# Patient Record
Sex: FEMALE | Race: WHITE | NOT HISPANIC OR LATINO | Employment: PART TIME | URBAN - METROPOLITAN AREA
[De-identification: names, ages, dates, MRNs, and addresses within clinical notes are randomized per-mention and may not be internally consistent; named-entity substitution may affect disease eponyms.]

---

## 2019-07-29 ENCOUNTER — RECORDS - HEALTHEAST (OUTPATIENT)
Dept: LAB | Facility: CLINIC | Age: 49
End: 2019-07-29

## 2019-07-29 LAB
ANION GAP SERPL CALCULATED.3IONS-SCNC: 6 MMOL/L (ref 5–18)
BUN SERPL-MCNC: 10 MG/DL (ref 8–22)
CALCIUM SERPL-MCNC: 9.4 MG/DL (ref 8.5–10.5)
CHLORIDE BLD-SCNC: 106 MMOL/L (ref 98–107)
CHOLEST SERPL-MCNC: 246 MG/DL
CO2 SERPL-SCNC: 25 MMOL/L (ref 22–31)
CREAT SERPL-MCNC: 0.71 MG/DL (ref 0.6–1.1)
FASTING STATUS PATIENT QL REPORTED: NO
GFR SERPL CREATININE-BSD FRML MDRD: >60 ML/MIN/1.73M2
GLUCOSE BLD-MCNC: 81 MG/DL (ref 70–125)
HDLC SERPL-MCNC: 42 MG/DL
LDLC SERPL CALC-MCNC: 170 MG/DL
POTASSIUM BLD-SCNC: 3.9 MMOL/L (ref 3.5–5)
SODIUM SERPL-SCNC: 137 MMOL/L (ref 136–145)
TRIGL SERPL-MCNC: 169 MG/DL
TSH SERPL DL<=0.005 MIU/L-ACNC: 0.77 UIU/ML (ref 0.3–5)

## 2019-08-02 ENCOUNTER — RECORDS - HEALTHEAST (OUTPATIENT)
Dept: LAB | Facility: CLINIC | Age: 49
End: 2019-08-02

## 2019-08-02 LAB
FERRITIN SERPL-MCNC: 5 NG/ML (ref 10–130)
IRON SATN MFR SERPL: 10 % (ref 20–50)
IRON SERPL-MCNC: 44 UG/DL (ref 42–175)
TIBC SERPL-MCNC: 444 UG/DL (ref 313–563)
TRANSFERRIN SERPL-MCNC: 355 MG/DL (ref 212–360)

## 2020-08-25 ENCOUNTER — RECORDS - HEALTHEAST (OUTPATIENT)
Dept: LAB | Facility: CLINIC | Age: 50
End: 2020-08-25

## 2020-08-25 LAB
CHOLEST SERPL-MCNC: 297 MG/DL
FASTING STATUS PATIENT QL REPORTED: ABNORMAL
FASTING STATUS PATIENT QL REPORTED: NORMAL
GLUCOSE BLD-MCNC: 72 MG/DL (ref 70–125)
HDLC SERPL-MCNC: 45 MG/DL
LDLC SERPL CALC-MCNC: 213 MG/DL
TRIGL SERPL-MCNC: 194 MG/DL

## 2020-08-26 LAB — FERRITIN SERPL-MCNC: 23 NG/ML (ref 10–130)

## 2021-05-26 ENCOUNTER — RECORDS - HEALTHEAST (OUTPATIENT)
Dept: ADMINISTRATIVE | Facility: CLINIC | Age: 51
End: 2021-05-26

## 2021-05-30 ENCOUNTER — RECORDS - HEALTHEAST (OUTPATIENT)
Dept: ADMINISTRATIVE | Facility: CLINIC | Age: 51
End: 2021-05-30

## 2021-06-01 ENCOUNTER — RECORDS - HEALTHEAST (OUTPATIENT)
Dept: ADMINISTRATIVE | Facility: CLINIC | Age: 51
End: 2021-06-01

## 2021-11-29 ENCOUNTER — TRANSFERRED RECORDS (OUTPATIENT)
Dept: HEALTH INFORMATION MANAGEMENT | Facility: CLINIC | Age: 51
End: 2021-11-29

## 2022-01-13 ENCOUNTER — LAB REQUISITION (OUTPATIENT)
Dept: LAB | Facility: CLINIC | Age: 52
End: 2022-01-13
Payer: COMMERCIAL

## 2022-01-13 DIAGNOSIS — Z03.818 ENCOUNTER FOR OBSERVATION FOR SUSPECTED EXPOSURE TO OTHER BIOLOGICAL AGENTS RULED OUT: ICD-10-CM

## 2022-01-13 PROCEDURE — U0005 INFEC AGEN DETEC AMPLI PROBE: HCPCS | Mod: ORL | Performed by: FAMILY MEDICINE

## 2022-01-14 LAB — SARS-COV-2 RNA RESP QL NAA+PROBE: NEGATIVE

## 2022-04-19 ENCOUNTER — TRANSFERRED RECORDS (OUTPATIENT)
Dept: HEALTH INFORMATION MANAGEMENT | Facility: CLINIC | Age: 52
End: 2022-04-19
Payer: COMMERCIAL

## 2022-04-20 ENCOUNTER — TRANSCRIBE ORDERS (OUTPATIENT)
Dept: RESPIRATORY THERAPY | Facility: CLINIC | Age: 52
End: 2022-04-20
Payer: COMMERCIAL

## 2022-04-20 DIAGNOSIS — J45.40 MODERATE PERSISTENT ASTHMA: Primary | ICD-10-CM

## 2022-04-21 ENCOUNTER — TRANSFERRED RECORDS (OUTPATIENT)
Dept: HEALTH INFORMATION MANAGEMENT | Facility: CLINIC | Age: 52
End: 2022-04-21
Payer: COMMERCIAL

## 2022-04-25 ENCOUNTER — LAB REQUISITION (OUTPATIENT)
Dept: LAB | Facility: CLINIC | Age: 52
End: 2022-04-25

## 2022-04-25 ENCOUNTER — MEDICAL CORRESPONDENCE (OUTPATIENT)
Dept: HEALTH INFORMATION MANAGEMENT | Facility: CLINIC | Age: 52
End: 2022-04-25
Payer: COMMERCIAL

## 2022-04-25 DIAGNOSIS — R91.8 OTHER NONSPECIFIC ABNORMAL FINDING OF LUNG FIELD: ICD-10-CM

## 2022-04-25 LAB
ALBUMIN SERPL-MCNC: 3.8 G/DL (ref 3.5–5)
ALP SERPL-CCNC: 83 U/L (ref 45–120)
ALT SERPL W P-5'-P-CCNC: 14 U/L (ref 0–45)
ANION GAP SERPL CALCULATED.3IONS-SCNC: 9 MMOL/L (ref 5–18)
AST SERPL W P-5'-P-CCNC: 15 U/L (ref 0–40)
BASOPHILS # BLD AUTO: 0.1 10E3/UL (ref 0–0.2)
BASOPHILS NFR BLD AUTO: 1 %
BILIRUB SERPL-MCNC: 0.4 MG/DL (ref 0–1)
BUN SERPL-MCNC: 14 MG/DL (ref 8–22)
C REACTIVE PROTEIN LHE: 0.5 MG/DL (ref 0–0.8)
CALCIUM SERPL-MCNC: 9.4 MG/DL (ref 8.5–10.5)
CHLORIDE BLD-SCNC: 105 MMOL/L (ref 98–107)
CO2 SERPL-SCNC: 26 MMOL/L (ref 22–31)
CREAT SERPL-MCNC: 0.79 MG/DL (ref 0.6–1.1)
EOSINOPHIL # BLD AUTO: 0.2 10E3/UL (ref 0–0.7)
EOSINOPHIL NFR BLD AUTO: 3 %
ERYTHROCYTE [DISTWIDTH] IN BLOOD BY AUTOMATED COUNT: 13.5 % (ref 10–15)
ERYTHROCYTE [SEDIMENTATION RATE] IN BLOOD BY WESTERGREN METHOD: 20 MM/HR (ref 0–20)
GFR SERPL CREATININE-BSD FRML MDRD: >90 ML/MIN/1.73M2
GLUCOSE BLD-MCNC: 93 MG/DL (ref 70–125)
HCT VFR BLD AUTO: 40.9 % (ref 35–47)
HGB BLD-MCNC: 13.2 G/DL (ref 11.7–15.7)
IMM GRANULOCYTES # BLD: 0 10E3/UL
IMM GRANULOCYTES NFR BLD: 0 %
LYMPHOCYTES # BLD AUTO: 1.7 10E3/UL (ref 0.8–5.3)
LYMPHOCYTES NFR BLD AUTO: 23 %
MCH RBC QN AUTO: 29.6 PG (ref 26.5–33)
MCHC RBC AUTO-ENTMCNC: 32.3 G/DL (ref 31.5–36.5)
MCV RBC AUTO: 92 FL (ref 78–100)
MONOCYTES # BLD AUTO: 0.5 10E3/UL (ref 0–1.3)
MONOCYTES NFR BLD AUTO: 7 %
NEUTROPHILS # BLD AUTO: 4.9 10E3/UL (ref 1.6–8.3)
NEUTROPHILS NFR BLD AUTO: 66 %
NRBC # BLD AUTO: 0 10E3/UL
NRBC BLD AUTO-RTO: 0 /100
PLATELET # BLD AUTO: 219 10E3/UL (ref 150–450)
POTASSIUM BLD-SCNC: 4.1 MMOL/L (ref 3.5–5)
PROT SERPL-MCNC: 7.1 G/DL (ref 6–8)
RBC # BLD AUTO: 4.46 10E6/UL (ref 3.8–5.2)
SODIUM SERPL-SCNC: 140 MMOL/L (ref 136–145)
WBC # BLD AUTO: 7.4 10E3/UL (ref 4–11)

## 2022-04-25 PROCEDURE — 86140 C-REACTIVE PROTEIN: CPT | Performed by: FAMILY MEDICINE

## 2022-04-25 PROCEDURE — 85025 COMPLETE CBC W/AUTO DIFF WBC: CPT | Performed by: FAMILY MEDICINE

## 2022-04-25 PROCEDURE — 85652 RBC SED RATE AUTOMATED: CPT | Performed by: FAMILY MEDICINE

## 2022-04-25 PROCEDURE — 80053 COMPREHEN METABOLIC PANEL: CPT | Performed by: FAMILY MEDICINE

## 2022-05-12 ENCOUNTER — HOSPITAL ENCOUNTER (OUTPATIENT)
Dept: RESPIRATORY THERAPY | Facility: CLINIC | Age: 52
Discharge: HOME | End: 2022-05-12
Admitting: FAMILY MEDICINE
Payer: COMMERCIAL

## 2022-05-12 DIAGNOSIS — J45.40 MODERATE PERSISTENT ASTHMA: ICD-10-CM

## 2022-05-12 PROCEDURE — 94726 PLETHYSMOGRAPHY LUNG VOLUMES: CPT

## 2022-05-12 PROCEDURE — 250N000009 HC RX 250

## 2022-05-12 PROCEDURE — 999N000157 HC STATISTIC RCP TIME EA 10 MIN

## 2022-05-12 PROCEDURE — 94060 EVALUATION OF WHEEZING: CPT | Mod: 26 | Performed by: INTERNAL MEDICINE

## 2022-05-12 PROCEDURE — 94729 DIFFUSING CAPACITY: CPT

## 2022-05-12 PROCEDURE — 94726 PLETHYSMOGRAPHY LUNG VOLUMES: CPT | Mod: 26 | Performed by: INTERNAL MEDICINE

## 2022-05-12 PROCEDURE — 94060 EVALUATION OF WHEEZING: CPT

## 2022-05-12 PROCEDURE — 94729 DIFFUSING CAPACITY: CPT | Mod: 26 | Performed by: INTERNAL MEDICINE

## 2022-05-12 RX ORDER — ALBUTEROL SULFATE 0.83 MG/ML
2.5 SOLUTION RESPIRATORY (INHALATION) ONCE
Status: COMPLETED | OUTPATIENT
Start: 2022-05-12 | End: 2022-05-12

## 2022-05-12 RX ADMIN — ALBUTEROL SULFATE 2.5 MG: 2.5 SOLUTION RESPIRATORY (INHALATION) at 07:23

## 2022-05-12 NOTE — PROGRESS NOTES
RESPIRATORY CARE NOTE     Patient Name: Lesley Napoles  Today's Date: 5/12/2022     Complete PFT done. Pt performed tests with good effort. Test results meet ATS criteria. Albuterol neb given.Results scanned into epic. Pt left in no distress.       Mena Branham, RT

## 2022-05-13 LAB
DLCOCOR-%PRED-PRE: 99 %
DLCOCOR-PRE: 23.03 ML/MIN/MMHG
DLCOUNC-%PRED-PRE: 98 %
DLCOUNC-PRE: 22.88 ML/MIN/MMHG
DLCOUNC-PRED: 23.25 ML/MIN/MMHG
ERV-%PRED-PRE: 85 %
ERV-PRE: 0.77 L
ERV-PRED: 0.91 L
EXPTIME-PRE: 6.8 SEC
FEF2575-%PRED-POST: 65 %
FEF2575-%PRED-PRE: 61 %
FEF2575-POST: 1.92 L/SEC
FEF2575-PRE: 1.78 L/SEC
FEF2575-PRED: 2.91 L/SEC
FEFMAX-%PRED-PRE: 100 %
FEFMAX-PRE: 7.26 L/SEC
FEFMAX-PRED: 7.21 L/SEC
FEV1-%PRED-PRE: 90 %
FEV1-PRE: 2.76 L
FEV1FEV6-PRE: 70 %
FEV1FEV6-PRED: 82 %
FEV1FVC-PRE: 69 %
FEV1FVC-PRED: 80 %
FEV1SVC-PRE: 69 %
FEV1SVC-PRED: 79 %
FIFMAX-PRE: 3.28 L/SEC
FRCPLETH-%PRED-PRE: 141 %
FRCPLETH-PRE: 4.05 L
FRCPLETH-PRED: 2.86 L
FVC-%PRED-PRE: 104 %
FVC-PRE: 3.98 L
FVC-PRED: 3.83 L
IC-%PRED-PRE: 109 %
IC-PRE: 3.22 L
IC-PRED: 2.96 L
RVPLETH-%PRED-PRE: 174 %
RVPLETH-PRE: 3.28 L
RVPLETH-PRED: 1.88 L
TLCPLETH-%PRED-PRE: 133 %
TLCPLETH-PRE: 7.28 L
TLCPLETH-PRED: 5.44 L
VA-%PRED-PRE: 114 %
VA-PRE: 6.34 L
VC-%PRED-PRE: 103 %
VC-PRE: 4 L
VC-PRED: 3.86 L

## 2022-05-22 ENCOUNTER — HEALTH MAINTENANCE LETTER (OUTPATIENT)
Age: 52
End: 2022-05-22

## 2022-06-13 PROBLEM — J30.1 ALLERGIC RHINITIS DUE TO POLLEN: Status: ACTIVE | Noted: 2022-06-13

## 2022-06-13 PROBLEM — N92.0 EXCESSIVE AND FREQUENT MENSTRUATION: Status: ACTIVE | Noted: 2022-06-13

## 2022-06-13 PROBLEM — K59.01 CONSTIPATION BY DELAYED COLONIC TRANSIT: Status: ACTIVE | Noted: 2022-06-13

## 2022-06-13 PROBLEM — J45.40 MODERATE PERSISTENT ASTHMA WITHOUT COMPLICATION: Status: ACTIVE | Noted: 2022-06-13

## 2022-06-13 PROBLEM — F41.1 GENERALIZED ANXIETY DISORDER: Status: ACTIVE | Noted: 2022-06-13

## 2022-06-13 PROBLEM — F33.42 RECURRENT MAJOR DEPRESSION IN FULL REMISSION (H): Status: ACTIVE | Noted: 2022-06-13

## 2022-06-13 NOTE — PROGRESS NOTES
Pulmonary Clinic Outpatient Consultation  6/22/2022     Assessment and Plan:   #. Abnormal CT chest with mediastinal lymphadenopathy, HARMAN GGO, and several nodules.  #. Subacute on chronic cough.  #. Asthma, moderate persistent. Worsened control following her illness in December & step down in asthma therapy.   #. Allergies. Poorly controlled.       Step up in asthma therapy -- high dose Advair BID + Spiriva Respimat daily   --> if symptoms are improved, then would continue on triple therapy (Trelegy not covered by her insurance) for 6 months, then start stepping down   --> if no change in symptoms, check CBC/diff, IgE total, [place] allergen panel & refer to Allergy Clinic    Step up in PND therapy -- Flonase 2 sprays/nostril BID + Atrovent 2 sprays/nostril TID    Continue allergy therapy -- Allegra + montelukast; discussed not having her dog sleep in her bed or even in her bedroom    Non-contrast CT chest in 4-6 weeks (3 months from the original study)   --> if the radiographic changes are improved, no further follow up   --> if radiographic changes are stable despite improved pulmonary symptoms, plan for PET/CT, then possibly EBUS    Action Plan -- prednisone 40 mg daily x 7 days with a 5-day Z-pack. May repeat x1, but if fails 2 action plan activations back-to-back, will need to go the the ED.    RTC 2 months.    Jo Melendez MD  Pulmonary and Critical Care   ______________________________________________________________________________    CC: cough, abnormal CT chest    HPI:   Lesley Napoles is a 52 year old never smoker with history of moderate persistent asthma and allergies, presenting today for evaluation of abnormal CT chest in April 2022.  Most recent PCP notes from mid April indicated cough for 4 months with several courses of prednisone and a trial of GERD treatment.  She was on Dulera at one point and then was stepped down to Pulmicort in 02/2022.    She has a normal PFT.  Her CT chest from April 2022 is  notable for GGO in HARMAN (36 x 32 x 31 mm), as well as some nodular densities and nodules throughout her lungs bilaterally, mucous plugging, and several enlarged mediastinal and hilar lymph nodes.    Reports being on Pulmicort for a long time, then was put on Dulera a couple of years ago, then decided to go back to Pulmocort about 1.5 month ago to see if that would help (her cough stayed the same). She had to be on 4 puffs BID to mimic the effects of Dulera, & is now down to 3 puffs BID.     Her cough is intermittently wet & dry cough that happens daily since December 2021; she had some sort of a respiratory illness (thought she had a pneumonia, but was told that her CXR was clear) at that time, & her cough started & stayed the same since that time. 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 She had to have oral steroids back in December; and then 3 courses of steroids in April (taper of some sort) when she switched over to Pulmocort. She had daytime & nighttime cough -- usually when she first lays down, but also sometimes at night. Her coughed up phlegm looks yellow and chunky; she does not necessarily feel better after she coughs up phlegm.     She has quite a bit of allergies -- uses Flonase, daily Allergra (works better than Claritin). She also takes montelukast every night; she restarted it sometime w/in the last year, but definitely before December.     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     One of her nieces has allergies. She is a never smoker w/ secondhand smoke exposure. Her grandmother had breast cancer in her 70s and family was reassured that there was no big genetic component.     ROS: 10 point ROS reviewed and noted to be negative w/ exceptions as detailed in the HPI.    Physical Exam:  /64 (BP Location: Left arm, Patient Position: Chair, Cuff Size: Adult Regular)   Pulse 88   Wt 83.4 kg  (183 lb 12.8 oz)   SpO2 96%   Gen: alert, oriented, no distress  HEENT: masked, PERRL; no stridor; a lot of rhinorrhea  CV: RRR, no M/G/R  Resp: equal bilateral air entry, breath sounds clear throughout, no focal crackles or wheezes; able to converse in full sentences w/ no respiratory distress  Abd: soft, nontender, no palpable organomegaly  Skin: no apparent rashes  Ext: no cyanosis, no clubbing, no BLE edema  Neuro: alert, nonfocal    Labs: personally reviewed in the EMR.  Imaging studies: personally reviewed and interpreted. Below are the Radiology interpretations.  Contrast-enhanced CT chest, 4/21/2022: Outside imaging study and outside report.          PFT's (5/12/2022): Normal spirometry without clinically significant bronchodilator response, presence of hyperinflation but no air-trapping, normal diffusion capacity.        PMH:  Patient Active Problem List    Diagnosis Date Noted     Allergic rhinitis due to pollen 06/13/2022     Priority: Medium     Constipation by delayed colonic transit 06/13/2022     Priority: Medium     Excessive and frequent menstruation 06/13/2022     Priority: Medium     Generalized anxiety disorder 06/13/2022     Priority: Medium     Moderate persistent asthma without complication 06/13/2022     Priority: Medium     Recurrent major depression in full remission (H) 06/13/2022     Priority: Medium       PSH: Lipoma removal 2002, sinus surgery 1990, sinus surgery 2000, D&C for postpartum bleeding in 2009    Family HX: No known issues in her immediate family members.    Social Hx:   Never smoker with significant secondhand smoke exposure    Current Meds:  No current outpatient medications on file.       Allergies:  Allergies   Allergen Reactions     Articaine Swelling

## 2022-06-22 ENCOUNTER — OFFICE VISIT (OUTPATIENT)
Dept: PULMONOLOGY | Facility: OTHER | Age: 52
End: 2022-06-22
Payer: COMMERCIAL

## 2022-06-22 VITALS
WEIGHT: 183.8 LBS | OXYGEN SATURATION: 96 % | HEART RATE: 88 BPM | SYSTOLIC BLOOD PRESSURE: 118 MMHG | DIASTOLIC BLOOD PRESSURE: 64 MMHG

## 2022-06-22 DIAGNOSIS — R59.1 LYMPHADENOPATHY: ICD-10-CM

## 2022-06-22 DIAGNOSIS — R91.8 LUNG NODULES: ICD-10-CM

## 2022-06-22 DIAGNOSIS — R09.82 PND (POST-NASAL DRIP): ICD-10-CM

## 2022-06-22 DIAGNOSIS — J45.40 MODERATE PERSISTENT ASTHMA WITHOUT COMPLICATION: ICD-10-CM

## 2022-06-22 DIAGNOSIS — R05.9 COUGH: Primary | ICD-10-CM

## 2022-06-22 PROCEDURE — 99204 OFFICE O/P NEW MOD 45 MIN: CPT | Performed by: INTERNAL MEDICINE

## 2022-06-22 RX ORDER — BUDESONIDE 180 UG/1
2 AEROSOL, POWDER RESPIRATORY (INHALATION) 2 TIMES DAILY
COMMUNITY
End: 2023-11-21 | Stop reason: ALTCHOICE

## 2022-06-22 RX ORDER — TIOTROPIUM BROMIDE INHALATION SPRAY 3.12 UG/1
2 SPRAY, METERED RESPIRATORY (INHALATION) DAILY
Qty: 4 G | Refills: 1 | Status: SHIPPED | OUTPATIENT
Start: 2022-06-22 | End: 2022-08-22

## 2022-06-22 RX ORDER — CITALOPRAM HYDROBROMIDE 40 MG/1
40 TABLET ORAL DAILY
COMMUNITY

## 2022-06-22 RX ORDER — FEXOFENADINE HCL 180 MG/1
180 TABLET ORAL DAILY
COMMUNITY
End: 2024-08-29

## 2022-06-22 RX ORDER — FLUTICASONE PROPIONATE 50 MCG
1 SPRAY, SUSPENSION (ML) NASAL DAILY
COMMUNITY
End: 2022-06-22

## 2022-06-22 RX ORDER — FLUTICASONE PROPIONATE 50 MCG
2 SPRAY, SUSPENSION (ML) NASAL 2 TIMES DAILY
Qty: 16 G | Refills: 1 | Status: SHIPPED | OUTPATIENT
Start: 2022-06-22 | End: 2022-09-07

## 2022-06-22 RX ORDER — FLUTICASONE PROPIONATE AND SALMETEROL XINAFOATE 230; 21 UG/1; UG/1
2 AEROSOL, METERED RESPIRATORY (INHALATION) 2 TIMES DAILY
Qty: 12 G | Refills: 1 | Status: SHIPPED | OUTPATIENT
Start: 2022-06-22 | End: 2022-08-31

## 2022-06-22 RX ORDER — IPRATROPIUM BROMIDE 21 UG/1
2 SPRAY, METERED NASAL 3 TIMES DAILY
Qty: 30 ML | Refills: 1 | Status: SHIPPED | OUTPATIENT
Start: 2022-06-22 | End: 2022-09-29

## 2022-06-22 NOTE — PATIENT INSTRUCTIONS
Plan:  We will step up your asthma therapy to ADVAIR 2 puff every morning and every evening, every day. Remember to rinse and gargle you mouth & throat after using the inhaler.   AND I will start you on an additional inhaler SPIRIVA RESPIMAT to use 2 puffs once a day, every day.   I want you to use your Flonase 2 sprays in each nostril twice a day. You can back off to 1 spray in each nostril twice a day if you get some nasal bleeding.  I am adding an additional nasal spray for all that congestion -- ATROVENT 2 sprays in each nostril 3 times/day.  We will get a CT scan of your lungs in 4-6 weeks to follow up on that nodule and the enlarged lymph nodes.    You should follow up in 2 months.     If you have any questions or concerns, please, call our clinic at [phone].

## 2022-08-15 ENCOUNTER — HOSPITAL ENCOUNTER (OUTPATIENT)
Dept: CT IMAGING | Facility: HOSPITAL | Age: 52
Discharge: HOME | End: 2022-08-15
Attending: INTERNAL MEDICINE | Admitting: INTERNAL MEDICINE
Payer: COMMERCIAL

## 2022-08-15 DIAGNOSIS — E04.1 THYROID NODULE: ICD-10-CM

## 2022-08-15 DIAGNOSIS — R91.8 LUNG NODULES: ICD-10-CM

## 2022-08-15 DIAGNOSIS — R91.8 LUNG INFILTRATE: Primary | ICD-10-CM

## 2022-08-15 DIAGNOSIS — R59.1 LYMPHADENOPATHY: ICD-10-CM

## 2022-08-15 PROCEDURE — 71250 CT THORAX DX C-: CPT

## 2022-08-15 NOTE — RESULT ENCOUNTER NOTE
Received results as I am covering Dr. Melendez's inbasket, CT reviewed. Waxing and waning parenchymal findings, question of aspiration, and possible thyroid abnormality. Seeing Dr. Waddell in our clinic on 8/22. Will order SLP eval with VFSS. Will orther thyroid U/S. Will send patient message re above.       Betty Mario MD  Pulmonary and Critical Care Medicine  [facility]  Office: [phone]

## 2022-08-16 ENCOUNTER — TELEPHONE (OUTPATIENT)
Dept: PULMONOLOGY | Facility: OTHER | Age: 52
End: 2022-08-16

## 2022-08-16 ENCOUNTER — HOSPITAL ENCOUNTER (OUTPATIENT)
Dept: ULTRASOUND IMAGING | Facility: HOSPITAL | Age: 52
Discharge: HOME | End: 2022-08-16
Attending: INTERNAL MEDICINE | Admitting: INTERNAL MEDICINE
Payer: COMMERCIAL

## 2022-08-16 DIAGNOSIS — E04.1 THYROID NODULE: ICD-10-CM

## 2022-08-16 LAB — RADIOLOGIST FLAGS: ABNORMAL

## 2022-08-16 PROCEDURE — 76536 US EXAM OF HEAD AND NECK: CPT

## 2022-08-16 NOTE — TELEPHONE ENCOUNTER
Lesley called to request a phone call back in regards to her CT and thyroid test that were just completed. Will send request to call to Dr. Melendez and Dr. Mario who is covering for Dr. Melendez.

## 2022-08-17 ENCOUNTER — TELEPHONE (OUTPATIENT)
Dept: PULMONOLOGY | Facility: OTHER | Age: 52
End: 2022-08-17

## 2022-08-17 ENCOUNTER — MYC MEDICAL ADVICE (OUTPATIENT)
Dept: PULMONOLOGY | Facility: OTHER | Age: 52
End: 2022-08-17

## 2022-08-17 ENCOUNTER — LAB REQUISITION (OUTPATIENT)
Dept: LAB | Facility: CLINIC | Age: 52
End: 2022-08-17

## 2022-08-17 DIAGNOSIS — Z01.419 ENCOUNTER FOR GYNECOLOGICAL EXAMINATION (GENERAL) (ROUTINE) WITHOUT ABNORMAL FINDINGS: ICD-10-CM

## 2022-08-17 DIAGNOSIS — E78.2 MIXED HYPERLIPIDEMIA: ICD-10-CM

## 2022-08-17 DIAGNOSIS — R91.8 LUNG INFILTRATE: Primary | ICD-10-CM

## 2022-08-17 LAB
AST SERPL W P-5'-P-CCNC: 22 U/L (ref 10–35)
CHOLEST SERPL-MCNC: 256 MG/DL
HDLC SERPL-MCNC: 49 MG/DL
LDLC SERPL CALC-MCNC: 175 MG/DL
NONHDLC SERPL-MCNC: 207 MG/DL
TRIGL SERPL-MCNC: 160 MG/DL

## 2022-08-17 PROCEDURE — G0145 SCR C/V CYTO,THINLAYER,RESCR: HCPCS | Performed by: FAMILY MEDICINE

## 2022-08-17 PROCEDURE — 84450 TRANSFERASE (AST) (SGOT): CPT | Performed by: FAMILY MEDICINE

## 2022-08-17 PROCEDURE — 87624 HPV HI-RISK TYP POOLED RSLT: CPT | Performed by: FAMILY MEDICINE

## 2022-08-17 PROCEDURE — 80061 LIPID PANEL: CPT | Performed by: FAMILY MEDICINE

## 2022-08-17 NOTE — TELEPHONE ENCOUNTER
Spoke with Lesley about her CT results from 8/15/22. She expressed interest in having a video swallow study done to determine if aspiration is playing a role in her lung issues. I will place this order today and she will follow up with Dr. Waddell in clinic on 8/22/22.   She had the thyroid US done today. She is OK waiting to go over these results and a further plan with him at follow up.     She has no further questions or concerns.

## 2022-08-17 NOTE — TELEPHONE ENCOUNTER
Imaging called in regards to a flagged result of a new right thyroid nodule on impression image #1 . Will send urgent message and text to Dr. Mario.

## 2022-08-21 LAB
BKR LAB AP GYN ADEQUACY: NORMAL
BKR LAB AP GYN INTERPRETATION: NORMAL
BKR LAB AP HPV REFLEX: NORMAL
BKR LAB AP LMP: NORMAL
BKR LAB AP PREVIOUS ABNORMAL: NORMAL
PATH REPORT.COMMENTS IMP SPEC: NORMAL
PATH REPORT.COMMENTS IMP SPEC: NORMAL
PATH REPORT.RELEVANT HX SPEC: NORMAL

## 2022-08-22 ENCOUNTER — LAB (OUTPATIENT)
Dept: LAB | Facility: HOSPITAL | Age: 52
End: 2022-08-22
Payer: COMMERCIAL

## 2022-08-22 ENCOUNTER — OFFICE VISIT (OUTPATIENT)
Dept: PULMONOLOGY | Facility: OTHER | Age: 52
End: 2022-08-22

## 2022-08-22 VITALS
HEART RATE: 91 BPM | SYSTOLIC BLOOD PRESSURE: 115 MMHG | WEIGHT: 180.6 LBS | DIASTOLIC BLOOD PRESSURE: 79 MMHG | OXYGEN SATURATION: 94 %

## 2022-08-22 DIAGNOSIS — R05.3 CHRONIC COUGH: Primary | ICD-10-CM

## 2022-08-22 DIAGNOSIS — J45.40 MODERATE PERSISTENT EXTRINSIC ASTHMA WITHOUT COMPLICATION: ICD-10-CM

## 2022-08-22 DIAGNOSIS — R05.3 CHRONIC COUGH: ICD-10-CM

## 2022-08-22 LAB
BASOPHILS # BLD AUTO: 0.1 10E3/UL (ref 0–0.2)
BASOPHILS NFR BLD AUTO: 1 %
EOSINOPHIL # BLD AUTO: 0.3 10E3/UL (ref 0–0.7)
EOSINOPHIL NFR BLD AUTO: 3 %
ERYTHROCYTE [DISTWIDTH] IN BLOOD BY AUTOMATED COUNT: 13.2 % (ref 10–15)
HCT VFR BLD AUTO: 39.8 % (ref 35–47)
HGB BLD-MCNC: 13.2 G/DL (ref 11.7–15.7)
IMM GRANULOCYTES # BLD: 0 10E3/UL
IMM GRANULOCYTES NFR BLD: 0 %
LYMPHOCYTES # BLD AUTO: 1.8 10E3/UL (ref 0.8–5.3)
LYMPHOCYTES NFR BLD AUTO: 17 %
MCH RBC QN AUTO: 29.4 PG (ref 26.5–33)
MCHC RBC AUTO-ENTMCNC: 33.2 G/DL (ref 31.5–36.5)
MCV RBC AUTO: 89 FL (ref 78–100)
MONOCYTES # BLD AUTO: 0.8 10E3/UL (ref 0–1.3)
MONOCYTES NFR BLD AUTO: 7 %
NEUTROPHILS # BLD AUTO: 8 10E3/UL (ref 1.6–8.3)
NEUTROPHILS NFR BLD AUTO: 72 %
NRBC # BLD AUTO: 0 10E3/UL
NRBC BLD AUTO-RTO: 0 /100
PLATELET # BLD AUTO: 267 10E3/UL (ref 150–450)
RBC # BLD AUTO: 4.49 10E6/UL (ref 3.8–5.2)
WBC # BLD AUTO: 11 10E3/UL (ref 4–11)

## 2022-08-22 PROCEDURE — 86003 ALLG SPEC IGE CRUDE XTRC EA: CPT

## 2022-08-22 PROCEDURE — 36415 COLL VENOUS BLD VENIPUNCTURE: CPT

## 2022-08-22 PROCEDURE — 99214 OFFICE O/P EST MOD 30 MIN: CPT | Performed by: INTERNAL MEDICINE

## 2022-08-22 PROCEDURE — 82785 ASSAY OF IGE: CPT

## 2022-08-22 PROCEDURE — 85018 HEMOGLOBIN: CPT

## 2022-08-22 RX ORDER — MONTELUKAST SODIUM 10 MG/1
10 TABLET ORAL AT BEDTIME
Qty: 30 TABLET | Refills: 0 | COMMUNITY
Start: 2022-08-22 | End: 2024-08-29

## 2022-08-22 ASSESSMENT — ASTHMA QUESTIONNAIRES
QUESTION_4 LAST FOUR WEEKS HOW OFTEN HAVE YOU USED YOUR RESCUE INHALER OR NEBULIZER MEDICATION (SUCH AS ALBUTEROL): TWO OR THREE TIMES PER WEEK
QUESTION_1 LAST FOUR WEEKS HOW MUCH OF THE TIME DID YOUR ASTHMA KEEP YOU FROM GETTING AS MUCH DONE AT WORK, SCHOOL OR AT HOME: NONE OF THE TIME
QUESTION_5 LAST FOUR WEEKS HOW WOULD YOU RATE YOUR ASTHMA CONTROL: SOMEWHAT CONTROLLED
QUESTION_2 LAST FOUR WEEKS HOW OFTEN HAVE YOU HAD SHORTNESS OF BREATH: THREE TO SIX TIMES A WEEK
QUESTION_3 LAST FOUR WEEKS HOW OFTEN DID YOUR ASTHMA SYMPTOMS (WHEEZING, COUGHING, SHORTNESS OF BREATH, CHEST TIGHTNESS OR PAIN) WAKE YOU UP AT NIGHT OR EARLIER THAN USUAL IN THE MORNING: TWO OR THREE NIGHTS A WEEK
ACT_TOTALSCORE: 16
ACT_TOTALSCORE: 16

## 2022-08-22 NOTE — PATIENT INSTRUCTIONS
1) Lets give the spiriva a rest  2) I recommend you DO take the pulmicort on top of the advair.  Take one puff twice a day after the advair, use your spacer to help get more of this in your lung  3) I have asked for some blood work to help work through the allergies  4) I have tried to reorder the swallow study  5) I will have you come back in 3 months to check back in  6) I have asked allergy to see you to consider allergy therapy  7) Dr. Ardon should consider ordering a fine needle aspiration of the thyroid nodule given it meets size criteria on the ultrasound

## 2022-08-22 NOTE — LETTER
8/22/2022       RE: Lesley Napoles  [address]  [place] [address]     Dear Colleague,    Thank you for referring your patient, Lesley Napoles, to the [facility]. Please see a copy of my visit note below.    Assessment and Plan:Lesley Napoles is a 52 year old female with a past medical history significant for asthma who presents to clinic today for follow up.  Her Chest CT follow up shows some improvement of GGO, but then some new.  There is suggestion of aspiration, but also this could simply be a chronic bronchitis phenotype from uncontrolled asthma.  She is not responding to spiriva, but has a clear history of steroid responsiveness so will reattempt to double down on her inhaled steroid.  Given the refractory nature of her asthma, I do think visiting an allergist would be useful.    1) Moderate persistent asthma - continue advair, add spacer.  Retry pulmicort in addition to advair.  Stop spiriva.  Continue singulair, allegra, flonase.  2) Workup of asthma - send blood for CBC with eos, IgE,[place] resp allergen panel, and refer to sleep  3) Question of aspiration on CT  - refer to SLP for video swallow study  4) RTC in 3 months           CCx: asthma    HPI: Ms. Napoles is a 52 year old female with asthma who returns for follow up.  Her symptoms this summer have been refractory and she is not responding to multiple therapies other than prednisone.  Her cough remains all day long and is productive about half the time.  She does hear wheezing, and is dyspneic.  She uses her albuterol inhaler multiple times a day.  She is not finding addition of spiriva was that helpful, and her pharmacist told her not to to take the pulmicort we tried last time.      ROS:  Review of Systems - History obtained from the patient  General ROS: negative  Psychological ROS: negative  ENT ROS: negative  Allergy and Immunology ROS: negative  Endocrine  ROS: negative  Respiratory ROS: positive for - cough, shortness of breath, sputum changes and wheezing  negative for - stridor or tachypnea  Cardiovascular ROS: no chest pain or palpitations  Gastrointestinal ROS: no abdominal pain, change in bowel habits, or black or bloody stools  Genito-Urinary ROS: no dysuria, trouble voiding, or hematuria  Musculoskeletal ROS: negative  Neurological ROS: no TIA or stroke symptoms  Dermatological ROS: negative      Current Meds:  Current Outpatient Medications   Medication Sig Dispense Refill     citalopram (CELEXA) 40 MG tablet Take 40 mg by mouth daily       fexofenadine (ALLEGRA) 180 MG tablet Take 180 mg by mouth daily       fluticasone (FLONASE) 50 MCG/ACT nasal spray Spray 2 sprays into both nostrils 2 times daily 16 g 1     fluticasone-salmeterol (ADVAIR-HFA) 230-21 MCG/ACT inhaler Inhale 2 puffs into the lungs 2 times daily 12 g 1     ipratropium (ATROVENT) 0.03 % nasal spray Spray 2 sprays into both nostrils 3 times daily for 90 days 30 mL 1     montelukast (SINGULAIR) 10 MG tablet Take 1 tablet (10 mg) by mouth At Bedtime 30 tablet 0     Multiple Vitamin (MULTIVITAMIN ADULT PO) Take 1 tablet by mouth daily       budesonide (PULMICORT FLEXHALER) 180 MCG/ACT inhaler Inhale 1 puff into the lungs 2 times daily         Labs:  @clab@  Lab Results   Component Value Date    WBC 11.0 08/22/2022    HGB 13.2 08/22/2022    HCT 39.8 08/22/2022     08/22/2022     04/25/2022    POTASSIUM 4.1 04/25/2022    CHLORIDE 105 04/25/2022    CO2 26 04/25/2022    GLC 93 04/25/2022    BUN 14 04/25/2022    CR 0.79 04/25/2022    BILITOTAL 0.4 04/25/2022    AST 22 08/17/2022    ALT 14 04/25/2022    ALKPHOS 83 04/25/2022    PROTTOTAL 7.1 04/25/2022    ALBUMIN 3.8 04/25/2022       I have personally reviewed all pertinent imaging studies and PFT results unless otherwise noted.    Imaging studies:  US Thyroid    Result Date: 8/16/2022  EXAM: US THYROID LOCATION: [facility]  HOSPITAL DATE/TIME: 8/16/2022 2:22 PM INDICATION: Right thyroid nodule on chest CT COMPARISON: Chest CT 08/15/2022 TECHNIQUE: Thyroid ultrasound. FINDINGS: RIGHT lobe: 3.2 x 1.9 x 2.2 cm. Dominant nodule in the midportion described below. Isthmus: 2 mm. LEFT lobe: 4.6 x 1.5 x 1.7 cm. There are a few scattered less than 1 cm nodules that do not meet criteria for follow-up or FNA. NECK: No cervical lymphadenopathy. NODULES: Nodule 1: Right midabdomen thyroid nodule measuring 3.2 x 1.9 x 2.2 cm Composition: Mixed cystic and solid, 1 point Echogenicity: Hypoechoic, 2 points Shape: Wider-than-tall, 0 points Margin: Smooth, 0 points Echogenic Foci: None, or large comet-tail artifacts, 0 points Point Total: 3 points. TI-RADS 3. If 2.5 cm or larger, recommend FNA; if 1.5 cm or larger, recommend follow up US at 1, 3, and 5 years.       IMPRESSION: 1.  There is a Ti RADS 3 nodule in the right lobe that meet size criteria for FNA. Nodules are characterized per ACR Thyroid Imaging, Reporting and Data System (TI-RADS): White Paper of the ACR TI-RADS Committee Elie Quintero. et al. Journal of the American College of Radiology 2017. Volume 14 (2017), Issue 5, 587-506. [Access Center: Right thyroid nodule FNA needed.] This report will be copied to the [facility] to ensure a provider acknowledges the finding. Access Center is available Monday through Friday 8am-3:30 pm.         Physical Exam:  /79 (BP Location: Right arm)   Pulse 91   Wt 81.9 kg (180 lb 9.6 oz)   SpO2 94%   General - Well nourished  Ears/Mouth -  Deferred given mask use during pandemic  Neck - no cervical lymphadenopathy  Lungs - Clear to ausculation bilaterally   CVS - regular rhythm with no murmurs, rubs or gallups  Abdomen - soft, NT, ND, NABS  Ext - no cyanosis, clubbing or edema  Skin - no rash  Psychology - alert and oriented, answers appropriate        Electronically signed by:    Alfonzo Waddell MD PhD  [facility] Pulmonary  and Critical Care Medicine      Again, thank you for allowing me to participate in the care of your patient.      Sincerely,    Alfonzo Waddell MD

## 2022-08-23 LAB
HUMAN PAPILLOMA VIRUS 16 DNA: NEGATIVE
HUMAN PAPILLOMA VIRUS 18 DNA: NEGATIVE
HUMAN PAPILLOMA VIRUS FINAL DIAGNOSIS: NORMAL
HUMAN PAPILLOMA VIRUS OTHER HR: NEGATIVE
IGE SERPL-ACNC: 560 KU/L (ref 0–114)

## 2022-08-23 NOTE — PROGRESS NOTES
Assessment and Plan:Lesley Napoles is a 52 year old female with a past medical history significant for asthma who presents to clinic today for follow up.  Her Chest CT follow up shows some improvement of GGO, but then some new.  There is suggestion of aspiration, but also this could simply be a chronic bronchitis phenotype from uncontrolled asthma.  She is not responding to spiriva, but has a clear history of steroid responsiveness so will reattempt to double down on her inhaled steroid.  Given the refractory nature of her asthma, I do think visiting an allergist would be useful.    1) Moderate persistent asthma - continue advair, add spacer.  Retry pulmicort in addition to advair.  Stop spiriva.  Continue singulair, allegra, flonase.  2) Workup of asthma - send blood for CBC with eos, IgE,[place] resp allergen panel, and refer to sleep  3) Question of aspiration on CT  - refer to SLP for video swallow study  4) RTC in 3 months           CCx: asthma    HPI: Ms. Napoles is a 52 year old female with asthma who returns for follow up.  Her symptoms this summer have been refractory and she is not responding to multiple therapies other than prednisone.  Her cough remains all day long and is productive about half the time.  She does hear wheezing, and is dyspneic.  She uses her albuterol inhaler multiple times a day.  She is not finding addition of spiriva was that helpful, and her pharmacist told her not to to take the pulmicort we tried last time.      ROS:  Review of Systems - History obtained from the patient  General ROS: negative  Psychological ROS: negative  ENT ROS: negative  Allergy and Immunology ROS: negative  Endocrine ROS: negative  Respiratory ROS: positive for - cough, shortness of breath, sputum changes and wheezing  negative for - stridor or tachypnea  Cardiovascular ROS: no chest pain or palpitations  Gastrointestinal ROS: no abdominal pain, change in bowel habits, or black or bloody stools  Genito-Urinary  ROS: no dysuria, trouble voiding, or hematuria  Musculoskeletal ROS: negative  Neurological ROS: no TIA or stroke symptoms  Dermatological ROS: negative      Current Meds:  Current Outpatient Medications   Medication Sig Dispense Refill     citalopram (CELEXA) 40 MG tablet Take 40 mg by mouth daily       fexofenadine (ALLEGRA) 180 MG tablet Take 180 mg by mouth daily       fluticasone (FLONASE) 50 MCG/ACT nasal spray Spray 2 sprays into both nostrils 2 times daily 16 g 1     fluticasone-salmeterol (ADVAIR-HFA) 230-21 MCG/ACT inhaler Inhale 2 puffs into the lungs 2 times daily 12 g 1     ipratropium (ATROVENT) 0.03 % nasal spray Spray 2 sprays into both nostrils 3 times daily for 90 days 30 mL 1     montelukast (SINGULAIR) 10 MG tablet Take 1 tablet (10 mg) by mouth At Bedtime 30 tablet 0     Multiple Vitamin (MULTIVITAMIN ADULT PO) Take 1 tablet by mouth daily       budesonide (PULMICORT FLEXHALER) 180 MCG/ACT inhaler Inhale 1 puff into the lungs 2 times daily         Labs:  @clab@  Lab Results   Component Value Date    WBC 11.0 08/22/2022    HGB 13.2 08/22/2022    HCT 39.8 08/22/2022     08/22/2022     04/25/2022    POTASSIUM 4.1 04/25/2022    CHLORIDE 105 04/25/2022    CO2 26 04/25/2022    GLC 93 04/25/2022    BUN 14 04/25/2022    CR 0.79 04/25/2022    BILITOTAL 0.4 04/25/2022    AST 22 08/17/2022    ALT 14 04/25/2022    ALKPHOS 83 04/25/2022    PROTTOTAL 7.1 04/25/2022    ALBUMIN 3.8 04/25/2022       I have personally reviewed all pertinent imaging studies and PFT results unless otherwise noted.    Imaging studies:  US Thyroid    Result Date: 8/16/2022  EXAM: US THYROID LOCATION: [facility] DATE/TIME: 8/16/2022 2:22 PM INDICATION: Right thyroid nodule on chest CT COMPARISON: Chest CT 08/15/2022 TECHNIQUE: Thyroid ultrasound. FINDINGS: RIGHT lobe: 3.2 x 1.9 x 2.2 cm. Dominant nodule in the midportion described below. Isthmus: 2 mm. LEFT lobe: 4.6 x 1.5 x 1.7 cm. There are a  few scattered less than 1 cm nodules that do not meet criteria for follow-up or FNA. NECK: No cervical lymphadenopathy. NODULES: Nodule 1: Right midabdomen thyroid nodule measuring 3.2 x 1.9 x 2.2 cm Composition: Mixed cystic and solid, 1 point Echogenicity: Hypoechoic, 2 points Shape: Wider-than-tall, 0 points Margin: Smooth, 0 points Echogenic Foci: None, or large comet-tail artifacts, 0 points Point Total: 3 points. TI-RADS 3. If 2.5 cm or larger, recommend FNA; if 1.5 cm or larger, recommend follow up US at 1, 3, and 5 years.       IMPRESSION: 1.  There is a Ti RADS 3 nodule in the right lobe that meet size criteria for FNA. Nodules are characterized per ACR Thyroid Imaging, Reporting and Data System (TI-RADS): White Paper of the ACR TI-RADS Committee Elie Quintero et al. Journal of the American College of Radiology 2017. Volume 14 (2017), Issue 5, 517-843. [Access Center: Right thyroid nodule FNA needed.] This report will be copied to the [facility] to ensure a provider acknowledges the finding. Access Center is available Monday through Friday 8am-3:30 pm.         Physical Exam:  /79 (BP Location: Right arm)   Pulse 91   Wt 81.9 kg (180 lb 9.6 oz)   SpO2 94%   General - Well nourished  Ears/Mouth -  Deferred given mask use during pandemic  Neck - no cervical lymphadenopathy  Lungs - Clear to ausculation bilaterally   CVS - regular rhythm with no murmurs, rubs or gallups  Abdomen - soft, NT, ND, NABS  Ext - no cyanosis, clubbing or edema  Skin - no rash  Psychology - alert and oriented, answers appropriate        Electronically signed by:    Alfonzo Waddell MD PhD  [facility] Pulmonary and Critical Care Medicine

## 2022-08-24 LAB
A ALTERNATA IGE QN: 2.83 KU(A)/L
A FUMIGATUS IGE QN: 8.69 KU(A)/L
BERMUDA GRASS IGE QN: <0.1 KU(A)/L
C HERBARUM IGE QN: 0.62 KU(A)/L
CAT DANDER IGG QN: 60.2 KU(A)/L
CEDAR IGE QN: 0.11 KU(A)/L
COMMON RAGWEED IGE QN: <0.1 KU(A)/L
COTTONWOOD IGE QN: <0.1 KU(A)/L
D FARINAE IGE QN: 0.16 KU(A)/L
D PTERONYSS IGE QN: 0.29 KU(A)/L
DOG DANDER+EPITH IGE QN: 18.7 KU(A)/L
IGE SERPL-ACNC: 560 KU/L (ref 0–114)
MAPLE IGE QN: 0.25 KU(A)/L
MARSH ELDER IGE QN: <0.1 KU(A)/L
MOUSE URINE PROT IGE QN: <0.1 KU(A)/L
NETTLE IGE QN: 0.16 KU(A)/L
P NOTATUM IGE QN: 1.2 KU(A)/L
ROACH IGE QN: <0.1 KU(A)/L
SALTWORT IGE QN: <0.1 KU(A)/L
SILVER BIRCH IGE QN: <0.1 KU(A)/L
TIMOTHY IGE QN: 0.14 KU(A)/L
WHITE ASH IGE QN: <0.1 KU(A)/L
WHITE ELM IGE QN: <0.1 KU(A)/L
WHITE MULBERRY IGE QN: <0.1 KU(A)/L
WHITE OAK IGE QN: <0.1 KU(A)/L

## 2022-08-29 ENCOUNTER — HOSPITAL ENCOUNTER (OUTPATIENT)
Dept: ULTRASOUND IMAGING | Facility: HOSPITAL | Age: 52
Discharge: HOME | End: 2022-08-29
Attending: FAMILY MEDICINE | Admitting: RADIOLOGY
Payer: COMMERCIAL

## 2022-08-29 DIAGNOSIS — E04.1 THYROID NODULE: ICD-10-CM

## 2022-08-29 PROCEDURE — 272N000431 US BIOPSY THYROID FINE NEEDLE ASPIRATION

## 2022-08-29 PROCEDURE — 88173 CYTOPATH EVAL FNA REPORT: CPT | Mod: 26 | Performed by: PATHOLOGY

## 2022-08-29 PROCEDURE — 88172 CYTP DX EVAL FNA 1ST EA SITE: CPT | Mod: 26 | Performed by: PATHOLOGY

## 2022-08-29 PROCEDURE — 88173 CYTOPATH EVAL FNA REPORT: CPT | Mod: TC | Performed by: RADIOLOGY

## 2022-08-31 DIAGNOSIS — R05.9 COUGH: ICD-10-CM

## 2022-08-31 DIAGNOSIS — J45.40 MODERATE PERSISTENT ASTHMA WITHOUT COMPLICATION: ICD-10-CM

## 2022-08-31 LAB
PATH REPORT.COMMENTS IMP SPEC: NORMAL
PATH REPORT.COMMENTS IMP SPEC: NORMAL
PATH REPORT.FINAL DX SPEC: NORMAL
PATH REPORT.GROSS SPEC: NORMAL
PATH REPORT.MICROSCOPIC SPEC OTHER STN: NORMAL
PATH REPORT.RELEVANT HX SPEC: NORMAL

## 2022-08-31 RX ORDER — FLUTICASONE PROPIONATE AND SALMETEROL XINAFOATE 230; 21 UG/1; UG/1
AEROSOL, METERED RESPIRATORY (INHALATION)
Qty: 12 G | Refills: 4 | Status: SHIPPED | OUTPATIENT
Start: 2022-08-31 | End: 2023-02-08

## 2022-09-01 ENCOUNTER — HOSPITAL ENCOUNTER (OUTPATIENT)
Dept: SPEECH THERAPY | Facility: HOSPITAL | Age: 52
Setting detail: THERAPIES SERIES
Discharge: HOME | End: 2022-09-01
Attending: INTERNAL MEDICINE
Payer: COMMERCIAL

## 2022-09-01 ENCOUNTER — HOSPITAL ENCOUNTER (OUTPATIENT)
Dept: RADIOLOGY | Facility: HOSPITAL | Age: 52
Discharge: HOME | End: 2022-09-01
Attending: INTERNAL MEDICINE | Admitting: INTERNAL MEDICINE
Payer: COMMERCIAL

## 2022-09-01 DIAGNOSIS — R05.3 CHRONIC COUGH: ICD-10-CM

## 2022-09-01 PROCEDURE — 92611 MOTION FLUOROSCOPY/SWALLOW: CPT | Mod: GN | Performed by: SPEECH-LANGUAGE PATHOLOGIST

## 2022-09-01 PROCEDURE — 74230 X-RAY XM SWLNG FUNCJ C+: CPT

## 2022-09-01 NOTE — PROGRESS NOTES
"OUTPATIENT VIDEO SWALLOW STUDY       09/01/22 0955   General Information   Type Of Visit Initial   Start Of Care Date 09/01/22   Referring Physician Alfonzo Waddell MD   Orders Evaluate And Treat   Orders Comment \"Concern for aspiration pneumonitis and chronic aspiration\"   Medical Diagnosis asthma; chronic cough   Onset Of Illness/injury Or Date Of Surgery 08/22/22   Precautions/limitations No Known Precautions/limitations   Hearing WNL   Pertinent History of Current Problem/OT: Additional Occupational Profile Info Per provider note: \"Lesley Napoles is a 52 year old female with a past medical history significant for asthma who presents to clinic today for follow up.  Her Chest CT follow up shows some improvement of GGO, but then some new.  There is suggestion of aspiration, but also this could simply be a chronic bronchitis phenotype from uncontrolled asthma.  She is not responding to spiriva, but has a clear history of steroid responsiveness so will reattempt to double down on her inhaled steroid.  Given the refractory nature of her asthma, I do think visiting an allergist would be useful.\" Patient presents for instrumental swallow exam to assess for silent aspiration.   Respiratory Status Room air   General Observations Patient independent with activities of daily living.   Patient/family Goals To determine risk for silent aspiration.   General Information Comments Patient reported no swallowing concerns or reflux symptoms.   Pain Assessment   Pain Reported No   Clinical Swallow Evaluation   Oral Musculature generally intact   Structural Abnormalities none present   Dentition present and adequate   Mucosal Quality good   VFSS Evaluation   VFSS Additional Documentation Yes   VFSS Eval: Radiology   Radiologist Dr. Arnold   Views Taken left lateral   Physical Location of Procedure [facility] Radiology   VFSS Eval: Thin Liquid Texture Trial   Mode of Presentation, Thin Liquid " cup;straw;self-fed   Order of Presentation 1 (cup); 2, 5 (straw)   Preparatory Phase WFL   Oral Phase, Thin Liquid WFL   Pharyngeal Phase, Thin Liquid WFL  (trace BOT, vallecular residual judged WFL)   Rosenbek's Penetration Aspiration Scale: Thin Liquid Trial Results 1 - no aspiration, contrast does not enter airway   VFSS Eval: Puree Solid Texture Trial   Mode of Presentation, Puree spoon;self-fed   Order of Presentation 3   Preparatory Phase WFL   Oral Phase, Puree WFL   Pharyngeal Phase, Puree WFL   Rosenbek's Penetration Aspiration Scale: Puree Food Trial Results 1 - no aspiration, contrast does not enter airway   VFSS Eval: Regular Texture Trial (Solid)   Mode of Presentation spoon;self-fed   Order of Presentation 4   Preparatory Phase WFL   Oral Phase WFL   Pharyngeal Phase WFL  (trace vallecular residue judged WFL)   Rosenbek's Penetration Aspiration Scale 1 - no aspiration, contrast does not enter airway   Educational Assessment   Barriers to Learning No barriers   Swallow Eval: Clinical Impressions   Skilled Criteria for Therapy Intervention No problems identified which require skilled intervention   Functional Assessment Scale (FAS) 7   Treatment Diagnosis Functional oropharyngeal swallow mechanism.   Diet texture recommendations Regular diet;Thin liquids (level 0)   Therapy Frequency   (Evaluation only)   Risks and Benefits of Treatment have been explained. Yes   Patient, family and/or staff in agreement with Plan of Care Yes   Clinical Impression Comments Functional oropharyngeal swallow mechanism under fluoroscopy today. No aspiration or laryngeal penetration occurred with any consistencies assessed (thin liquid, puree or solid texture). Good oral control and AP bolus transit. Swallow response timely. Epiglottic inversion adequate. Note trace pharyngeal residue deemed WFL. Recommend continue regular diet with thin liquids given standard swallow precautions.   Total Session Time   SLP Eval:  VideoFluoroscopic Swallow function Minutes (66703) 10   Total Evaluation Time 10

## 2022-09-06 DIAGNOSIS — R09.82 PND (POST-NASAL DRIP): ICD-10-CM

## 2022-09-06 DIAGNOSIS — R05.9 COUGH: ICD-10-CM

## 2022-09-07 RX ORDER — FLUTICASONE PROPIONATE 50 MCG
2 SPRAY, SUSPENSION (ML) NASAL 2 TIMES DAILY
Qty: 16 ML | Refills: 1 | Status: SHIPPED | OUTPATIENT
Start: 2022-09-07 | End: 2023-05-08

## 2022-09-25 ENCOUNTER — HEALTH MAINTENANCE LETTER (OUTPATIENT)
Age: 52
End: 2022-09-25

## 2022-09-29 DIAGNOSIS — R05.9 COUGH: ICD-10-CM

## 2022-09-29 DIAGNOSIS — R09.82 PND (POST-NASAL DRIP): ICD-10-CM

## 2022-09-29 RX ORDER — IPRATROPIUM BROMIDE 21 UG/1
SPRAY, METERED NASAL
Qty: 30 ML | Refills: 1 | Status: SHIPPED | OUTPATIENT
Start: 2022-09-29

## 2022-10-10 ENCOUNTER — LAB REQUISITION (OUTPATIENT)
Dept: LAB | Facility: CLINIC | Age: 52
End: 2022-10-10

## 2022-10-10 DIAGNOSIS — E78.2 MIXED HYPERLIPIDEMIA: ICD-10-CM

## 2022-10-10 PROCEDURE — 80061 LIPID PANEL: CPT | Performed by: FAMILY MEDICINE

## 2022-10-11 LAB
CHOLEST SERPL-MCNC: 172 MG/DL
HDLC SERPL-MCNC: 53 MG/DL
LDLC SERPL CALC-MCNC: 103 MG/DL
NONHDLC SERPL-MCNC: 119 MG/DL
TRIGL SERPL-MCNC: 79 MG/DL

## 2022-11-15 NOTE — PROGRESS NOTES
Pulmonary Clinic Outpatient Follow-Up  11/22/2022     Assessment and Plan:   #. Asthma, moderate persistent.  #. Chronic cough.  #. Environmental allergies.  #. Waxing waning groundglass nodules in both lungs with concern for inflammatory process.  Swallow evaluation negative.      Continue w/ current combination of Advair + Pulmocort -- she is doing well w/ them & we would want her allergy symptoms under better control overall before trying to change and/or consolidate her therapy    Provided w/ Rx for nebulizer machine & DuoNeb to use during her asthma flares/exacerbations    Agree w/ the plan to see Dr. Rod in the Allergy clinic    Action Plan -- prednisone 40 mg daily x 7 days with doxycycline 100 mg PO BID  x 7 days. May repeat x1, but if fails 2 action plan activations back-to-back, will need to go the the ED.    RTC: 6 months    Jo Melendez MD  Pulmonary and Critical Care   ______________________________________________________________________________    CC: follow up    HPI:   Lesley Napoles is a 52 year old never smoker with history of moderate persistent asthma, allergies, presenting today for follow up of her breathing.  I saw her for initial evaluation 6/22/2022, and she subsequently saw Dr. Waddell in clinic on 8/22/2022 --continued on Advair with addition of Pulmicort, stopped Spiriva, was referred for video swallow study, started allergy evaluation, referred to Sleep Medicine.  VFSS negative.    Reports that the breathing is overall better, this is true for her w/ the colder weather and improvement in her allergies. Spiriva did not help her in any way, but the combination of Advair & Pulmocort seems to be helpful. She is not sure if the Atrovent has been helpful. Her last flare of asthma was in October. Cough is her predominant symptom.     Markedly positive [facility] allergen panel. IgE 560. Eosinophils not elevated.     REVIEW OF SYSTEMS: 10-point ROS was negative with exceptions as detailed in the  HPI.    Physical Exam:  /64 (BP Location: Right arm, Patient Position: Sitting, Cuff Size: Adult Large)   Pulse 75   Wt 81.7 kg (180 lb 2 oz)   SpO2 98%   Gen: alert, oriented, no distress  HEENT: masked, PERLL; no stridor  CV: RRR, no M/G/R  Resp: equal bilateral air entry, breath sounds clear throughout, no focal crackles or wheezes. Talking in full sentences w/ no respiratory distress  Abd: deferred  Skin: no apparent rashes  Ext: no cyanosis, clubbing or edema  Neuro: alert, nonfocal    Labs: personally reviewed & interpreted in EMR.   Imaging & Procedures: personally reviewed & interpreted, including formal Radiology reports in the EMR.    MEDICAL HISTORY:  has no past medical history on file.  SURGICAL HISTORY:  has no past surgical history on file.  SOCIAL HISTORY:  reports that she has never smoked. She has never used smokeless tobacco.  FAMILY HISTORY: family history is not on file.    MEDICATIONS: personally reviewed, including EMR/Care Everywhere. Pertinent information noted & updated.   ALLERGIES:   Allergies   Allergen Reactions     Articaine Swelling

## 2022-11-22 ENCOUNTER — OFFICE VISIT (OUTPATIENT)
Dept: PULMONOLOGY | Facility: OTHER | Age: 52
End: 2022-11-22
Payer: COMMERCIAL

## 2022-11-22 VITALS
HEART RATE: 75 BPM | WEIGHT: 180.13 LBS | DIASTOLIC BLOOD PRESSURE: 64 MMHG | SYSTOLIC BLOOD PRESSURE: 116 MMHG | OXYGEN SATURATION: 98 %

## 2022-11-22 DIAGNOSIS — J45.40 MODERATE PERSISTENT ASTHMA WITHOUT COMPLICATION: Primary | ICD-10-CM

## 2022-11-22 DIAGNOSIS — T78.40XD ALLERGY, SUBSEQUENT ENCOUNTER: ICD-10-CM

## 2022-11-22 PROCEDURE — 99213 OFFICE O/P EST LOW 20 MIN: CPT | Performed by: INTERNAL MEDICINE

## 2022-11-22 RX ORDER — AMOXICILLIN 500 MG
1200 CAPSULE ORAL DAILY
COMMUNITY

## 2022-11-22 RX ORDER — IPRATROPIUM BROMIDE AND ALBUTEROL SULFATE 2.5; .5 MG/3ML; MG/3ML
1 SOLUTION RESPIRATORY (INHALATION) EVERY 6 HOURS PRN
Qty: 420 ML | Refills: 1 | Status: SHIPPED | OUTPATIENT
Start: 2022-11-22

## 2022-11-22 RX ORDER — ROSUVASTATIN CALCIUM 10 MG/1
10 TABLET, COATED ORAL DAILY
COMMUNITY
Start: 2022-09-02

## 2022-11-22 ASSESSMENT — ASTHMA QUESTIONNAIRES
ACT_TOTALSCORE: 25
QUESTION_2 LAST FOUR WEEKS HOW OFTEN HAVE YOU HAD SHORTNESS OF BREATH: NOT AT ALL
QUESTION_5 LAST FOUR WEEKS HOW WOULD YOU RATE YOUR ASTHMA CONTROL: COMPLETELY CONTROLLED
QUESTION_4 LAST FOUR WEEKS HOW OFTEN HAVE YOU USED YOUR RESCUE INHALER OR NEBULIZER MEDICATION (SUCH AS ALBUTEROL): NOT AT ALL
QUESTION_3 LAST FOUR WEEKS HOW OFTEN DID YOUR ASTHMA SYMPTOMS (WHEEZING, COUGHING, SHORTNESS OF BREATH, CHEST TIGHTNESS OR PAIN) WAKE YOU UP AT NIGHT OR EARLIER THAN USUAL IN THE MORNING: NOT AT ALL
ACT_TOTALSCORE: 25
QUESTION_1 LAST FOUR WEEKS HOW MUCH OF THE TIME DID YOUR ASTHMA KEEP YOU FROM GETTING AS MUCH DONE AT WORK, SCHOOL OR AT HOME: NONE OF THE TIME

## 2022-11-22 NOTE — PATIENT INSTRUCTIONS
Plan:  Continue with the combination of Advair & Pulmocort -- this is working for her. Just remember to rinse and gargle your mouth and throat after using these inhalers.   Continue with your allergy medications.  I recommend trying a nasal wash (Neti Pot or similar). The most common brand is Valentino Med.   I think you will do great with Dr. Rod in Allergy.     You should follow up in 6 month.     If you have any questions or concerns, please, call our clinic at [phone].

## 2022-12-29 ENCOUNTER — OFFICE VISIT (OUTPATIENT)
Dept: ALLERGY | Facility: CLINIC | Age: 52
End: 2022-12-29
Attending: INTERNAL MEDICINE
Payer: COMMERCIAL

## 2022-12-29 VITALS — OXYGEN SATURATION: 97 % | WEIGHT: 177.7 LBS | RESPIRATION RATE: 18 BRPM | HEART RATE: 87 BPM

## 2022-12-29 DIAGNOSIS — J45.41 MODERATE PERSISTENT ASTHMA WITH EXACERBATION: Primary | ICD-10-CM

## 2022-12-29 DIAGNOSIS — J30.89 NON-SEASONAL ALLERGIC RHINITIS DUE TO OTHER ALLERGIC TRIGGER: ICD-10-CM

## 2022-12-29 DIAGNOSIS — R05.3 CHRONIC COUGH: ICD-10-CM

## 2022-12-29 DIAGNOSIS — J45.40 MODERATE PERSISTENT EXTRINSIC ASTHMA WITHOUT COMPLICATION: ICD-10-CM

## 2022-12-29 PROCEDURE — 99244 OFF/OP CNSLTJ NEW/EST MOD 40: CPT | Performed by: ALLERGY & IMMUNOLOGY

## 2022-12-29 RX ORDER — PREDNISONE 20 MG/1
20 TABLET ORAL 2 TIMES DAILY
Qty: 10 TABLET | Refills: 0 | Status: SHIPPED | OUTPATIENT
Start: 2022-12-29 | End: 2023-10-18

## 2022-12-29 NOTE — PROGRESS NOTES
Marj Stuton is a 52 year old, presenting for the following health issues:  Allergy Consult (Cough for about a year, sees a lung provider)      HPI     Chief complaint: Cough    History of present illness: This is a pleasant 52-year-old woman I was asked to see for evaluation by Dr. Waddell in regards to cough.  Patient states that she has had this cough only this year.  She states has had a longstanding history of asthma.  She is currently working with a pulmonologist.  She has been now placed on Advair 230/21 2 puffs twice daily as well as Pulmicort inhaler 180 mcg daily.  She reports with this her cough is slightly better but still persist.  The cough occasionally wakes her up from sleep.  She states that she is currently living with a dog and she does note that she has a history of dog allergy.  She has a longstanding history of allergies and was tested many years ago for this.  Specific IgE testing was sent this summer and she was positive to pollens, molds, dust and animals.  She does take montelukast and Allegra as well as Flonase.  She does believe the cough is coming from her allergies.  She has a history of sinusitis and has had sinus surgery x2.  Of note, she did have a CT scan of the chest that showed some bronchitis opacities.  She also had a pulmonary function test performed that showed borderline mild obstruction.  No reversibility.    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      Objective    Pulse 87   Resp 18   Wt 80.6 kg (177 lb 11.2 oz)   SpO2 97%   There is no height or weight on file to calculate BMI.  Physical Exam   Gen: Pleasant female not  in acute distress  HEENT: Eyes no erythema of the bulbar or palpebral conjunctiva, no edema. Ears: TMs well visualized, no effusions. Nose: No congestion, mucosa normal. Mouth: Throat clear, no lip or tongue edema.   Cardiac: Regular rate and rhythm, no murmurs, rubs or gallops  Respiratory: Clear to auscultation bilaterally, no adventitious breath sounds  Lymph: No visible supraclavicular or cervical lymphadenopathy  Skin: No rashes or lesions  Psych: Alert and oriented times 3    Impression report and plan:  1.  Moderate persistent asthma  2.  Allergic rhinitis    I am not sure I have many more medication options for her but I think she could consider allergy shots.  Today she has some wheezing on exam so I did give her a burst of prednisone.  I stated her asthma must be as well-controlled as possible to undergo allergy immunotherapy.  May consider checking peak flows prior to each shot.  Reassuring that she has normal lung function.  I would like to bring reaction allergy testing.  She is currently on her antihistamine.  At that time, discussed allergy shots in more detail.  Could consider Dupixent as well given her need for prednisone today.

## 2022-12-29 NOTE — PATIENT INSTRUCTIONS
Tuesday at 3 pm---allergy testing    Allegra --hold    Options:  Allergy shots versus Dupixent    Prednisone 20 mg twice daily for 3-5 days

## 2022-12-29 NOTE — LETTER
12/29/2022         RE: Lesley Napoles  [address]  [place] [address]        Dear Colleague,    Thank you for referring your patient, Lesley Napoles, to the [facility]. Please see a copy of my visit note below.          Subjective   Lesley is a 52 year old, presenting for the following health issues:  Allergy Consult (Cough for about a year, sees a lung provider)      HPI     Chief complaint: Cough    History of present illness: This is a pleasant 52-year-old woman I was asked to see for evaluation by Dr. Waddell in regards to cough.  Patient states that she has had this cough only this year.  She states has had a longstanding history of asthma.  She is currently working with a pulmonologist.  She has been now placed on Advair 230/21 2 puffs twice daily as well as Pulmicort inhaler 180 mcg daily.  She reports with this her cough is slightly better but still persist.  The cough occasionally wakes her up from sleep.  She states that she is currently living with a dog and she does note that she has a history of dog allergy.  She has a longstanding history of allergies and was tested many years ago for this.  Specific IgE testing was sent this summer and she was positive to pollens, molds, dust and animals.  She does take montelukast and Allegra as well as Flonase.  She does believe the cough is coming from her allergies.  She has a history of sinusitis and has had sinus surgery x2.  Of note, she did have a CT scan of the chest that showed some bronchitis opacities.  She also had a pulmonary function test performed that showed borderline mild obstruction.  No reversibility.    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     Objective    Pulse 87   Resp 18   Wt 80.6 kg (177 lb 11.2 oz)   SpO2 97%   There is no height or weight on file to calculate BMI.  Physical Exam   Gen: Pleasant female not in acute distress  HEENT: Eyes no erythema of the bulbar or palpebral conjunctiva, no edema. Ears: TMs well visualized, no effusions. Nose: No congestion, mucosa normal. Mouth: Throat clear, no lip or tongue edema.   Cardiac: Regular rate and rhythm, no murmurs, rubs or gallops  Respiratory: Clear to auscultation bilaterally, no adventitious breath sounds  Lymph: No visible supraclavicular or cervical lymphadenopathy  Skin: No rashes or lesions  Psych: Alert and oriented times 3    Impression report and plan:  1.  Moderate persistent asthma  2.  Allergic rhinitis    I am not sure I have many more medication options for her but I think she could consider allergy shots.  Today she has some wheezing on exam so I did give her a burst of prednisone.  I stated her asthma must be as well-controlled as possible to undergo allergy immunotherapy.  May consider checking peak flows prior to each shot.  Reassuring that she has normal lung function.  I would like to bring reaction allergy testing.  She is currently on her antihistamine.  At that time, discussed allergy shots in more detail.  Could consider Dupixent as well given her need for prednisone today.                 Again, thank you for allowing me to participate in the care of your patient.        Sincerely,        Galina GRACIA MD

## 2023-01-12 ENCOUNTER — OFFICE VISIT (OUTPATIENT)
Dept: ALLERGY | Facility: CLINIC | Age: 53
End: 2023-01-12
Payer: COMMERCIAL

## 2023-01-12 ENCOUNTER — TRANSFERRED RECORDS (OUTPATIENT)
Dept: HEALTH INFORMATION MANAGEMENT | Facility: CLINIC | Age: 53
End: 2023-01-12

## 2023-01-12 VITALS
WEIGHT: 172 LBS | OXYGEN SATURATION: 96 % | RESPIRATION RATE: 16 BRPM | HEART RATE: 71 BPM | HEIGHT: 67 IN | BODY MASS INDEX: 27 KG/M2

## 2023-01-12 DIAGNOSIS — J30.89 ALLERGIC RHINITIS DUE TO DUST MITE: ICD-10-CM

## 2023-01-12 DIAGNOSIS — J45.40 MODERATE PERSISTENT ASTHMA WITHOUT COMPLICATION: ICD-10-CM

## 2023-01-12 DIAGNOSIS — J30.81 ALLERGIC RHINITIS DUE TO ANIMALS: ICD-10-CM

## 2023-01-12 DIAGNOSIS — J30.1 SEASONAL ALLERGIC RHINITIS DUE TO POLLEN: Primary | ICD-10-CM

## 2023-01-12 DIAGNOSIS — J30.89 ALLERGIC RHINITIS CAUSED BY MOLD: ICD-10-CM

## 2023-01-12 LAB
FEF 25/75: NORMAL
FEV-1: NORMAL
FEV1/FVC: NORMAL
FVC: NORMAL

## 2023-01-12 PROCEDURE — 99214 OFFICE O/P EST MOD 30 MIN: CPT | Mod: 25 | Performed by: ALLERGY & IMMUNOLOGY

## 2023-01-12 PROCEDURE — 94010 BREATHING CAPACITY TEST: CPT | Performed by: ALLERGY & IMMUNOLOGY

## 2023-01-12 PROCEDURE — 95004 PERQ TESTS W/ALRGNC XTRCS: CPT | Performed by: ALLERGY & IMMUNOLOGY

## 2023-01-12 RX ORDER — EPINEPHRINE 0.3 MG/.3ML
INJECTION SUBCUTANEOUS
Qty: 4 EACH | Refills: 0 | Status: SHIPPED | OUTPATIENT
Start: 2023-01-12

## 2023-01-12 NOTE — LETTER
"    1/12/2023         RE: Lesley Napoles  [address] [place] [facility] [address]        Dear Colleague,    Thank you for referring your patient, Lesley Napoles, to the [facility]. Please see a copy of my visit note below.          Subjective   Lesley is a 52 year old, presenting for the following health issues:  Allergy Consult (Allergy testing)      HPI     Chief complaint:  Allergies    History of present illness: This is a pleasant 52-year-old woman I saw at the end of December for allergies.  She has a history of moderate to severe asthma.  At that time she had some wheezing and gave her some prednisone.  She would like to consider allergy shots and we stated that we would have to be very careful in regards to immunotherapy given that her asthma has been somewhat active.  She continues on Advair as well as a Pulmicort inhaler.  She is followed by pulmonary.           Objective    Pulse 71   Resp 16   Ht 1.702 m (5' 7\")   Wt 78 kg (172 lb)   SpO2 96%   BMI 26.94 kg/m    Body mass index is 26.94 kg/m .  Physical Exam     Gen: Pleasant female not in acute distress  HEENT: Eyes no erythema of the bulbar or palpebral conjunctiva, no edema.  Respiratory: Clear to auscultation bilaterally, no adventitious breath sounds    Skin: No rashes or lesions  Psych: Alert and oriented times 3    30 percutaneous test were placed to the environmental skin test panel.  Positive control with a positive test to tree pollen, DP dust mite, cockroach, molds, weed pollen, cat and dog.  Please see scanned photograph.    Spirometry was performed.  FEV1 to FVC ratio 71%.  FEV1 2.54 L or 82% of predicted.  FVC 3.58 L or 91% of predicted.  Mild obstructive pattern.    Impression report and plan:  1.  Allergic rhinitis  2.  Moderate persistent asthma    Patient's spirometry is rather unchanged from her May pulmonary function test.  For this reason, I would offer her allergy shots I would start of a silver vial.  " Include her allergens are positive on the specific IgE testing as well.  I went over the risks and benefits of allergy shots.  I stated risks include hives, swelling, shortness of breath.  I did state that one in 2.5 million shot administrations can result in death.  I stated they must wait in the office for 30 minutes following the shot and carry an epinephrine device on the day of the shot.  I stated that shots are effective in about 90% of patients.  I stated that they should check with the insurance company prior to proceeding.  They understand the risks and benefits and agreed to proceed..  I did state asthma is not well controlled, higher risk of systemic reaction.  Consent forms were signed today.      Time spent with patient, chart review and documentation, 30 minutes on date of service.          Again, thank you for allowing me to participate in the care of your patient.        Sincerely,        Galina GRACIA MD

## 2023-01-12 NOTE — PROGRESS NOTES
"      Marj Sutton is a 52 year old, presenting for the following health issues:  Allergy Consult (Allergy testing)      HPI     Chief complaint:  Allergies    History of present illness: This is a pleasant 52-year-old woman I saw at the end of December for allergies.  She has a history of moderate to severe asthma.  At that time she had some wheezing and gave her some prednisone.  She would like to consider allergy shots and we stated that we would have to be very careful in regards to immunotherapy given that her asthma has been somewhat active.  She continues on Advair as well as a Pulmicort inhaler.  She is followed by pulmonary.            Objective    Pulse 71   Resp 16   Ht 1.702 m (5' 7\")   Wt 78 kg (172 lb)   SpO2 96%   BMI 26.94 kg/m    Body mass index is 26.94 kg/m .  Physical Exam      Gen: Pleasant female not in acute distress  HEENT: Eyes no erythema of the bulbar or palpebral conjunctiva, no edema.  Respiratory: Clear to auscultation bilaterally, no adventitious breath sounds    Skin: No rashes or lesions  Psych: Alert and oriented times 3    30 percutaneous test were placed to the environmental skin test panel.  Positive control with a positive test to tree pollen, DP dust mite, cockroach, molds, weed pollen, cat and dog.  Please see scanned photograph.    Spirometry was performed.  FEV1 to FVC ratio 71%.  FEV1 2.54 L or 82% of predicted.  FVC 3.58 L or 91% of predicted.  Mild obstructive pattern.    Impression report and plan:  1.  Allergic rhinitis  2.  Moderate persistent asthma    Patient's spirometry is rather unchanged from her May pulmonary function test.  For this reason, I would offer her allergy shots I would start of a silver vial.  Include her allergens are positive on the specific IgE testing as well.  I went over the risks and benefits of allergy shots.  I stated risks include hives, swelling, shortness of breath.  I did state that one in 2.5 million shot administrations can " result in death.  I stated they must wait in the office for 30 minutes following the shot and carry an epinephrine device on the day of the shot.  I stated that shots are effective in about 90% of patients.  I stated that they should check with the insurance company prior to proceeding.  They understand the risks and benefits and agreed to proceed..  I did state asthma is not well controlled, higher risk of systemic reaction.  Consent forms were signed today.      Time spent with patient, chart review and documentation, 30 minutes on date of service.

## 2023-01-13 DIAGNOSIS — J30.89 ALLERGIC RHINITIS CAUSED BY MOLD: ICD-10-CM

## 2023-01-13 DIAGNOSIS — J30.1 SEASONAL ALLERGIC RHINITIS DUE TO POLLEN: Primary | ICD-10-CM

## 2023-01-13 DIAGNOSIS — J30.81 ALLERGIC RHINITIS DUE TO ANIMALS: ICD-10-CM

## 2023-01-13 DIAGNOSIS — J30.89 ALLERGIC RHINITIS DUE TO DUST MITE: ICD-10-CM

## 2023-01-17 ENCOUNTER — TELEPHONE (OUTPATIENT)
Dept: ALLERGY | Facility: CLINIC | Age: 53
End: 2023-01-17

## 2023-01-17 NOTE — TELEPHONE ENCOUNTER
FADITCAPRIL and schedule 6 shot only appts and then Follow-up with Dr Rod. Reminded we are doing a peak flow on her, she needs to be healthy when receiving her shots and make sure to  epipen and bring to each shot with her.

## 2023-01-26 ENCOUNTER — ALLIED HEALTH/NURSE VISIT (OUTPATIENT)
Dept: ALLERGY | Facility: CLINIC | Age: 53
End: 2023-01-26
Payer: COMMERCIAL

## 2023-01-26 DIAGNOSIS — J30.1 SEASONAL ALLERGIC RHINITIS DUE TO POLLEN: Primary | ICD-10-CM

## 2023-01-26 DIAGNOSIS — J30.81 ALLERGIC RHINITIS DUE TO ANIMALS: ICD-10-CM

## 2023-01-26 DIAGNOSIS — J30.89 ALLERGIC RHINITIS CAUSED BY MOLD: ICD-10-CM

## 2023-01-26 DIAGNOSIS — J30.89 ALLERGIC RHINITIS DUE TO DUST MITE: ICD-10-CM

## 2023-01-26 PROCEDURE — 95117 IMMUNOTHERAPY INJECTIONS: CPT

## 2023-01-26 PROCEDURE — 95165 ANTIGEN THERAPY SERVICES: CPT | Performed by: ALLERGY & IMMUNOLOGY

## 2023-01-26 NOTE — PROGRESS NOTES
MM/DFM/DPM/AP DOG  1:1000 V/V EXP 3/19/2023  1:100 V/V EXP 5/19/2023  1:10 V/V EXP 1/19/2024  1:1 V/V EXP 1/19/2024    CHECKED BY PB  CHARGED 30 UNITS

## 2023-01-26 NOTE — PROGRESS NOTES
Lesley Napoles presents to clinic today at the request of Galina Rod MD (ordering provider) for Allergy Immunotherapy injection(s).       This service provided today was under the care of Galina Rod MD; the supervising provider of the day; who was available if needed.      Patient presented after waiting 30 minutes with no reaction to  injections. Discharged from clinic.    Dorota Joshi RN

## 2023-01-30 DIAGNOSIS — J30.81 ALLERGIC RHINITIS DUE TO ANIMALS: ICD-10-CM

## 2023-01-30 DIAGNOSIS — J30.89 ALLERGIC RHINITIS DUE TO DUST MITE: ICD-10-CM

## 2023-01-30 DIAGNOSIS — J30.1 SEASONAL ALLERGIC RHINITIS DUE TO POLLEN: Primary | ICD-10-CM

## 2023-01-30 DIAGNOSIS — J30.89 ALLERGIC RHINITIS CAUSED BY MOLD: ICD-10-CM

## 2023-01-30 PROCEDURE — 95165 ANTIGEN THERAPY SERVICES: CPT | Performed by: ALLERGY & IMMUNOLOGY

## 2023-01-30 NOTE — PROGRESS NOTES
GRASS/WEEDS/TREE/CAT  1:1000 V/V EXP 3/24/2023  1:100 V/V EXP 5/24/2023  1:10 V/V EXP 1/24/2024  1:1 V/V EXP 1/24/2024    CHECKED BY PB  CHARGED 30 UNITS

## 2023-02-02 ENCOUNTER — ALLIED HEALTH/NURSE VISIT (OUTPATIENT)
Dept: ALLERGY | Facility: CLINIC | Age: 53
End: 2023-02-02
Payer: COMMERCIAL

## 2023-02-02 DIAGNOSIS — J30.89 ALLERGIC RHINITIS DUE TO DUST MITE: ICD-10-CM

## 2023-02-02 DIAGNOSIS — J30.81 ALLERGIC RHINITIS DUE TO ANIMALS: ICD-10-CM

## 2023-02-02 DIAGNOSIS — J30.89 ALLERGIC RHINITIS CAUSED BY MOLD: ICD-10-CM

## 2023-02-02 DIAGNOSIS — J30.1 SEASONAL ALLERGIC RHINITIS DUE TO POLLEN: Primary | ICD-10-CM

## 2023-02-02 PROCEDURE — 95117 IMMUNOTHERAPY INJECTIONS: CPT

## 2023-02-09 ENCOUNTER — ALLIED HEALTH/NURSE VISIT (OUTPATIENT)
Dept: ALLERGY | Facility: CLINIC | Age: 53
End: 2023-02-09
Payer: COMMERCIAL

## 2023-02-09 DIAGNOSIS — J30.1 SEASONAL ALLERGIC RHINITIS DUE TO POLLEN: Primary | ICD-10-CM

## 2023-02-09 PROCEDURE — 95117 IMMUNOTHERAPY INJECTIONS: CPT

## 2023-02-16 ENCOUNTER — ALLIED HEALTH/NURSE VISIT (OUTPATIENT)
Dept: ALLERGY | Facility: CLINIC | Age: 53
End: 2023-02-16
Payer: COMMERCIAL

## 2023-02-16 DIAGNOSIS — J30.89 ALLERGIC RHINITIS DUE TO DUST MITE: ICD-10-CM

## 2023-02-16 DIAGNOSIS — J30.1 SEASONAL ALLERGIC RHINITIS DUE TO POLLEN: Primary | ICD-10-CM

## 2023-02-16 DIAGNOSIS — J30.81 ALLERGIC RHINITIS DUE TO ANIMALS: ICD-10-CM

## 2023-02-16 DIAGNOSIS — J30.89 ALLERGIC RHINITIS CAUSED BY MOLD: ICD-10-CM

## 2023-02-16 PROCEDURE — 95117 IMMUNOTHERAPY INJECTIONS: CPT

## 2023-02-16 NOTE — PROGRESS NOTES
Lesley Napoles presents to clinic today at the request of Galina Rod MD (ordering provider) for Allergy Immunotherapy injection(s).       This service provided today was under the care of Galina Rod MD; the supervising provider of the day; who was available if needed.      Patient presented after waiting 30 minutes with no reaction to  injections. Discharged from clinic.    Petra Hernandez RN

## 2023-02-21 ENCOUNTER — ALLIED HEALTH/NURSE VISIT (OUTPATIENT)
Dept: ALLERGY | Facility: CLINIC | Age: 53
End: 2023-02-21
Payer: COMMERCIAL

## 2023-02-21 DIAGNOSIS — J30.1 SEASONAL ALLERGIC RHINITIS DUE TO POLLEN: Primary | ICD-10-CM

## 2023-02-21 PROCEDURE — 95117 IMMUNOTHERAPY INJECTIONS: CPT

## 2023-03-02 ENCOUNTER — ALLIED HEALTH/NURSE VISIT (OUTPATIENT)
Dept: ALLERGY | Facility: CLINIC | Age: 53
End: 2023-03-02
Payer: COMMERCIAL

## 2023-03-02 DIAGNOSIS — J30.1 SEASONAL ALLERGIC RHINITIS DUE TO POLLEN: Primary | ICD-10-CM

## 2023-03-02 DIAGNOSIS — J30.81 ALLERGIC RHINITIS DUE TO ANIMALS: ICD-10-CM

## 2023-03-02 DIAGNOSIS — J30.89 ALLERGIC RHINITIS DUE TO DUST MITE: ICD-10-CM

## 2023-03-02 DIAGNOSIS — J30.89 ALLERGIC RHINITIS CAUSED BY MOLD: ICD-10-CM

## 2023-03-02 PROCEDURE — 95117 IMMUNOTHERAPY INJECTIONS: CPT

## 2023-03-16 ENCOUNTER — OFFICE VISIT (OUTPATIENT)
Dept: ALLERGY | Facility: CLINIC | Age: 53
End: 2023-03-16
Payer: COMMERCIAL

## 2023-03-16 VITALS — BODY MASS INDEX: 27 KG/M2 | RESPIRATION RATE: 16 BRPM | WEIGHT: 172 LBS | HEIGHT: 67 IN

## 2023-03-16 DIAGNOSIS — J30.1 SEASONAL ALLERGIC RHINITIS DUE TO POLLEN: Primary | ICD-10-CM

## 2023-03-16 PROCEDURE — 95117 IMMUNOTHERAPY INJECTIONS: CPT | Performed by: ALLERGY & IMMUNOLOGY

## 2023-03-16 PROCEDURE — 99213 OFFICE O/P EST LOW 20 MIN: CPT | Mod: 25 | Performed by: ALLERGY & IMMUNOLOGY

## 2023-03-16 NOTE — PROGRESS NOTES
"      Subjective   Lesley is a 52 year old, presenting for the following health issues:  Allergy Injection and RECHECK      HPI     Chief complaint:  Allergy follow-up    History of Present illness:  This is a pleasant 52 year old woman here today for follow-up of allergy shots.  Recently started allergy shots and moving into her blue vial.  She has noted some site reactions no systemic reactions.  She does take Allegra daily.  She has a history of allergies and asthma.  She reports her asthma is well controlled and she continues on Advair.  She has no other allergy concerns.  Denies any cough, wheeze or shortness of breath.        Objective    Resp 16   Ht 1.702 m (5' 7\")   Wt 78 kg (172 lb)   BMI 26.94 kg/m    Body mass index is 26.94 kg/m .  Physical Exam   Gen: Pleasant female not in acute distress  HEENT: Eyes no erythema of the bulbar or palpebral conjunctiva, no edema.      Respiratory: Clear to auscultation bilaterally, no adventitious breath sounds    Skin: No rashes or lesions  Psych: Alert and oriented times 3    Impression report and plan:  1.  Allergic rhinitis   2.  Moderate persistent asthma    Continue allergy shots.  Continue current medication therapy.  Notify of systemic reaction.  Follow in 6 weeks.  Patient understands she should not return for allergy shots if her asthma becomes active.              "

## 2023-03-16 NOTE — PROGRESS NOTES
Lesley aNpoles presents to clinic today at the request of Galina Rod MD (ordering provider) for Allergy Immunotherapy injection(s).       This service provided today was under the care of Galina Rod MD; the supervising provider of the day; who was available if needed.      Patient presented after waiting 30 minutes with no reaction to  injections. Discharged from clinic.    Dorota Joshi RN

## 2023-03-16 NOTE — LETTER
"    3/16/2023         RE: Lesley Napoles  [address] [facility] [address]        Dear Colleague,    Thank you for referring your patient, Lesley Napoles, to the [facility]. Please see a copy of my visit note below.          Subjective   Lesley is a 52 year old, presenting for the following health issues:  Allergy Injection and RECHECK      HPI     Chief complaint:  Allergy follow-up    History of Present illness:  This is a pleasant 52 year old woman here today for follow-up of allergy shots.  Recently started allergy shots and moving into her blue vial.  She has noted some site reactions no systemic reactions.  She does take Allegra daily.  She has a history of allergies and asthma.  She reports her asthma is well controlled and she continues on Advair.  She has no other allergy concerns.  Denies any cough, wheeze or shortness of breath.       Objective    Resp 16   Ht 1.702 m (5' 7\")   Wt 78 kg (172 lb)   BMI 26.94 kg/m    Body mass index is 26.94 kg/m .  Physical Exam   Gen: Pleasant female not in acute distress  HEENT: Eyes no erythema of the bulbar or palpebral conjunctiva, no edema.      Respiratory: Clear to auscultation bilaterally, no adventitious breath sounds    Skin: No rashes or lesions  Psych: Alert and oriented times 3    Impression report and plan:  1.  Allergic rhinitis   2.  Moderate persistent asthma    Continue allergy shots.  Continue current medication therapy.  Notify of systemic reaction.  Follow in 6 weeks.  Patient understands she should not return for allergy shots if her asthma becomes active.               Lesley Napoles presents to clinic today at the request of Galina Rod MD (ordering provider) for Allergy Immunotherapy injection(s).       This service provided today was under the care of Galina Rod MD; the supervising provider of the day; who was available if needed.      Patient presented after waiting 30 minutes with no reaction to  " injections. Discharged from clinic.    Dorota Joshi RN          Again, thank you for allowing me to participate in the care of your patient.        Sincerely,        Galina GRACIA MD

## 2023-03-23 ENCOUNTER — ALLIED HEALTH/NURSE VISIT (OUTPATIENT)
Dept: ALLERGY | Facility: CLINIC | Age: 53
End: 2023-03-23
Payer: COMMERCIAL

## 2023-03-23 DIAGNOSIS — J30.89 ALLERGIC RHINITIS DUE TO DUST MITE: ICD-10-CM

## 2023-03-23 DIAGNOSIS — J30.89 ALLERGIC RHINITIS CAUSED BY MOLD: ICD-10-CM

## 2023-03-23 DIAGNOSIS — J30.1 SEASONAL ALLERGIC RHINITIS DUE TO POLLEN: Primary | ICD-10-CM

## 2023-03-23 DIAGNOSIS — J30.81 ALLERGIC RHINITIS DUE TO ANIMALS: ICD-10-CM

## 2023-03-23 PROCEDURE — 95117 IMMUNOTHERAPY INJECTIONS: CPT

## 2023-03-30 ENCOUNTER — ALLIED HEALTH/NURSE VISIT (OUTPATIENT)
Dept: ALLERGY | Facility: CLINIC | Age: 53
End: 2023-03-30
Payer: COMMERCIAL

## 2023-03-30 DIAGNOSIS — J30.81 ALLERGIC RHINITIS DUE TO ANIMALS: ICD-10-CM

## 2023-03-30 DIAGNOSIS — J30.89 ALLERGIC RHINITIS DUE TO DUST MITE: ICD-10-CM

## 2023-03-30 DIAGNOSIS — J30.1 SEASONAL ALLERGIC RHINITIS DUE TO POLLEN: Primary | ICD-10-CM

## 2023-03-30 PROCEDURE — 95117 IMMUNOTHERAPY INJECTIONS: CPT

## 2023-04-05 NOTE — PATIENT INSTRUCTIONS
April 18, 2023       Aga Mendez MD  [address] [facility] [address]  Via In Basket      Patient: Raj Spencer   YOB: 1952   Date of Visit: 4/5/2023       Dear Dr. Mendez:    I saw your patient, Raj Spencer, for an evaluation. Below are my notes for this visit with him.    If you have questions, please do not hesitate to call me.      Sincerely,        Maria Antonia Bradford MD        CC: No Recipients  Maria Antonia Bradford MD  4/18/2023 10:11 PM  Signed        DATE: 4/5/2023  PATIENT: Raj Spencer  YOB: 1952  MRN: [id number]  Referred By: Aga Mendez MD   PCP: Aga Mendez MD  HEME/ONC PHYSICIAN: Dr. Wendy Bradford    Reason for consult:   Liver lesions    CHIEF COMPLAINT  Raj Spencer is a 70 year old male whom I am seeing at the kind request of Aga Mendez MD for further evaluation and management of Liver lesions.     HISTORY OF PRESENT ILLNESS  In November 2022, patient was admitted for abdominal pain.  During this time he had a CT scan of abdomen pelvis done which showed new consolidative infiltrates posterior aspect right lower lobe partially imaged, 2 cm hypodense lesion in the liver and smaller hypodense lesions in the liver anteriorly as well.  An MRI was recommended.  He had a follow-up MRI done on 2/14/2023 which showed \"multiple slightly T2 hyperintense lesions in the posterior right hepatic lobe, all of which demonstrate corresponding peripheral enhancement concerning for metastases.\"  He was referred to medical oncology for further management.    He states that he has had poor appetite for the past few months and has lost about 30 pounds in the last 3 to 4 years.  He has a history of heroin use and is currently on methadone 50.  He has a history of smoking about half pack per day for 50 years.  He is also experiencing some fatigue.  Denies any bleeding.  He has been also referred to GI and he has an appointment later this month.    4/5/23  He had biopsy done of  Lung function unchanged    Allergy shots--would start low and slow    Peak flow prior to each shot    No shots when asthma active           the liver. Unable to get the CT chest done for staging.   Doing well.      Patient's medications, allergies, past medical, surgical, social and family histories were reviewed and updated as appropriate.    PAST MEDICAL HISTORY  Past Medical History:   Diagnosis Date   • A-fib (CMD)    • BPH (benign prostatic hyperplasia)    • Chronic insomnia    • COPD (chronic obstructive pulmonary disease) (CMD)    • COVID-19 virus infection 11/10/2022   • Depression    • Emphysema, unspecified (CMD)    • Essential (primary) hypertension    • Gastroesophageal reflux disease    • Heroin abuse (CMD)    • History of esophagogastroduodenoscopy (EGD) 02/09/2021    Repeat EGD in 3 Years. (02/09/2024)   • History of home oxygen therapy    • HTN (hypertension)    • Hx of hepatitis C     s/p treatment in 7/2019   • Methadone dependence (CMD)    • Sepsis due to pneumonia (CMD) 3/30/2022   • Underweight        PAST SURGICAL HISTORY  Past Surgical History:   Procedure Laterality Date   • Abdomen surgery     • Anes ercp w place endoscopic stent in [place] pancreatic oscar  03/20/2020    Dr. Jamie Morton   • Cholecystectomy     • Ercp w/ sphicterotomy  03/20/2020    Dr. Jamie oMrton   • Gallbladder surgery     • Gastrectomy     • Service to gastroenterology     • Stomach surgery         FAMILY MEDICAL HISTORY  Family History   Problem Relation Age of Onset   • Stroke Mother    • Hypertension Mother    • Stroke Sister    • Hypertension Sister        SOCIAL HISTORY   Social History     Socioeconomic History   • Marital status: Single     Spouse name: Not on file   • Number of children: Not on file   • Years of education: Not on file   • Highest education level: Not on file   Occupational History   • Not on file   Tobacco Use   • Smoking status: Every Day     Packs/day: 0.50     Types: Cigarettes   • Smokeless tobacco: Never   Vaping Use   • Vaping Use: never used   Substance and Sexual Activity   • Alcohol use: Not Currently   • Drug use: Not Currently      Types: Heroin, Opioids, Marijuana     Comment: [facility]    • Sexual activity: Yes   Other Topics Concern   • Not on file   Social History Narrative    Pt lives with his fiance. He has two adult daughters. (info as of 10/7/19)        Pt's significant other passed away on 9/12/20 (info as of 11/10/20)     Social Determinants of Health     Financial Resource Strain: Low Risk    • Social Determinants: Financial Resource Strain: None   Food Insecurity: No Food Insecurity   • Social Determinants: Food Insecurity: Rarely   Transportation Needs: Not on file   Physical Activity: Inactive   • Days of Exercise per Week: 0 days   • Minutes of Exercise per Session: 0 min   Stress: Low Risk    • Social Determinants: Stress: A little bit   Social Connections: Somewhat Isolated   • Social Determinants: Social Connections: 1 or 2 times a week   Intimate Partner Violence: Not At Risk   • Social Determinants: Intimate Partner Violence Past Fear: No   • Social Determinants: Intimate Partner Violence Current Fear: No       MEDICATIONS  Current Outpatient Medications   Medication Sig   • bisacodyl (Dulcolax) 5 MG EC tablet Take both tablets at 10 PM the night before your colonoscopy.   • Sodium Sulfate-Mag Sulfate-KCl (Sutab) 4425-559-941 MG Tab Take 12 tablets by mouth every 10 hours.   • albuterol 108 (90 Base) MCG/ACT inhaler Inhale 2 puffs into the lungs every 4 hours as needed for Shortness of Breath or Wheezing.   • carvedilol (COREG) 6.25 MG tablet Take 1 tablet by mouth in the morning and 1 tablet in the evening. Take with meals.   • pantoprazole (PROTONIX) 40 MG tablet Take 1 tablet by mouth daily. .   • buPROPion (WELLBUTRIN SR) 150 MG 12 hr tablet Take 1 tablet by mouth 2 times daily.   • amLODIPine (NORVASC) 5 MG tablet Take 1 tablet by mouth daily.   • mirtazapine (REMERON) 30 MG tablet 1 tab once daily for with dinner for sleep- increase from 15mg on 9/1/21- do NOT take with any med that makes you sleepy   •  apixaBAN (ELIQUIS) 5 MG Tab Take 1 tablet by mouth every 12 hours.   • polyethylene glycol (MIRALAX) 17 g packet Take 17 g by mouth daily. Stir and dissolve powder in any 4 to 8 ounces of beverage, then drink.   • fluticasone propionate (FLOVENT HFA) 220 MCG/ACT inhaler Inhale 1 puff into the lungs in the morning and 1 puff in the evening.   • METHADONE HCL PO Take 50 mg by mouth daily.   • naLOXone (NARCAN) 4 MG/0.1ML nasal spray Spray the content of 1 device into 1 nostril. Call 911. May repeat with 2nd device in alternate nostril if no response in 2-3 minutes.     No current facility-administered medications for this visit.       ALLERGIES  ALLERGIES:  No Known Allergies      REVIEW OF SYSTEMS    Psychosocial assessment was obtained. Intervention was not necessary.    ECOG Performance Status: 1 - No physically strenuous activity, but ambulatory and able to carry out light or sedentary work.    Pain Scale:   Vitals:    04/05/23 1343   PainSc:  0       Complete review of systems is Negative except stated in HPI      VITALS  Visit Vitals  /70 (BP Location: RUE - Right upper extremity, Patient Position: Sitting, Cuff Size: Large Adult)   Pulse 76   Temp 97.7 °F (36.5 °C) (Oral)   Resp 16   Ht 6' (1.829 m)   Wt 66.2 kg (145 lb 15.1 oz)   SpO2 97%   BMI 19.79 kg/m²     Body Surface Area: Body surface area is 1.86 meters squared.    PHYSICAL EXAM  Physical Exam  Vitals reviewed.   Constitutional:       General: He is not in acute distress.     Comments: thin   HENT:      Head: Normocephalic and atraumatic.      Nose: No congestion or rhinorrhea.      Mouth/Throat:      Mouth: Mucous membranes are moist.      Neck: Normal range of motion and neck supple.   Eyes:      General: No scleral icterus.        Right eye: No discharge.         Left eye: No discharge.      Extraocular Movements: Extraocular movements intact.   Cardiovascular:      Rate and Rhythm: Normal rate and regular rhythm.      Pulses: Normal pulses.       Heart sounds: Normal heart sounds. No murmur heard.    No friction rub. No gallop.   Pulmonary:      Effort: Pulmonary effort is normal. No respiratory distress.      Breath sounds: Normal breath sounds. No stridor.   Abdominal:      General: Abdomen is flat. Bowel sounds are normal. There is no distension.      Palpations: Abdomen is soft. There is no mass.   Musculoskeletal:         General: No swelling or tenderness. Normal range of motion.   Skin:     General: Skin is warm and dry.      Coloration: Skin is not jaundiced or pale.   Neurological:      General: No focal deficit present.      Mental Status: He is alert and oriented to person, place, and time.   Psychiatric:         Mood and Affect: Mood normal.         Behavior: Behavior normal.         LABS  Recent Labs   Lab 03/24/23  0926 11/23/22  1112 11/15/22  0741 11/11/22  0257 11/10/22  1126   WBC 5.9 10.1 10.3   < > 18.6*   HGB 14.3 11.2* 14.1   < > 15.5   HCT 45.4 35.0* 42.3   < > 46.7   MCV 85.8 86.6 83.9   < > 84.3    433 380   < > 334   Absolute Neutrophils 2.6 6.2  --   --  15.5*    < > = values in this interval not displayed.     Recent Labs   Lab 11/23/22  1112 11/15/22  0741 11/13/22  0855 11/12/22  0555 11/11/22  1439 11/11/22  0257   Sodium 137 135 137 135  --  137   Potassium 4.5 4.2 4.7 4.2   < > 3.5   Chloride 102 100 102 100  --  100   BUN 16 17 19 19  --  12   Creatinine 0.79 0.68 0.67 0.66*  --  0.71   Glucose 93 104* 130* 149*  --  138*   Calcium 9.0 8.7 8.7 8.5  --  8.4   Albumin 2.7*  --   --  2.3*  --  2.4*   Globulin 4.6*  --   --  5.1*  --  4.9*   Bilirubin, Total 0.4  --   --  0.6  --  0.6   GOT/AST 28  --   --  19  --  19   Alkaline Phosphatase 134*  --   --  124*  --  129*   GPT 37  --   --  11  --  12    < > = values in this interval not displayed.     Recent Labs   Lab 11/12/22  0555 11/11/22  0257   LD, Total 205 136     Cytology, Fine Needle Aspirate: [id number]  Order: [id number]   Collected 3/27/2023 09:35      Status: Final result     Visible to patient: No (inaccessible in Patient Portal)     0 Result Notes       Component  Resulting Agency   Cytologic Interpretation   Liver, right; FNA, ThinPrep, and core biopsy:   -Satisfactory specimen.  -Positive for malignancy.  -Hepatocellular carcinoma, trabecular type, moderately differentiated.  -See comment.     Comment:   Immunoperoxidase study, performed on block A3 (core biopsy), shows the tumor cells to be positive for arginase, glypican-3, and HepPar 1 while negative for keratin AE1/AE3.  Reticulin stain shows the abnormal reticulin framework surrounding the hepatocellular carcinoma nests.  These findings are consistent with a hepatocellular carcinoma.  The liver, immediately adjacent to the tumors, shows intense lymphocytic portal inflammation with portal fibrosis and expansion of the portal areas with occasional bridging fibrosis, however these changes may be due to the presence of the adjacent tumor masses.  Trichrome stain, and reticulin stain confirm the relative preservation of the hepatic architecture with portal fibrosis and occasional expansion of the portal tracts. PAS and PAS-D show no evidence of alpha-1 antitrypsin deficiency.  Iron stain is negative.  A significant, dense, small lymphocytic infiltrate, is seen expanding the portal areas.  Immunoperoxidase study performed on block A2 (core biopsy), using CD3, CD20, CD5, CD23, Bcl-2, CD10, BCL6, cyclin D1, and Ki-67, shows no evidence of a lymphoproliferative disorder, and is consistent with a reactive inflammatory infiltrate, most likely due to the presence of the adjacent tumor masses.  The biopsies of the benign liver parenchyma adjacent to the tumor nodule shows no convincing evidence of cirrhosis however appropriate clinical evaluation of the liver function is recommended if clinically indicated.  This case is peer-reviewed intradepartmentally.     Results are reported to Dr. Shaver, via [facility],  on 4/4/2023, at 10:53 AM.     List of special procedures:  Immunoperoxidase study:  Block A2: CD3, CD20, CD5, CD23, Bcl-2, BCL6, CD10, cyclin D1, Ki-67.  Block A3 (core biopsy): Arginase, glypican-3, HepPar 1, monoclonal CEA, keratin AE1/AE3.     Special stains:  Block A3 (core biopsy): Reticulin  Block A2: Reticulin, trichrome, PAS, PAS-D, iron.          ASSESSMENT/PLAN  Raj Spencer is a 70 year old male    Hepatocellular carcinoma  -discussed the results of the liver biopsy with the patient.   -I asked him to complete the staging CT chest ASAP to discuss next plans.     Hep C  Hep B      Anemia, unspecified type  - AOCD and iron def       NO voicemail   Ok to call      ORDERS  Orders Placed This Encounter   • Alpha Fetoprotein Tumer Marker   • Comprehensive Metabolic Panel   • Vitamin B12 And Folate   • Iron And total Iron Binding Capacity   • Ferritin       FOLLOW UP  Return in about 3 weeks (around 4/26/2023) for @ [place].    Above plan was discussed with patient and family and all pertinent questions were answered. At the end of the evaluation, the patient was asked if all complaints had been addressed today to his satisfaction and he responded affirmatively.    Thank you for allowing me to participate in your patient's healthcare management. Please feel free to contact me with any questions.    Sincerely,  Maria Antonia Bradford MD    This is a medical document intended as peer to peer communication. It is written in medical language and may contain abbreviations or verbiage that are unfamiliar. It may appear blunt or direct. This note may have been transcribed using a voice dictation system. Voice recognition errors may occur. This should not be taken to alter the content or meaning of this note.

## 2023-04-06 ENCOUNTER — ALLIED HEALTH/NURSE VISIT (OUTPATIENT)
Dept: ALLERGY | Facility: CLINIC | Age: 53
End: 2023-04-06
Payer: COMMERCIAL

## 2023-04-06 DIAGNOSIS — J30.1 SEASONAL ALLERGIC RHINITIS DUE TO POLLEN: Primary | ICD-10-CM

## 2023-04-06 PROCEDURE — 95117 IMMUNOTHERAPY INJECTIONS: CPT

## 2023-04-13 ENCOUNTER — ALLIED HEALTH/NURSE VISIT (OUTPATIENT)
Dept: ALLERGY | Facility: CLINIC | Age: 53
End: 2023-04-13
Payer: COMMERCIAL

## 2023-04-13 DIAGNOSIS — J30.81 ALLERGIC RHINITIS DUE TO ANIMALS: ICD-10-CM

## 2023-04-13 DIAGNOSIS — J30.89 ALLERGIC RHINITIS DUE TO DUST MITE: ICD-10-CM

## 2023-04-13 DIAGNOSIS — J30.1 SEASONAL ALLERGIC RHINITIS DUE TO POLLEN: Primary | ICD-10-CM

## 2023-04-13 DIAGNOSIS — J30.89 ALLERGIC RHINITIS CAUSED BY MOLD: ICD-10-CM

## 2023-04-13 PROCEDURE — 95117 IMMUNOTHERAPY INJECTIONS: CPT

## 2023-04-20 ENCOUNTER — OFFICE VISIT (OUTPATIENT)
Dept: ALLERGY | Facility: CLINIC | Age: 53
End: 2023-04-20
Payer: COMMERCIAL

## 2023-04-20 VITALS — HEART RATE: 82 BPM | RESPIRATION RATE: 16 BRPM | OXYGEN SATURATION: 98 %

## 2023-04-20 DIAGNOSIS — J30.81 ALLERGIC RHINITIS DUE TO ANIMALS: ICD-10-CM

## 2023-04-20 DIAGNOSIS — J45.40 MODERATE PERSISTENT ASTHMA WITHOUT COMPLICATION: ICD-10-CM

## 2023-04-20 DIAGNOSIS — J30.89 ALLERGIC RHINITIS DUE TO DUST MITE: ICD-10-CM

## 2023-04-20 DIAGNOSIS — J30.1 SEASONAL ALLERGIC RHINITIS DUE TO POLLEN: Primary | ICD-10-CM

## 2023-04-20 DIAGNOSIS — J30.89 ALLERGIC RHINITIS CAUSED BY MOLD: ICD-10-CM

## 2023-04-20 PROCEDURE — 99213 OFFICE O/P EST LOW 20 MIN: CPT | Mod: 25 | Performed by: ALLERGY & IMMUNOLOGY

## 2023-04-20 PROCEDURE — 95117 IMMUNOTHERAPY INJECTIONS: CPT | Performed by: ALLERGY & IMMUNOLOGY

## 2023-04-20 RX ORDER — ALBUTEROL SULFATE 90 UG/1
2 AEROSOL, METERED RESPIRATORY (INHALATION) EVERY 6 HOURS PRN
COMMUNITY
End: 2023-11-21

## 2023-04-20 NOTE — PROGRESS NOTES
Lesley Napoles presents to clinic today at the request of Galina Rod MD (ordering provider) for Allergy Immunotherapy injection(s).       This service provided today was under the care of Galina Rod MD; the supervising provider of the day; who was available if needed.      Patient presented after waiting 30 minutes with no reaction to  injections. Discharged from clinic.    Karley Roldan RN

## 2023-04-20 NOTE — PROGRESS NOTES
Subjective   Lesley is a 52 year old, presenting for the following health issues:  Allergy Injection and RECHECK    HPI     Chief complaint: Follow-up allergies     history of present illness: This is a pleasant 52-year-old woman who is here today for follow-up of allergies.  She is currently undergoing allergen immunotherapy In moving into her yellow vial.  No systemic reactions.  Her asthma is well controlled.  Advair 2 puffs twice daily is her controller, 230/21 dose.  She had no need for her albuterol inhaler.  She continues on Pulmicort as well and is soon to meet with pulmonology.            Objective    Pulse 82   Resp 16   SpO2 98%   There is no height or weight on file to calculate BMI.  Physical Exam      Gen: Pleasant female not in acute distress  HEENT: Eyes no erythema of the bulbar or palpebral conjunctiva, no edema. . Nose: No congestion, mucosa normal. Mouth: Throat clear, no lip or tongue edema.     Respiratory: Clear to auscultation bilaterally, no adventitious breath sounds    Skin: No rashes or lesions  Psych: Alert and oriented times 3    Impression report and plan:  1.  Allergic rhinitis  2.  Moderate persistent asthma     Continue allergy shots.  Continue current medication therapy.  Notify of systemic reaction.  Follow in 6 weeks.

## 2023-04-20 NOTE — LETTER
4/20/2023         RE: Lesley Napoles  [address]  [facility] [address]        Dear Colleague,    Thank you for referring your patient, Lesley Napoles, to the [facility]. Please see a copy of my visit note below.    Lesley Napoles presents to clinic today at the request of Galina Rod MD (ordering provider) for Allergy Immunotherapy injection(s).       This service provided today was under the care of Galina Rod MD; the supervising provider of the day; who was available if needed.      Patient presented after waiting 30 minutes with no reaction to  injections. Discharged from clinic.    Karley Roldan RN            Subjective   Lesley is a 52 year old, presenting for the following health issues:  Allergy Injection and RECHECK    HPI     Chief complaint: Follow-up allergies     history of present illness: This is a pleasant 52-year-old woman who is here today for follow-up of allergies.  She is currently undergoing allergen immunotherapy In moving into her yellow vial.  No systemic reactions.  Her asthma is well controlled.  Advair 2 puffs twice daily is her controller, 230/21 dose.  She had no need for her albuterol inhaler.  She continues on Pulmicort as well and is soon to meet with pulmonology.           Objective    Pulse 82   Resp 16   SpO2 98%   There is no height or weight on file to calculate BMI.  Physical Exam     Gen: Pleasant female not in acute distress  HEENT: Eyes no erythema of the bulbar or palpebral conjunctiva, no edema. . Nose: No congestion, mucosa normal. Mouth: Throat clear, no lip or tongue edema.     Respiratory: Clear to auscultation bilaterally, no adventitious breath sounds    Skin: No rashes or lesions  Psych: Alert and oriented times 3    Impression report and plan:  1.  Allergic rhinitis  2.  Moderate persistent asthma     Continue allergy shots.  Continue current medication therapy.  Notify of systemic reaction.  Follow in 6  weeks.              Again, thank you for allowing me to participate in the care of your patient.        Sincerely,        Galina GRACIA MD

## 2023-04-27 ENCOUNTER — ALLIED HEALTH/NURSE VISIT (OUTPATIENT)
Dept: ALLERGY | Facility: CLINIC | Age: 53
End: 2023-04-27
Payer: COMMERCIAL

## 2023-04-27 DIAGNOSIS — J30.89 ALLERGIC RHINITIS DUE TO DUST MITE: Primary | ICD-10-CM

## 2023-04-27 PROCEDURE — 95117 IMMUNOTHERAPY INJECTIONS: CPT

## 2023-05-03 ENCOUNTER — OFFICE VISIT (OUTPATIENT)
Dept: PULMONOLOGY | Facility: CLINIC | Age: 53
End: 2023-05-03
Payer: COMMERCIAL

## 2023-05-03 VITALS
SYSTOLIC BLOOD PRESSURE: 112 MMHG | BODY MASS INDEX: 27.94 KG/M2 | DIASTOLIC BLOOD PRESSURE: 62 MMHG | WEIGHT: 178.4 LBS | OXYGEN SATURATION: 99 % | HEART RATE: 82 BPM

## 2023-05-03 DIAGNOSIS — J45.40 MODERATE PERSISTENT ASTHMA WITHOUT COMPLICATION: Primary | ICD-10-CM

## 2023-05-03 DIAGNOSIS — J32.9 CHRONIC RHINOSINUSITIS: ICD-10-CM

## 2023-05-03 PROCEDURE — 99214 OFFICE O/P EST MOD 30 MIN: CPT | Performed by: INTERNAL MEDICINE

## 2023-05-03 ASSESSMENT — ASTHMA QUESTIONNAIRES
QUESTION_2 LAST FOUR WEEKS HOW OFTEN HAVE YOU HAD SHORTNESS OF BREATH: NOT AT ALL
ACT_TOTALSCORE: 25
ACT_TOTALSCORE: 25
QUESTION_5 LAST FOUR WEEKS HOW WOULD YOU RATE YOUR ASTHMA CONTROL: COMPLETELY CONTROLLED
QUESTION_3 LAST FOUR WEEKS HOW OFTEN DID YOUR ASTHMA SYMPTOMS (WHEEZING, COUGHING, SHORTNESS OF BREATH, CHEST TIGHTNESS OR PAIN) WAKE YOU UP AT NIGHT OR EARLIER THAN USUAL IN THE MORNING: NOT AT ALL
QUESTION_1 LAST FOUR WEEKS HOW MUCH OF THE TIME DID YOUR ASTHMA KEEP YOU FROM GETTING AS MUCH DONE AT WORK, SCHOOL OR AT HOME: NONE OF THE TIME
QUESTION_4 LAST FOUR WEEKS HOW OFTEN HAVE YOU USED YOUR RESCUE INHALER OR NEBULIZER MEDICATION (SUCH AS ALBUTEROL): NOT AT ALL

## 2023-05-03 NOTE — LETTER
5/3/2023         RE: Lesley Napoles  [address]  [place] [address]        Dear Colleague,    Thank you for referring your patient, Lesley Napoles, to the [facility]. Please see a copy of my visit note below.    Pulmonary Clinic Follow-up Visit    Assessment/Plan:  52 year old female never smoker with a history of lifelong asthma, multiple allergies currently undergoing immunotherapy in allergy clinic, hyper-IgE, chronic rhinosinusitis s/p FESS, presenting for follow-up.    Moderate persistent asthma, multiple allergies on immunotherapy, hyper-IgE, chronic rhinosinusitis: It is my first time meeting Lesley, who was previously followed by Dr. Melendez. Lifelong asthma, currently well-controlled with ACT score today 25. She is on duplicate ICS currently for control. Has had migrating ground glass nodular opacities and thickened airways in the past. Possible focal areas of , though unclear. Broadly positive midwest respiratory allergen IgE panel and total IgE 560 kU/L in August 2022. Last exacerbation requiring prednisone was in December 2022. Started using nasal fluticasone with the start of the spring allergy season, not needing nasal ipratropium. Sinus symptoms occur in the forehead, less so maxillary/ethmoid area since her sinus surgery. Takes fexofenadine year-round, and montelukast. Has not needed to use albuterol HFA or nebulized ipratropium-albuterol at all. Cough is improved, minimal during the day, occasional nocturnal cough. No recent wheezing. Was diagnosed with asthma at age 9.    Plan:  - continue fluticasone-salmeterol -21 mcg two inhalations BID; rinse/gargle/spit water after use  - continue budesonide flexhaler 180 mcg two inhalations BID; hope to remove duplicate ICS at some point but will not do it while she is in the midst of immunotherapy  - continue montelukast 10 mg at bedtime  - continue fexofenadine 180 mg daily  - continue nasal fluticasone one  spray in each nostril daily as needed  - also has nasal ipratropium for as-needed use  - albuterol HFA or nebulized ipratropium-albuterol as needed  - stay active with exercise  - recommend remaining up to date with respiratory vaccinations  - follow up in 6 months  - encouraged him to contact us with questions or concerning symptoms    Wil Garcia MD  Pulmonary and Critical Care Medicine  [facility]  Office [phone]  Pager [phone]  he/him    CCx: moderate persistent asthma, multiple allergies on immunotherapy, hyper-IgE, chronic rhinosinusitis    HPI: 52 year old female never smoker with a history of lifelong asthma, multiple allergies currently undergoing immunotherapy in allergy clinic, hyper-IgE, chronic rhinosinusitis s/p FESS, presenting for follow-up. It is my first time meeting Lesley, who was previously followed by Dr. Melendez. Lifelong asthma, currently well-controlled with ACT score today 25. She is on duplicate ICS currently for control. Has had migrating ground glass nodular opacities and thickened airways in the past. Possible focal areas of , though unclear. Broadly positive midwest respiratory allergen IgE panel and total IgE 560 kU/L in August 2022. Last exacerbation requiring prednisone was in December 2022. Started using nasal fluticasone with the start of the spring allergy season, not needing nasal ipratropium. Sinus symptoms occur in the forehead, less so maxillary/ethmoid area since her sinus surgery. Takes fexofenadine year-round, and montelukast. Has not needed to use albuterol HFA or nebulized ipratropium-albuterol at all. Cough is improved, minimal during the day, occasional nocturnal cough. No recent wheezing. Was diagnosed with asthma at age 9.    ROS:  A 12-system review was obtained and was negative with the exception of the symptoms endorsed in the history of present illness.    PMH:  lifelong asthma  multiple allergies currently undergoing immunotherapy  in allergy clinic  hyper-IgE  chronic rhinosinusitis s/p FESS    PSH:  No past surgical history on file.    Allergies:  Allergies   Allergen Reactions     Articaine Swelling       Family HX:  No family history on file.    Social Hx:  Social History     Socioeconomic History     Marital status: Single     Spouse name: Not on file     Number of children: Not on file     Years of education: Not on file     Highest education level: Not on file   Occupational History     Not on file   Tobacco Use     Smoking status: Never     Smokeless tobacco: Never   Vaping Use     Vaping status: Never Used   Substance and Sexual Activity     Alcohol use: Not on file     Drug use: Not on file     Sexual activity: Not on file   Other Topics Concern     Not on file   Social History Narrative     Not on file     Social Determinants of Health     Financial Resource Strain: Not on file   Food Insecurity: Not on file   Transportation Needs: Not on file   Physical Activity: Not on file   Stress: Not on file   Social Connections: Not on file   Intimate Partner Violence: Not on file   Housing Stability: Not on file       Current Meds:  Current Outpatient Medications   Medication Sig Dispense Refill     ADVAIR -21 MCG/ACT inhaler INHALE 2 PUFFS INTO THE LUNGS TWICE A DAY 12 g 4     albuterol (PROAIR HFA/PROVENTIL HFA/VENTOLIN HFA) 108 (90 Base) MCG/ACT inhaler Inhale 2 puffs into the lungs every 6 hours as needed for shortness of breath, wheezing or cough       budesonide (PULMICORT FLEXHALER) 180 MCG/ACT inhaler Inhale 2 puffs into the lungs 2 times daily       citalopram (CELEXA) 40 MG tablet Take 40 mg by mouth daily       EPINEPHrine (ANY BX GENERIC EQUIV) 0.3 MG/0.3ML injection 2-pack Inject into outer thigh for allergic reaction 4 each 0     fexofenadine (ALLEGRA) 180 MG tablet Take 180 mg by mouth daily       fluticasone (FLONASE) 50 MCG/ACT nasal spray SPRAY 2 SPRAYS INTO BOTH NOSTRILS 2 TIMES DAILY 16 mL 1     ipratropium -  albuterol 0.5 mg/2.5 mg/3 mL (DUONEB) 0.5-2.5 (3) MG/3ML neb solution Take 1 vial (3 mLs) by nebulization every 6 hours as needed for shortness of breath / dyspnea or wheezing 420 mL 1     montelukast (SINGULAIR) 10 MG tablet Take 1 tablet (10 mg) by mouth At Bedtime 30 tablet 0     Multiple Vitamin (MULTIVITAMIN ADULT PO) Take 1 tablet by mouth daily       Omega-3 Fatty Acids (FISH OIL) 1200 MG capsule Take 1,200 mg by mouth daily       ORDER FOR ALLERGEN IMMUNOTHERAPY Vaccine A MOLD/ INDOORALLERGENS/ RAGWEED  MM Mold Mix Alternaria, Aspergilus, Cladosporium, Penicillium 1:10 w/v, 1.0 ml  DFM Df Mite D farinae 10,000 AU, 0.5 ml  DPM Dp Mite D pteronyssinus. 10,000 AU, 0.5 ml  DOG AP Dog hair & dander, mixed breeds 1:10 w/v, 0.5 ml  Vaccine B POLLENS/ CAT  G GRASS MIX [place] Blue, Orchard, Redtop, Marco A 100, 000 BAU 0.3 ml  ELM Elm, American Ulmus Americana 1:20 w/v, 0.5 ml  MAP Maple, Hard/Sugar Acer saccharum 1:20 w/v, 0.5 ml  LQ Lamb's Quarters Chenopodium album 1:20 w/v, 0.5 ml  CAT Cat Hair 10,000 BAU 2.0 ml  TRUNG Trung, White Fraxinus Americana 1:20 w/v, 0.5 ml  Dilutions: None (red) Frequency: weekly  1/10 (yellow) Frequency: weekly  1/100 (blue) Frequency: weekly  1/1000 (green) Frequency: weekly      Diluent: HSA qs to 5ml 5 mL 11     rosuvastatin (CRESTOR) 10 MG tablet Take 10 mg by mouth daily       ipratropium (ATROVENT) 0.03 % nasal spray INSTILL 2 SPRAYS INTO BOTH NOSTRILS 3 TIMES DAILY. (Patient not taking: Reported on 4/20/2023) 30 mL 1     predniSONE (DELTASONE) 20 MG tablet Take 1 tablet (20 mg) by mouth 2 times daily 10 tablet 0       Physical Exam:  /62 (BP Location: Left arm, Patient Position: Chair, Cuff Size: Adult Regular)   Pulse 82   Wt 80.9 kg (178 lb 6.4 oz)   SpO2 99%   BMI 27.94 kg/m    Gen: alert, oriented, no distress  HEENT: nasal mucosa shows minimal mucus left naris, no cervical or supraclavicular lymphadenopathy  CV: RRR, no M/G/R  Resp: CTAB, no focal crackles or  wheezes  Skin: no apparent rashes  Ext: no cyanosis, clubbing or edema  Neuro: alert, nonfocal    Labs:  reviewed    Imaging studies:  Chest CT (August 2022):  - images directly reviewed, formal interpretation follows:  FINDINGS:   LUNGS AND PLEURA: Predominant mixed groundglass and consolidative opacity in the left apex 04/21/2022 has resolved. New indistinct focus of groundglass attenuation in the medial posterior aspect of the left upper lobe posterior apical segment consistent   with new focus of airspace inflammation. Similarly there is a new 5 to 6 mm focus of glass attenuation in the medial right apex (series 4, image 46). A 4 x 6 mm groundglass attenuation nodule in the anterior left apex (series 4, image 58) is stable. 3-4   mm solid subpleural nodule in the posterior right upper lobe (series 4, image 70) is also stable. A few punctate benign calcified granuloma are present. A band of atelectasis in the lateral right upper lobe extending to the lateral pleura is stable.     The central airways have a moderate symmetric airway wall thickening. Foci of endoluminal debris are present within segmental and subsegmental airways in the lower lobes, posterior right upper lobe and posterior right middle lobe. The apical and anterior   airways in the upper lobes and have mild cylindrical bronchiectasis but do not have the same degree of airway wall thickening as the posterior/dependent airways. Fat-containing right posterior Bochdalek hernia and adjacent atelectasis. Linear bands of   atelectasis in the posterior left lower lobe and posterior right middle lobe. No pleural effusion is present.     MEDIASTINUM: Cardiac chambers are normal in size. No pericardial effusion. Main pulmonary artery is normal caliber. No thoracic aortic aneurysm. There are no aortic atheromatous calcifications. Wall thickening of the distal esophagus. No esophageal   dilation. There are multiple enlarged hilar and mediastinal lymph nodes.  The largest node is a right lower paratracheal node which measures 13 mm short axis (series 4, image 99) and has a retained fat attenuation hilum.     Heterogeneous attenuation nodule in the inferior lateral right thyroid measures 2.1 x 2.2 cm (series 4, image 21). Normal left lobe of the thyroid.     CORONARY ARTERY CALCIFICATION: Mild.     UPPER ABDOMEN: Cholelithiasis.     MUSCULOSKELETAL: Thoracic vertebra are maintained in height and shape.                                                                      IMPRESSION:      1.  Resolution of the mixed attenuation opacity in the posterior apical left upper lobe present 04/21/2022 consistent with an area of airspace inflammation/infection. New, smaller foci of groundglass inflammation are present posterior medial left upper   lobe and in the medial right apex consistent with inflammatory nodules.  2.  Extensive airway wall thickening which conform to a posterior/dependent distribution including endoluminal material in subsegmental airways in the lower lobes and right middle lobe. The pattern is suspect for bronchopulmonary aspiration.  3.  Multiple enlarged hilar, subcarinal and mediastinal lymph nodes. Presumably reactive in the setting of #2.  4.  Mild wall thickening of the distal esophagus suggesting esophagitis.  5.  Subpleural 4 x 6 mm nodule in the anterior left upper lobe and solid subpleural 3 to 4 mm pulmonary nodule in the posterior right upper lobe have been stable for 4 months favoring benign nodules.  6.  Heterogeneous 2.1 x 2.2 cm right thyroid nodule. If none previous evaluated, thyroid ultrasound is recommended.    Pulmonary Function Testing  May 2022  FVC 3.98 (104%)  FEV1 2.76 (90%)  FEV1/FVC 0.69 (LLN 0.69)  No bronchodilator response  RV 3.17 (168%)  TLC 7.17 (131%)  DLCOc 99%      Again, thank you for allowing me to participate in the care of your patient.        Sincerely,        Wil Garcia MD

## 2023-05-03 NOTE — PATIENT INSTRUCTIONS
It was good to meet you in clinic today. This is what we discussed:    Continue Advair two inhalations twice daily.  Continue Pulmicort two inhalations twice daily.  Rinse, gargle, and spit water after use.  Continue montelukast one pill at bedtime.  Continue fexofenadine (Allegra) one pill daily.  Use the Flonase as needed, and add Atrovent nasal spray if needed.  Use albuterol as needed.  If you have a flare-up, talk to her primary doctor or me and we can start prednisone and antibiotic.  Continue the allergy shots with Dr. Rod.  I will see you in about 6 months.  Contact me with questions or concerns.    Wil Garcia MD  Pulmonary and Critical Care Medicine  [facility]  Office [phone]

## 2023-05-03 NOTE — PROGRESS NOTES
Pulmonary Clinic Follow-up Visit    Assessment/Plan:  52 year old female never smoker with a history of lifelong asthma, multiple allergies currently undergoing immunotherapy in allergy clinic, hyper-IgE, chronic rhinosinusitis s/p FESS, presenting for follow-up.    Moderate persistent asthma, multiple allergies on immunotherapy, hyper-IgE, chronic rhinosinusitis: It is my first time meeting Lesley, who was previously followed by Dr. Melendez. Lifelong asthma, currently well-controlled with ACT score today 25. She is on duplicate ICS currently for control. Has had migrating ground glass nodular opacities and thickened airways in the past. Possible focal areas of , though unclear. Broadly positive midwest respiratory allergen IgE panel and total IgE 560 kU/L in August 2022. Last exacerbation requiring prednisone was in December 2022. Started using nasal fluticasone with the start of the spring allergy season, not needing nasal ipratropium. Sinus symptoms occur in the forehead, less so maxillary/ethmoid area since her sinus surgery. Takes fexofenadine year-round, and montelukast. Has not needed to use albuterol HFA or nebulized ipratropium-albuterol at all. Cough is improved, minimal during the day, occasional nocturnal cough. No recent wheezing. Was diagnosed with asthma at age 9.    Plan:  - continue fluticasone-salmeterol -21 mcg two inhalations BID; rinse/gargle/spit water after use  - continue budesonide flexhaler 180 mcg two inhalations BID; hope to remove duplicate ICS at some point but will not do it while she is in the midst of immunotherapy  - continue montelukast 10 mg at bedtime  - continue fexofenadine 180 mg daily  - continue nasal fluticasone one spray in each nostril daily as needed  - also has nasal ipratropium for as-needed use  - albuterol HFA or nebulized ipratropium-albuterol as needed  - action plan in case of sinus or lower respiratory exacerbation: prednisone 40 mg daily for 7 days and  amoxicillin-clavulanate 875-125 mg BID x 7 days  - stay active with exercise  - recommend remaining up to date with respiratory vaccinations  - follow up in 6 months  - encouraged him to contact us with questions or concerning symptoms    Wil Garcia MD  Pulmonary and Critical Care Medicine  [facility]  Office [phone]  Pager [phone]  he/him    CCx: moderate persistent asthma, multiple allergies on immunotherapy, hyper-IgE, chronic rhinosinusitis    HPI: 52 year old female never smoker with a history of lifelong asthma, multiple allergies currently undergoing immunotherapy in allergy clinic, hyper-IgE, chronic rhinosinusitis s/p FESS, presenting for follow-up. It is my first time meeting Lesley, who was previously followed by Dr. Melendez. Lifelong asthma, currently well-controlled with ACT score today 25. She is on duplicate ICS currently for control. Has had migrating ground glass nodular opacities and thickened airways in the past. Possible focal areas of , though unclear. Broadly positive midwest respiratory allergen IgE panel and total IgE 560 kU/L in August 2022. Last exacerbation requiring prednisone was in December 2022. Started using nasal fluticasone with the start of the spring allergy season, not needing nasal ipratropium. Sinus symptoms occur in the forehead, less so maxillary/ethmoid area since her sinus surgery. Takes fexofenadine year-round, and montelukast. Has not needed to use albuterol HFA or nebulized ipratropium-albuterol at all. Cough is improved, minimal during the day, occasional nocturnal cough. No recent wheezing. Was diagnosed with asthma at age 9.    ROS:  A 12-system review was obtained and was negative with the exception of the symptoms endorsed in the history of present illness.    PMH:  lifelong asthma  multiple allergies currently undergoing immunotherapy in allergy clinic  hyper-IgE  chronic rhinosinusitis s/p FESS    PSH:  No past surgical history on  file.    Allergies:  Allergies   Allergen Reactions     Articaine Swelling       Family HX:  No family history on file.    Social Hx:  Social History     Socioeconomic History     Marital status: Single     Spouse name: Not on file     Number of children: Not on file     Years of education: Not on file     Highest education level: Not on file   Occupational History     Not on file   Tobacco Use     Smoking status: Never     Smokeless tobacco: Never   Vaping Use     Vaping status: Never Used   Substance and Sexual Activity     Alcohol use: Not on file     Drug use: Not on file     Sexual activity: Not on file   Other Topics Concern     Not on file   Social History Narrative     Not on file     Social Determinants of Health     Financial Resource Strain: Not on file   Food Insecurity: Not on file   Transportation Needs: Not on file   Physical Activity: Not on file   Stress: Not on file   Social Connections: Not on file   Intimate Partner Violence: Not on file   Housing Stability: Not on file       Current Meds:  Current Outpatient Medications   Medication Sig Dispense Refill     ADVAIR -21 MCG/ACT inhaler INHALE 2 PUFFS INTO THE LUNGS TWICE A DAY 12 g 4     albuterol (PROAIR HFA/PROVENTIL HFA/VENTOLIN HFA) 108 (90 Base) MCG/ACT inhaler Inhale 2 puffs into the lungs every 6 hours as needed for shortness of breath, wheezing or cough       budesonide (PULMICORT FLEXHALER) 180 MCG/ACT inhaler Inhale 2 puffs into the lungs 2 times daily       citalopram (CELEXA) 40 MG tablet Take 40 mg by mouth daily       EPINEPHrine (ANY BX GENERIC EQUIV) 0.3 MG/0.3ML injection 2-pack Inject into outer thigh for allergic reaction 4 each 0     fexofenadine (ALLEGRA) 180 MG tablet Take 180 mg by mouth daily       fluticasone (FLONASE) 50 MCG/ACT nasal spray SPRAY 2 SPRAYS INTO BOTH NOSTRILS 2 TIMES DAILY 16 mL 1     ipratropium - albuterol 0.5 mg/2.5 mg/3 mL (DUONEB) 0.5-2.5 (3) MG/3ML neb solution Take 1 vial (3 mLs) by  nebulization every 6 hours as needed for shortness of breath / dyspnea or wheezing 420 mL 1     montelukast (SINGULAIR) 10 MG tablet Take 1 tablet (10 mg) by mouth At Bedtime 30 tablet 0     Multiple Vitamin (MULTIVITAMIN ADULT PO) Take 1 tablet by mouth daily       Omega-3 Fatty Acids (FISH OIL) 1200 MG capsule Take 1,200 mg by mouth daily       ORDER FOR ALLERGEN IMMUNOTHERAPY Vaccine A MOLD/ INDOORALLERGENS/ RAGWEED  MM Mold Mix Alternaria, Aspergilus, Cladosporium, Penicillium 1:10 w/v, 1.0 ml  DFM Df Mite D farinae 10,000 AU, 0.5 ml  DPM Dp Mite D pteronyssinus. 10,000 AU, 0.5 ml  DOG AP Dog hair & dander, mixed breeds 1:10 w/v, 0.5 ml  Vaccine B POLLENS/ CAT  G GRASS MIX [place] Blue, Orchard, Redtop, Marco A 100, 000 BAU 0.3 ml  ELM Elm, American Ulmus Americana 1:20 w/v, 0.5 ml  MAP Maple, Hard/Sugar Acer saccharum 1:20 w/v, 0.5 ml  LQ Lamb's Quarters Chenopodium album 1:20 w/v, 0.5 ml  CAT Cat Hair 10,000 BAU 2.0 ml  TRUNG Trung, White Fraxinus Americana 1:20 w/v, 0.5 ml  Dilutions: None (red) Frequency: weekly  1/10 (yellow) Frequency: weekly  1/100 (blue) Frequency: weekly  1/1000 (green) Frequency: weekly      Diluent: HSA qs to 5ml 5 mL 11     rosuvastatin (CRESTOR) 10 MG tablet Take 10 mg by mouth daily       ipratropium (ATROVENT) 0.03 % nasal spray INSTILL 2 SPRAYS INTO BOTH NOSTRILS 3 TIMES DAILY. (Patient not taking: Reported on 4/20/2023) 30 mL 1     predniSONE (DELTASONE) 20 MG tablet Take 1 tablet (20 mg) by mouth 2 times daily 10 tablet 0       Physical Exam:  /62 (BP Location: Left arm, Patient Position: Chair, Cuff Size: Adult Regular)   Pulse 82   Wt 80.9 kg (178 lb 6.4 oz)   SpO2 99%   BMI 27.94 kg/m    Gen: alert, oriented, no distress  HEENT: nasal mucosa shows minimal mucus left naris, no cervical or supraclavicular lymphadenopathy  CV: RRR, no M/G/R  Resp: CTAB, no focal crackles or wheezes  Skin: no apparent rashes  Ext: no cyanosis, clubbing or edema  Neuro: alert,  nonfocal    Labs:  reviewed    Imaging studies:  Chest CT (August 2022):  - images directly reviewed, formal interpretation follows:  FINDINGS:   LUNGS AND PLEURA: Predominant mixed groundglass and consolidative opacity in the left apex 04/21/2022 has resolved. New indistinct focus of groundglass attenuation in the medial posterior aspect of the left upper lobe posterior apical segment consistent   with new focus of airspace inflammation. Similarly there is a new 5 to 6 mm focus of glass attenuation in the medial right apex (series 4, image 46). A 4 x 6 mm groundglass attenuation nodule in the anterior left apex (series 4, image 58) is stable. 3-4   mm solid subpleural nodule in the posterior right upper lobe (series 4, image 70) is also stable. A few punctate benign calcified granuloma are present. A band of atelectasis in the lateral right upper lobe extending to the lateral pleura is stable.     The central airways have a moderate symmetric airway wall thickening. Foci of endoluminal debris are present within segmental and subsegmental airways in the lower lobes, posterior right upper lobe and posterior right middle lobe. The apical and anterior   airways in the upper lobes and have mild cylindrical bronchiectasis but do not have the same degree of airway wall thickening as the posterior/dependent airways. Fat-containing right posterior Bochdalek hernia and adjacent atelectasis. Linear bands of   atelectasis in the posterior left lower lobe and posterior right middle lobe. No pleural effusion is present.     MEDIASTINUM: Cardiac chambers are normal in size. No pericardial effusion. Main pulmonary artery is normal caliber. No thoracic aortic aneurysm. There are no aortic atheromatous calcifications. Wall thickening of the distal esophagus. No esophageal   dilation. There are multiple enlarged hilar and mediastinal lymph nodes. The largest node is a right lower paratracheal node which measures 13 mm short axis  (series 4, image 99) and has a retained fat attenuation hilum.     Heterogeneous attenuation nodule in the inferior lateral right thyroid measures 2.1 x 2.2 cm (series 4, image 21). Normal left lobe of the thyroid.     CORONARY ARTERY CALCIFICATION: Mild.     UPPER ABDOMEN: Cholelithiasis.     MUSCULOSKELETAL: Thoracic vertebra are maintained in height and shape.                                                                      IMPRESSION:      1.  Resolution of the mixed attenuation opacity in the posterior apical left upper lobe present 04/21/2022 consistent with an area of airspace inflammation/infection. New, smaller foci of groundglass inflammation are present posterior medial left upper   lobe and in the medial right apex consistent with inflammatory nodules.  2.  Extensive airway wall thickening which conform to a posterior/dependent distribution including endoluminal material in subsegmental airways in the lower lobes and right middle lobe. The pattern is suspect for bronchopulmonary aspiration.  3.  Multiple enlarged hilar, subcarinal and mediastinal lymph nodes. Presumably reactive in the setting of #2.  4.  Mild wall thickening of the distal esophagus suggesting esophagitis.  5.  Subpleural 4 x 6 mm nodule in the anterior left upper lobe and solid subpleural 3 to 4 mm pulmonary nodule in the posterior right upper lobe have been stable for 4 months favoring benign nodules.  6.  Heterogeneous 2.1 x 2.2 cm right thyroid nodule. If none previous evaluated, thyroid ultrasound is recommended.    Pulmonary Function Testing  May 2022  FVC 3.98 (104%)  FEV1 2.76 (90%)  FEV1/FVC 0.69 (LLN 0.69)  No bronchodilator response  RV 3.17 (168%)  TLC 7.17 (131%)  DLCOc 99%

## 2023-05-04 ENCOUNTER — ALLIED HEALTH/NURSE VISIT (OUTPATIENT)
Dept: ALLERGY | Facility: CLINIC | Age: 53
End: 2023-05-04
Payer: COMMERCIAL

## 2023-05-04 DIAGNOSIS — J30.81 ALLERGIC RHINITIS DUE TO ANIMALS: ICD-10-CM

## 2023-05-04 DIAGNOSIS — J30.89 ALLERGIC RHINITIS DUE TO DUST MITE: Primary | ICD-10-CM

## 2023-05-04 DIAGNOSIS — J30.1 SEASONAL ALLERGIC RHINITIS DUE TO POLLEN: ICD-10-CM

## 2023-05-04 PROCEDURE — 95117 IMMUNOTHERAPY INJECTIONS: CPT

## 2023-05-04 NOTE — PROGRESS NOTES
Lesley Napoles presents to clinic today at the request of Galina Rod MD (ordering provider) for Allergy Immunotherapy injection(s).       This service provided today was under the care of Galina Rod MD; the supervising provider of the day; who was available if needed.      Patient presented after waiting 30 minutes with no reaction to  injections. Discharged from clinic.    Emma Moreland RN

## 2023-05-08 DIAGNOSIS — R09.82 PND (POST-NASAL DRIP): ICD-10-CM

## 2023-05-08 DIAGNOSIS — R05.9 COUGH: ICD-10-CM

## 2023-05-08 RX ORDER — FLUTICASONE PROPIONATE 50 MCG
2 SPRAY, SUSPENSION (ML) NASAL DAILY PRN
Qty: 16 ML | Refills: 4 | Status: SHIPPED | OUTPATIENT
Start: 2023-05-08 | End: 2024-08-29

## 2023-05-11 ENCOUNTER — ALLIED HEALTH/NURSE VISIT (OUTPATIENT)
Dept: ALLERGY | Facility: CLINIC | Age: 53
End: 2023-05-11
Payer: COMMERCIAL

## 2023-05-11 DIAGNOSIS — J30.81 ALLERGIC RHINITIS DUE TO ANIMALS: ICD-10-CM

## 2023-05-11 DIAGNOSIS — J30.89 ALLERGIC RHINITIS DUE TO DUST MITE: Primary | ICD-10-CM

## 2023-05-11 DIAGNOSIS — J30.1 SEASONAL ALLERGIC RHINITIS DUE TO POLLEN: ICD-10-CM

## 2023-05-11 PROCEDURE — 95117 IMMUNOTHERAPY INJECTIONS: CPT

## 2023-05-18 ENCOUNTER — ALLIED HEALTH/NURSE VISIT (OUTPATIENT)
Dept: ALLERGY | Facility: CLINIC | Age: 53
End: 2023-05-18
Payer: COMMERCIAL

## 2023-05-18 DIAGNOSIS — J30.81 ALLERGIC RHINITIS DUE TO ANIMALS: ICD-10-CM

## 2023-05-18 DIAGNOSIS — J30.1 SEASONAL ALLERGIC RHINITIS DUE TO POLLEN: ICD-10-CM

## 2023-05-18 DIAGNOSIS — J30.89 ALLERGIC RHINITIS CAUSED BY MOLD: ICD-10-CM

## 2023-05-18 DIAGNOSIS — J30.89 ALLERGIC RHINITIS DUE TO DUST MITE: Primary | ICD-10-CM

## 2023-05-18 PROCEDURE — 95117 IMMUNOTHERAPY INJECTIONS: CPT

## 2023-05-25 ENCOUNTER — OFFICE VISIT (OUTPATIENT)
Dept: ALLERGY | Facility: CLINIC | Age: 53
End: 2023-05-25
Payer: COMMERCIAL

## 2023-05-25 VITALS — HEART RATE: 76 BPM | OXYGEN SATURATION: 98 % | WEIGHT: 176 LBS | BODY MASS INDEX: 27.57 KG/M2

## 2023-05-25 DIAGNOSIS — J45.40 MODERATE PERSISTENT ASTHMA WITHOUT COMPLICATION: ICD-10-CM

## 2023-05-25 DIAGNOSIS — J30.1 SEASONAL ALLERGIC RHINITIS DUE TO POLLEN: Primary | ICD-10-CM

## 2023-05-25 DIAGNOSIS — J30.81 ALLERGIC RHINITIS DUE TO ANIMALS: ICD-10-CM

## 2023-05-25 DIAGNOSIS — J30.89 ALLERGIC RHINITIS DUE TO DUST MITE: ICD-10-CM

## 2023-05-25 DIAGNOSIS — J30.89 ALLERGIC RHINITIS CAUSED BY MOLD: ICD-10-CM

## 2023-05-25 PROCEDURE — 99213 OFFICE O/P EST LOW 20 MIN: CPT | Performed by: ALLERGY & IMMUNOLOGY

## 2023-05-25 NOTE — PROGRESS NOTES
Subjective   Lesley is a 53 year old, presenting for the following health issues:  No chief complaint on file.    HPI     Chief complaint: Follow-up allergies and asthma    History of present illness: This is a pleasant 53-year-old woman here today for follow-up of allergies and asthma.  Currently undergoing allergen immunotherapy moving into her red vial.  She has experienced some site reactions but no systemic reactions.  Reports her allergy symptoms seem to have improved.  Less nasal congestion and drainage.  She still has a cough but it has improved as well.  She has a history of moderate persistent asthma and currently takes Advair plus Pulmicort inhaler.  She follows with the pulmonary clinic.  Recently saw them and recommended to continue on these inhalers for now while trying to build on allergen immunotherapy.  She continues on a daily Allegra as well as Flonase.  Also uses montelukast regularly.            Objective    There were no vitals taken for this visit.  There is no height or weight on file to calculate BMI.  Physical Exam   Gen: Pleasant female not in acute distress  HEENT: Eyes no erythema of the bulbar or palpebral conjunctiva, no edema.  Nose: No congestion, mucosa normal. Mouth: Throat clear, no lip or tongue edema.     Respiratory: Clear to auscultation bilaterally, no adventitious breath sounds    Skin: No rashes or lesions  Psych: Alert and oriented times 3    Impression report and plan:  1.  Allergic rhinitis  2.  Moderate persistent asthma    Continue allergy shots.  Continue current medication therapy.  Notify of systemic reaction.  Follow in 6 months.  Patient does have peak flows checked prior to each allergy shot administration.

## 2023-05-25 NOTE — LETTER
5/25/2023         RE: Lesley Napoles  [address]  [place] [address]        Dear Colleague,    Thank you for referring your patient, Lesley Napoles, to the [facility]. Please see a copy of my visit note below.          Subjective   Lesley is a 53 year old, presenting for the following health issues:  No chief complaint on file.    HPI     Chief complaint: Follow-up allergies and asthma    History of present illness: This is a pleasant 53-year-old woman here today for follow-up of allergies and asthma.  Currently undergoing allergen immunotherapy moving into her red vial.  She has experienced some site reactions but no systemic reactions.  Reports her allergy symptoms seem to have improved.  Less nasal congestion and drainage.  She still has a cough but it has improved as well.  She has a history of moderate persistent asthma and currently takes Advair plus Pulmicort inhaler.  She follows with the pulmonary clinic.  Recently saw them and recommended to continue on these inhalers for now while trying to build on allergen immunotherapy.  She continues on a daily Allegra as well as Flonase.  Also uses montelukast regularly.           Objective    There were no vitals taken for this visit.  There is no height or weight on file to calculate BMI.  Physical Exam   Gen: Pleasant female not in acute distress  HEENT: Eyes no erythema of the bulbar or palpebral conjunctiva, no edema.  Nose: No congestion, mucosa normal. Mouth: Throat clear, no lip or tongue edema.     Respiratory: Clear to auscultation bilaterally, no adventitious breath sounds    Skin: No rashes or lesions  Psych: Alert and oriented times 3    Impression report and plan:  1.  Allergic rhinitis  2.  Moderate persistent asthma    Continue allergy shots.  Continue current medication therapy.  Notify of systemic reaction.  Follow in 6 months.  Patient does have peak flows checked prior to each allergy shot administration.                Again, thank you for allowing me to participate in the care of your patient.        Sincerely,        Galina GRACIA MD

## 2023-06-01 ENCOUNTER — ALLIED HEALTH/NURSE VISIT (OUTPATIENT)
Dept: ALLERGY | Facility: CLINIC | Age: 53
End: 2023-06-01
Payer: COMMERCIAL

## 2023-06-01 DIAGNOSIS — J30.1 SEASONAL ALLERGIC RHINITIS DUE TO POLLEN: Primary | ICD-10-CM

## 2023-06-01 DIAGNOSIS — J30.89 ALLERGIC RHINITIS CAUSED BY MOLD: ICD-10-CM

## 2023-06-01 PROCEDURE — 95117 IMMUNOTHERAPY INJECTIONS: CPT

## 2023-06-04 ENCOUNTER — HEALTH MAINTENANCE LETTER (OUTPATIENT)
Age: 53
End: 2023-06-04

## 2023-06-06 ENCOUNTER — TELEPHONE (OUTPATIENT)
Dept: ALLERGY | Facility: CLINIC | Age: 53
End: 2023-06-06
Payer: COMMERCIAL

## 2023-06-06 NOTE — TELEPHONE ENCOUNTER
Patient states she has a sinus infection and can she still get her allergy shot if she is on a antibiotic? Please call patient back at phone number file in chart and ok to leave detailed message.

## 2023-06-22 ENCOUNTER — ALLIED HEALTH/NURSE VISIT (OUTPATIENT)
Dept: ALLERGY | Facility: CLINIC | Age: 53
End: 2023-06-22
Payer: COMMERCIAL

## 2023-06-22 DIAGNOSIS — J30.89 ALLERGIC RHINITIS CAUSED BY MOLD: ICD-10-CM

## 2023-06-22 DIAGNOSIS — J30.1 SEASONAL ALLERGIC RHINITIS DUE TO POLLEN: Primary | ICD-10-CM

## 2023-06-22 PROCEDURE — 95117 IMMUNOTHERAPY INJECTIONS: CPT

## 2023-06-29 ENCOUNTER — ALLIED HEALTH/NURSE VISIT (OUTPATIENT)
Dept: ALLERGY | Facility: CLINIC | Age: 53
End: 2023-06-29
Payer: COMMERCIAL

## 2023-06-29 DIAGNOSIS — J30.89 ALLERGIC RHINITIS CAUSED BY MOLD: ICD-10-CM

## 2023-06-29 DIAGNOSIS — J30.1 SEASONAL ALLERGIC RHINITIS DUE TO POLLEN: Primary | ICD-10-CM

## 2023-06-29 DIAGNOSIS — J30.81 ALLERGIC RHINITIS DUE TO ANIMALS: ICD-10-CM

## 2023-06-29 DIAGNOSIS — J30.89 ALLERGIC RHINITIS DUE TO DUST MITE: ICD-10-CM

## 2023-06-29 PROCEDURE — 95117 IMMUNOTHERAPY INJECTIONS: CPT

## 2023-07-06 ENCOUNTER — ALLIED HEALTH/NURSE VISIT (OUTPATIENT)
Dept: ALLERGY | Facility: CLINIC | Age: 53
End: 2023-07-06
Payer: COMMERCIAL

## 2023-07-06 DIAGNOSIS — J30.89 ALLERGIC RHINITIS CAUSED BY MOLD: Primary | ICD-10-CM

## 2023-07-06 PROCEDURE — 95117 IMMUNOTHERAPY INJECTIONS: CPT

## 2023-07-13 ENCOUNTER — ALLIED HEALTH/NURSE VISIT (OUTPATIENT)
Dept: ALLERGY | Facility: CLINIC | Age: 53
End: 2023-07-13
Payer: COMMERCIAL

## 2023-07-13 DIAGNOSIS — J30.81 ALLERGIC RHINITIS DUE TO ANIMALS: ICD-10-CM

## 2023-07-13 DIAGNOSIS — J30.1 SEASONAL ALLERGIC RHINITIS DUE TO POLLEN: ICD-10-CM

## 2023-07-13 DIAGNOSIS — J30.89 ALLERGIC RHINITIS CAUSED BY MOLD: Primary | ICD-10-CM

## 2023-07-13 DIAGNOSIS — J30.89 ALLERGIC RHINITIS DUE TO DUST MITE: ICD-10-CM

## 2023-07-13 PROCEDURE — 95117 IMMUNOTHERAPY INJECTIONS: CPT

## 2023-07-20 ENCOUNTER — ALLIED HEALTH/NURSE VISIT (OUTPATIENT)
Dept: ALLERGY | Facility: CLINIC | Age: 53
End: 2023-07-20
Payer: COMMERCIAL

## 2023-07-20 DIAGNOSIS — J30.81 ALLERGIC RHINITIS DUE TO ANIMALS: ICD-10-CM

## 2023-07-20 DIAGNOSIS — J30.89 ALLERGIC RHINITIS CAUSED BY MOLD: Primary | ICD-10-CM

## 2023-07-20 DIAGNOSIS — J30.89 ALLERGIC RHINITIS DUE TO DUST MITE: ICD-10-CM

## 2023-07-20 DIAGNOSIS — J30.1 SEASONAL ALLERGIC RHINITIS DUE TO POLLEN: ICD-10-CM

## 2023-07-20 PROCEDURE — 95117 IMMUNOTHERAPY INJECTIONS: CPT

## 2023-07-20 NOTE — PROGRESS NOTES
Lesley Napoles presents to clinic today at the request of Galina Rod MD (ordering provider) for Allergy Immunotherapy injection(s).       This service provided today was under the care of Mariposa Mercer MD; the supervising provider of the day; who was available if needed.      Patient presented after waiting 30 minutes with no reaction to  injections. Discharged from clinic.    Emma Moreland RN

## 2023-07-24 DIAGNOSIS — J45.40 MODERATE PERSISTENT ASTHMA WITHOUT COMPLICATION: ICD-10-CM

## 2023-07-24 DIAGNOSIS — R05.9 COUGH: ICD-10-CM

## 2023-07-24 RX ORDER — FLUTICASONE PROPIONATE AND SALMETEROL XINAFOATE 230; 21 UG/1; UG/1
2 AEROSOL, METERED RESPIRATORY (INHALATION) 2 TIMES DAILY
Qty: 12 G | Refills: 2 | Status: SHIPPED | OUTPATIENT
Start: 2023-07-24 | End: 2023-11-21 | Stop reason: ALTCHOICE

## 2023-07-27 ENCOUNTER — ALLIED HEALTH/NURSE VISIT (OUTPATIENT)
Dept: ALLERGY | Facility: CLINIC | Age: 53
End: 2023-07-27
Payer: COMMERCIAL

## 2023-07-27 DIAGNOSIS — J30.89 ALLERGIC RHINITIS CAUSED BY MOLD: Primary | ICD-10-CM

## 2023-07-27 DIAGNOSIS — J30.1 SEASONAL ALLERGIC RHINITIS DUE TO POLLEN: ICD-10-CM

## 2023-07-27 PROCEDURE — 95117 IMMUNOTHERAPY INJECTIONS: CPT

## 2023-07-27 NOTE — PROGRESS NOTES
Lesley Napoles presents to clinic today at the request of Galina Rod MD (ordering provider) for Allergy Immunotherapy injection(s).       This service provided today was under the care of Miguel Avalos MD; the supervising provider of the day; who was available if needed.      Patient presented after waiting 30 minutes with no reaction to  injections. Discharged from clinic.    Emma Moreland RN

## 2023-08-03 ENCOUNTER — ALLIED HEALTH/NURSE VISIT (OUTPATIENT)
Dept: ALLERGY | Facility: CLINIC | Age: 53
End: 2023-08-03
Payer: COMMERCIAL

## 2023-08-03 DIAGNOSIS — J30.89 ALLERGIC RHINITIS CAUSED BY MOLD: Primary | ICD-10-CM

## 2023-08-03 PROCEDURE — 95117 IMMUNOTHERAPY INJECTIONS: CPT

## 2023-08-03 NOTE — PROGRESS NOTES
Lesley Napoles presents to clinic today at the request of Galina Rod MD (ordering provider) for Allergy Immunotherapy injection(s).       This service provided today was under the care of Pratik Servin MD ; the supervising provider of the day; who was available if needed.      Patient presented after waiting 30 minutes with no reaction to  injections. Discharged from clinic.    Dorota Joshi RN

## 2023-08-17 ENCOUNTER — ALLIED HEALTH/NURSE VISIT (OUTPATIENT)
Dept: ALLERGY | Facility: CLINIC | Age: 53
End: 2023-08-17
Payer: COMMERCIAL

## 2023-08-17 DIAGNOSIS — J30.81 ALLERGIC RHINITIS DUE TO ANIMALS: ICD-10-CM

## 2023-08-17 DIAGNOSIS — J30.89 ALLERGIC RHINITIS DUE TO DUST MITE: ICD-10-CM

## 2023-08-17 DIAGNOSIS — J30.1 SEASONAL ALLERGIC RHINITIS DUE TO POLLEN: ICD-10-CM

## 2023-08-17 DIAGNOSIS — J30.89 ALLERGIC RHINITIS CAUSED BY MOLD: Primary | ICD-10-CM

## 2023-08-17 PROCEDURE — 95117 IMMUNOTHERAPY INJECTIONS: CPT

## 2023-08-18 ENCOUNTER — LAB REQUISITION (OUTPATIENT)
Dept: LAB | Facility: CLINIC | Age: 53
End: 2023-08-18

## 2023-08-18 DIAGNOSIS — E78.2 MIXED HYPERLIPIDEMIA: ICD-10-CM

## 2023-08-18 LAB
CHOLEST SERPL-MCNC: 167 MG/DL
HDLC SERPL-MCNC: 45 MG/DL
LDLC SERPL CALC-MCNC: 94 MG/DL
NONHDLC SERPL-MCNC: 122 MG/DL
TRIGL SERPL-MCNC: 140 MG/DL

## 2023-08-18 PROCEDURE — 80061 LIPID PANEL: CPT | Performed by: FAMILY MEDICINE

## 2023-08-31 ENCOUNTER — ALLIED HEALTH/NURSE VISIT (OUTPATIENT)
Dept: ALLERGY | Facility: CLINIC | Age: 53
End: 2023-08-31
Payer: COMMERCIAL

## 2023-08-31 DIAGNOSIS — J30.1 SEASONAL ALLERGIC RHINITIS DUE TO POLLEN: ICD-10-CM

## 2023-08-31 DIAGNOSIS — J30.89 ALLERGIC RHINITIS CAUSED BY MOLD: Primary | ICD-10-CM

## 2023-08-31 PROCEDURE — 95117 IMMUNOTHERAPY INJECTIONS: CPT

## 2023-09-28 ENCOUNTER — ALLIED HEALTH/NURSE VISIT (OUTPATIENT)
Dept: ALLERGY | Facility: CLINIC | Age: 53
End: 2023-09-28
Payer: COMMERCIAL

## 2023-09-28 DIAGNOSIS — J30.89 ALLERGIC RHINITIS CAUSED BY MOLD: Primary | ICD-10-CM

## 2023-09-28 PROCEDURE — 95117 IMMUNOTHERAPY INJECTIONS: CPT

## 2023-10-11 ASSESSMENT — ASTHMA QUESTIONNAIRES: ACT_TOTALSCORE: 24

## 2023-10-14 ENCOUNTER — HEALTH MAINTENANCE LETTER (OUTPATIENT)
Age: 53
End: 2023-10-14

## 2023-10-18 ENCOUNTER — TELEPHONE (OUTPATIENT)
Dept: PULMONOLOGY | Facility: CLINIC | Age: 53
End: 2023-10-18

## 2023-10-18 ENCOUNTER — OFFICE VISIT (OUTPATIENT)
Dept: PULMONOLOGY | Facility: CLINIC | Age: 53
End: 2023-10-18
Payer: COMMERCIAL

## 2023-10-18 VITALS
DIASTOLIC BLOOD PRESSURE: 66 MMHG | WEIGHT: 166 LBS | SYSTOLIC BLOOD PRESSURE: 110 MMHG | OXYGEN SATURATION: 95 % | HEART RATE: 83 BPM | BODY MASS INDEX: 26 KG/M2

## 2023-10-18 DIAGNOSIS — J32.9 CHRONIC RHINOSINUSITIS: ICD-10-CM

## 2023-10-18 DIAGNOSIS — R09.82 PND (POST-NASAL DRIP): ICD-10-CM

## 2023-10-18 DIAGNOSIS — J45.50 SEVERE PERSISTENT ASTHMA WITHOUT COMPLICATION (H): Primary | ICD-10-CM

## 2023-10-18 DIAGNOSIS — R05.3 CHRONIC COUGH: ICD-10-CM

## 2023-10-18 PROCEDURE — 99214 OFFICE O/P EST MOD 30 MIN: CPT | Performed by: NURSE PRACTITIONER

## 2023-10-18 RX ORDER — FLUTICASONE PROPIONATE 110 UG/1
1 AEROSOL, METERED RESPIRATORY (INHALATION) 2 TIMES DAILY
Qty: 12 G | Refills: 11 | Status: SHIPPED | OUTPATIENT
Start: 2023-10-18 | End: 2024-02-29

## 2023-10-18 NOTE — PATIENT INSTRUCTIONS
It was a pleasure to see you in clinic today.   Here is what we discussed:    Stop Advair + Pulmicort, start Trelegy Ellipta one inhalation daily, rinse/gargle after use.  Start Flovent two inhalations twice daily, rinse/gargle after use.  Continue Albuterol inhaler or Duonebs every 4-6 hours as needed for shortness of breath or wheezing.  Call my nurse, Jarrod ([phone]) with any change or worsening of your breathing.  Follow-up in one month.    Ольга Beatty, CNP  Pulmonary Medicine  [facility]  [phone]

## 2023-10-19 ENCOUNTER — TELEPHONE (OUTPATIENT)
Dept: PULMONOLOGY | Facility: CLINIC | Age: 53
End: 2023-10-19
Payer: COMMERCIAL

## 2023-10-19 NOTE — TELEPHONE ENCOUNTER
Prior Authorization Retail Medication Request    Medication/Dose: Trelegy ellipta inhaler    ICD code (if different than what is on RX):  J45.40  Previously Tried and Failed:  pulmicort, difficult patient with mulitiple allergies requiring duplicate ICS therapy   Rationale:  multiple allergies and needs the triple therapy to control her symptoms    Insurance Name:  [employer] [facility]   Insurance ID:  [id number]       Pharmacy Information (if different than what is on RX)  Name:  [facility]  Phone:  [phone]

## 2023-10-19 NOTE — TELEPHONE ENCOUNTER
Central Prior Authorization Team   Phone: [phone]        Prior Authorization Approval    Medication: TRELEGY ELLIPTA 200-62.5-25 MCG/ACT IN AEPB  Authorization Effective Date: 9/19/2023  Authorization Expiration Date: 10/18/2024  Approved Dose/Quantity:   Reference #:     Insurance Company: [employer] - Phone [phone] Fax [phone]  Expected CoPay: $    CoPay Card Available: No    Financial Assistance Needed: N/A  Which Pharmacy is filling the prescription[id number] [place] [address]  Pharmacy Notified: Yes - pharmacy will order in product, but should be in tomorrow  Patient Notified: **Instructed pharmacy to notify patient when script is ready to /ship.**

## 2023-10-19 NOTE — TELEPHONE ENCOUNTER
Central Prior Authorization Team   Phone: [phone]      PA Initiation    Medication: TRELEGY ELLIPTA 200-62.5-25 MCG/ACT IN AEPB  Insurance Company: [email or web address] - Phone [phone] Fax [phone]  Pharmacy Filling the [id number] [place] [facility] [place] [place] - [address]  Filling Pharmacy Phone: [phone]  Filling Pharmacy Fax:    Start Date: 10/19/2023

## 2023-10-26 ENCOUNTER — ALLIED HEALTH/NURSE VISIT (OUTPATIENT)
Dept: ALLERGY | Facility: CLINIC | Age: 53
End: 2023-10-26
Payer: COMMERCIAL

## 2023-10-26 DIAGNOSIS — J30.89 ALLERGIC RHINITIS DUE TO DUST MITE: ICD-10-CM

## 2023-10-26 DIAGNOSIS — J30.81 ALLERGIC RHINITIS DUE TO ANIMALS: ICD-10-CM

## 2023-10-26 DIAGNOSIS — J30.1 SEASONAL ALLERGIC RHINITIS DUE TO POLLEN: ICD-10-CM

## 2023-10-26 DIAGNOSIS — J30.89 ALLERGIC RHINITIS CAUSED BY MOLD: Primary | ICD-10-CM

## 2023-10-26 PROCEDURE — 95165 ANTIGEN THERAPY SERVICES: CPT | Performed by: ALLERGY & IMMUNOLOGY

## 2023-10-26 PROCEDURE — 95117 IMMUNOTHERAPY INJECTIONS: CPT

## 2023-11-09 ENCOUNTER — ALLIED HEALTH/NURSE VISIT (OUTPATIENT)
Dept: ALLERGY | Facility: CLINIC | Age: 53
End: 2023-11-09
Payer: COMMERCIAL

## 2023-11-09 DIAGNOSIS — J30.89 ALLERGIC RHINITIS CAUSED BY MOLD: Primary | ICD-10-CM

## 2023-11-09 PROCEDURE — 95117 IMMUNOTHERAPY INJECTIONS: CPT

## 2023-11-09 NOTE — PROGRESS NOTES
Lesley Napoles presents to clinic today at the request of Galina Rod MD (ordering provider) for Allergy Immunotherapy injection(s).       This service provided today was under the care of Galina Rod MD; the supervising provider of the day; who was available if needed.    Nara Burns MA

## 2023-11-18 ASSESSMENT — ASTHMA QUESTIONNAIRES: ACT_TOTALSCORE: 25

## 2023-11-21 ENCOUNTER — OFFICE VISIT (OUTPATIENT)
Dept: PULMONOLOGY | Facility: CLINIC | Age: 53
End: 2023-11-21
Attending: NURSE PRACTITIONER
Payer: COMMERCIAL

## 2023-11-21 ENCOUNTER — ALLIED HEALTH/NURSE VISIT (OUTPATIENT)
Dept: ALLERGY | Facility: CLINIC | Age: 53
End: 2023-11-21
Payer: COMMERCIAL

## 2023-11-21 VITALS
WEIGHT: 169.2 LBS | DIASTOLIC BLOOD PRESSURE: 77 MMHG | BODY MASS INDEX: 26.5 KG/M2 | SYSTOLIC BLOOD PRESSURE: 112 MMHG | OXYGEN SATURATION: 96 % | HEART RATE: 71 BPM

## 2023-11-21 DIAGNOSIS — J30.89 ALLERGIC RHINITIS CAUSED BY MOLD: Primary | ICD-10-CM

## 2023-11-21 DIAGNOSIS — J45.50 SEVERE PERSISTENT ASTHMA WITHOUT COMPLICATION (H): ICD-10-CM

## 2023-11-21 PROCEDURE — 95117 IMMUNOTHERAPY INJECTIONS: CPT

## 2023-11-21 PROCEDURE — 99213 OFFICE O/P EST LOW 20 MIN: CPT | Performed by: NURSE PRACTITIONER

## 2023-11-21 RX ORDER — ALBUTEROL SULFATE 90 UG/1
2 AEROSOL, METERED RESPIRATORY (INHALATION) EVERY 6 HOURS PRN
Qty: 18 G | Refills: 11 | Status: SHIPPED | OUTPATIENT
Start: 2023-11-21 | End: 2024-08-29

## 2023-11-21 NOTE — PROGRESS NOTES
Pulmonary Clinic Follow-Up          Assessment/Plan:     53 year old female never smoker with a history of lifelong asthma, multiple allergies currently undergoing immunotherapy in allergy clinic, hyper-IgE, chronic rhinosinusitis s/p FESS, presenting for one month follow-up.    Severe persistent asthma  Multiple allergies on immunotherapy  Hyper-IgE  Chronic rhinosinusitis  Ground glass opacities  Lifelong asthma with multiple allergies, followed by Dr Rod in Allergy clinic and currently on immunotherapy monthly.  She has required duplicate ICS for the past 2 years for control. Has had migrating ground glass nodular opacities and thickened airways in the past. Possible focal areas of , though unclear. Broadly positive midwest respiratory allergen IgE panel and total IgE 560 kU/L in August 2022.  Last visit we increased her regimen to Trelegy with extra fluticasone inhaled, due to continued debilitating cough, despite immunotherapy.   Has continued dry cough, but no longer congested, so she is feeling some improvement.  She has upcoming hysterectomy in December.  ACT score 25 today.  Plan:  - continue Trelegy Ellipta (fluticasone/umeclidinium/vilanterol), one inhalation daily, rinse/gargle after use.  - continue extra Flovent (flutcasone) 110, one inhalation twice daily, rinse/gargle after use.  Plan to wean this once she is through surgery.  - continue montelukast 10 mg at bedtime  - continue fexofenadine 180 mg daily  - continue nasal fluticasone one spray in each nostril daily as needed  - also has nasal ipratropium for as-needed use  - albuterol HFA or nebulized ipratropium-albuterol as needed  - action plan in case of sinus or lower respiratory exacerbation: prednisone 40 mg daily for 7 days and amoxicillin-clavulanate 875-125 mg BID x 7 days  - she is UTD with COVID vaccine and annual influenza vaccine.  She is due for prevnar 20.  We discussed this today, she will look into this and decide after  surgery.  - if she does not continue to improve, consider repeat chest CT to evaluate for opacities and/or bronchiectasis that was noted in past, and go from there.    Follow-up  - three months    Ольга Beatty CNP  Pulmonary Medicine  [facility]  [phone]       CC:     Asthma follow-up     HPI:     53 year old female never smoker with a history of lifelong asthma, multiple allergies currently undergoing immunotherapy in allergy clinic, hyper-IgE, chronic rhinosinusitis s/p FESS, presenting for six month follow-up.    Since last visit (10/18/2023), she reports improvement in cough - it is still present but it is dry and not congested.  She has upcoming hysterectomy surgery in December.  No issues with thrush or sore throat/hoarse voice.        5/3/2023     7:00 AM 10/11/2023    10:50 AM 11/18/2023    10:12 AM   ACT Total Scores   ACT TOTAL SCORE (Goal Greater than or Equal to 20) 25 24 25   In the past 12 months, how many times did you visit the emergency room for your asthma without being admitted to the hospital? 0 0 0   In the past 12 months, how many times were you hospitalized overnight because of your asthma? 0 0 0        ROS:     6-point ROS performed and is negative aside from those listed in HPI.     Medical history:       PMH:  lifelong asthma  multiple allergies currently undergoing immunotherapy in allergy clinic  hyper-IgE  chronic rhinosinusitis s/p FESS    PSH:  No past surgical history on file.    Allergies:  Allergies   Allergen Reactions    Articaine Swelling    Sulfamethoxazole-Trimethoprim Diarrhea       Family HX:  No family history on file.    Social Hx:  Social History     Socioeconomic History    Marital status: Single     Spouse name: Not on file    Number of children: Not on file    Years of education: Not on file    Highest education level: Not on file   Occupational History    Not on file   Tobacco Use    Smoking status: Never    Smokeless  tobacco: Never   Vaping Use    Vaping Use: Never used   Substance and Sexual Activity    Alcohol use: Not on file    Drug use: Not on file    Sexual activity: Not on file   Other Topics Concern    Not on file   Social History Narrative    Not on file     Social Determinants of Health     Financial Resource Strain: Not on file   Food Insecurity: Not on file   Transportation Needs: Not on file   Physical Activity: Not on file   Stress: Not on file   Social Connections: Not on file   Interpersonal Safety: Not on file   Housing Stability: Not on file       Current Meds:  Current Outpatient Medications   Medication Sig Dispense Refill    albuterol (PROAIR HFA/PROVENTIL HFA/VENTOLIN HFA) 108 (90 Base) MCG/ACT inhaler Inhale 2 puffs into the lungs every 6 hours as needed for shortness of breath, wheezing or cough 18 g 11    citalopram (CELEXA) 40 MG tablet Take 40 mg by mouth daily      EPINEPHrine (ANY BX GENERIC EQUIV) 0.3 MG/0.3ML injection 2-pack Inject into outer thigh for allergic reaction 4 each 0    fexofenadine (ALLEGRA) 180 MG tablet Take 180 mg by mouth daily      fluticasone (FLONASE) 50 MCG/ACT nasal spray Spray 2 sprays into both nostrils daily as needed for rhinitis or allergies 16 mL 4    fluticasone (FLOVENT HFA) 110 MCG/ACT inhaler Inhale 1 puff into the lungs 2 times daily 12 g 11    Fluticasone-Umeclidin-Vilanterol (TRELEGY ELLIPTA) 200-62.5-25 MCG/ACT oral inhaler Inhale 1 puff into the lungs daily 28 each 11    ipratropium (ATROVENT) 0.03 % nasal spray INSTILL 2 SPRAYS INTO BOTH NOSTRILS 3 TIMES DAILY. 30 mL 1    ipratropium - albuterol 0.5 mg/2.5 mg/3 mL (DUONEB) 0.5-2.5 (3) MG/3ML neb solution Take 1 vial (3 mLs) by nebulization every 6 hours as needed for shortness of breath / dyspnea or wheezing 420 mL 1    montelukast (SINGULAIR) 10 MG tablet Take 1 tablet (10 mg) by mouth At Bedtime 30 tablet 0    Multiple Vitamin (MULTIVITAMIN ADULT PO) Take 1 tablet by mouth daily      Omega-3 Fatty Acids  (FISH OIL) 1200 MG capsule Take 1,200 mg by mouth daily      ORDER FOR ALLERGEN IMMUNOTHERAPY Vaccine A MOLD/ INDOORALLERGENS/ RAGWEED  MM Mold Mix Alternaria, Aspergilus, Cladosporium, Penicillium 1:10 w/v, 1.0 ml  DFM Df Mite D farinae 10,000 AU, 0.5 ml  DPM Dp Mite D pteronyssinus. 10,000 AU, 0.5 ml  DOG AP Dog hair & dander, mixed breeds 1:10 w/v, 0.5 ml  Vaccine B POLLENS/ CAT  G GRASS MIX Kentucky Blue, Orchard, Redtop, Marco A 100, 000 BAU 0.3 ml  ELM Elm, American Ulmus Americana 1:20 w/v, 0.5 ml  MAP Maple, Hard/Sugar Acer saccharum 1:20 w/v, 0.5 ml  LQ Lamb's Quarters Chenopodium album 1:20 w/v, 0.5 ml  CAT Cat Hair 10,000 BAU 2.0 ml  TRUNG Trung, White Fraxinus Americana 1:20 w/v, 0.5 ml  Dilutions: None (red) Frequency: weekly  1/10 (yellow) Frequency: weekly  1/100 (blue) Frequency: weekly  1/1000 (green) Frequency: weekly      Diluent: HSA qs to 5ml 5 mL 11    rosuvastatin (CRESTOR) 10 MG tablet Take 10 mg by mouth daily          Physical Exam:     /77 (BP Location: Right arm, Patient Position: Sitting, Cuff Size: Adult Regular)   Pulse 71   Wt 76.7 kg (169 lb 3.2 oz)   SpO2 96%   BMI 26.50 kg/m    Gen: adult female , appears in NAD  HEENT: clear conjunctivae, moist mucous membranes  CV: RRR, no M/G/R  Resp: rare expiratory wheeze, otherwise CTA.  Respirations even and unlabored.  On RA.  No cough.  Skin: no apparent rashes on visible skin  Ext: no cyanosis, clubbing or edema  Neuro: alert and answering questions appropriately     Data:       Labs:  reviewed    Imaging studies:      Chest CT (August 2022):                                                        IMPRESSION:   1.  Resolution of the mixed attenuation opacity in the posterior apical left upper lobe present 04/21/2022 consistent with an area of airspace inflammation/infection. New, smaller foci of groundglass inflammation are present posterior medial left upper   lobe and in the medial right apex consistent with inflammatory  nodules.  2.  Extensive airway wall thickening which conform to a posterior/dependent distribution including endoluminal material in subsegmental airways in the lower lobes and right middle lobe. The pattern is suspect for bronchopulmonary aspiration.  3.  Multiple enlarged hilar, subcarinal and mediastinal lymph nodes. Presumably reactive in the setting of #2.  4.  Mild wall thickening of the distal esophagus suggesting esophagitis.  5.  Subpleural 4 x 6 mm nodule in the anterior left upper lobe and solid subpleural 3 to 4 mm pulmonary nodule in the posterior right upper lobe have been stable for 4 months favoring benign nodules.  6.  Heterogeneous 2.1 x 2.2 cm right thyroid nodule. If none previous evaluated, thyroid ultrasound is recommended.      Pulmonary Function Testing  May 2022  FVC 3.98 (104%)  FEV1 2.76 (90%)  FEV1/FVC 0.69 (LLN 0.69)  No bronchodilator response  RV 3.17 (168%)  TLC 7.17 (131%)  DLCOc 99%

## 2023-11-21 NOTE — PATIENT INSTRUCTIONS
It was a pleasure to see you in clinic today.   Here is what we discussed:    Continue Trelegy Ellipta one inhalation daily.  Continue the extra Flovent, you can try weaning off of this once you are out of surgery and feel better.  Continue Albuterol every 4-6 hours as needed for shortness of breath or wheezing.  Continue montelukast, fexofenadine, and nasal sprays - as you have been.  Consider receiving the pneumococcal vaccine (prevnar 20).  Call my nurse, Jarrod ([phone]) with any change or worsening of your breathing.  We will order the chest CT if you have worsening or changes.  Follow-up in 3 months.    Ольга Beatty, CNP  Pulmonary Medicine  [facility]  [phone]

## 2023-12-04 ENCOUNTER — LAB REQUISITION (OUTPATIENT)
Dept: LAB | Facility: CLINIC | Age: 53
End: 2023-12-04

## 2023-12-04 DIAGNOSIS — Z01.818 ENCOUNTER FOR OTHER PREPROCEDURAL EXAMINATION: ICD-10-CM

## 2023-12-04 PROCEDURE — 84132 ASSAY OF SERUM POTASSIUM: CPT | Performed by: FAMILY MEDICINE

## 2023-12-05 LAB — POTASSIUM SERPL-SCNC: 3.6 MMOL/L (ref 3.4–5.3)

## 2023-12-21 ENCOUNTER — ALLIED HEALTH/NURSE VISIT (OUTPATIENT)
Dept: ALLERGY | Facility: CLINIC | Age: 53
End: 2023-12-21
Payer: COMMERCIAL

## 2023-12-21 DIAGNOSIS — J30.89 ALLERGIC RHINITIS DUE TO DUST MITE: ICD-10-CM

## 2023-12-21 DIAGNOSIS — J30.89 ALLERGIC RHINITIS CAUSED BY MOLD: Primary | ICD-10-CM

## 2023-12-21 DIAGNOSIS — J30.1 SEASONAL ALLERGIC RHINITIS DUE TO POLLEN: ICD-10-CM

## 2023-12-21 PROCEDURE — 95117 IMMUNOTHERAPY INJECTIONS: CPT

## 2023-12-21 RX ORDER — FLUCONAZOLE 150 MG/1
1 TABLET ORAL DAILY
COMMUNITY
Start: 2023-11-20 | End: 2024-01-18

## 2024-01-18 ENCOUNTER — OFFICE VISIT (OUTPATIENT)
Dept: ALLERGY | Facility: CLINIC | Age: 54
End: 2024-01-18
Payer: COMMERCIAL

## 2024-01-18 VITALS — HEIGHT: 67 IN | HEART RATE: 78 BPM | WEIGHT: 166 LBS | BODY MASS INDEX: 26.06 KG/M2

## 2024-01-18 DIAGNOSIS — J30.1 SEASONAL ALLERGIC RHINITIS DUE TO POLLEN: ICD-10-CM

## 2024-01-18 DIAGNOSIS — J30.89 ALLERGIC RHINITIS DUE TO DUST MITE: ICD-10-CM

## 2024-01-18 DIAGNOSIS — J30.89 ALLERGIC RHINITIS CAUSED BY MOLD: Primary | ICD-10-CM

## 2024-01-18 DIAGNOSIS — J45.40 MODERATE PERSISTENT ASTHMA WITHOUT COMPLICATION: ICD-10-CM

## 2024-01-18 DIAGNOSIS — J30.81 ALLERGIC RHINITIS DUE TO ANIMALS: ICD-10-CM

## 2024-01-18 PROCEDURE — 99213 OFFICE O/P EST LOW 20 MIN: CPT | Mod: 25 | Performed by: ALLERGY & IMMUNOLOGY

## 2024-01-18 PROCEDURE — 95117 IMMUNOTHERAPY INJECTIONS: CPT | Performed by: ALLERGY & IMMUNOLOGY

## 2024-01-18 NOTE — LETTER
"    1/18/2024         RE: Lesley Napoles  [address] [place] [address]        Dear Colleague,    Thank you for referring your patient, Lesley Napoles, to the [facility]. Please see a copy of my visit note below.          Subjective  Lesley is a 53 year old, presenting for the following health issues:  RECHECK (Follow-up allergy shots)    HPI   Chief complaint: Allergies    History of present illness: This is a pleasant 53-year-old woman with a history of allergies and asthma here today for follow-up visit.  She started allergy shots February 2023 and reached maintenance in August.  No systemic reactions.  She is not sure if she is seeing much improvement with her allergies.  She states has been home recently as she had a hysterectomy in December.  With this she has been with her dog more and this causes her to be stuffy.  She continues on a daily Allegra.  She is also been transitioned from Flovent and Pulmicort to Trelegy and with this she has noted less cough.  She is not sure if this is due to allergy shots or due to the change in medication.          Objective   Pulse 78   Ht 1.702 m (5' 7\")   Wt 75.3 kg (166 lb)   BMI 26.00 kg/m    Body mass index is 26 kg/m .    Physical Exam   Gen: Pleasant female not in acute distress  HEENT: Eyes no erythema of the bulbar or palpebral conjunctiva, no edema.  Nose: Congested. Mouth: Throat clear, no lip or tongue edema.     Respiratory: Clear to auscultation bilaterally, no adventitious breath sounds    Psych: Alert and oriented times 3    Impression report and plan:  1.  Moderate persistent asthma  2.  Allergic rhinitis    Testing the patient should take note over the next 1 to 2 months as she should be seen some improvement by now.  I stated can be subtle initially.  However, continue current medication therapy.  Notify of systemic reaction.  If patient continues on allergy shots would like her to follow in 6 months.    Signed " Electronically by: Galina ROD MD      Lesley Napoles presents to clinic today at the request of Galina Rod MD (ordering provider) for Allergy Immunotherapy injection(s).       This service provided today was under the care of Galina Rod MD; the supervising provider of the day; who was available if needed.      Patient presented after waiting 30 minutes with no reaction to  injections. Discharged from clinic.    Dorota Joshi RN      Again, thank you for allowing me to participate in the care of your patient.        Sincerely,        Galina ROD MD

## 2024-01-18 NOTE — PROGRESS NOTES
"      Marj Sutton is a 53 year old, presenting for the following health issues:  RECHECK (Follow-up allergy shots)    HPI   Chief complaint: Allergies    History of present illness: This is a pleasant 53-year-old woman with a history of allergies and asthma here today for follow-up visit.  She started allergy shots February 2023 and reached maintenance in August.  No systemic reactions.  She is not sure if she is seeing much improvement with her allergies.  She states has been home recently as she had a hysterectomy in December.  With this she has been with her dog more and this causes her to be stuffy.  She continues on a daily Allegra.  She is also been transitioned from Flovent and Pulmicort to Trelegy and with this she has noted less cough.  She is not sure if this is due to allergy shots or due to the change in medication.          Objective    Pulse 78   Ht 1.702 m (5' 7\")   Wt 75.3 kg (166 lb)   BMI 26.00 kg/m    Body mass index is 26 kg/m .    Physical Exam   Gen: Pleasant female not in acute distress  HEENT: Eyes no erythema of the bulbar or palpebral conjunctiva, no edema.  Nose: Congested. Mouth: Throat clear, no lip or tongue edema.     Respiratory: Clear to auscultation bilaterally, no adventitious breath sounds    Psych: Alert and oriented times 3    Impression report and plan:  1.  Moderate persistent asthma  2.  Allergic rhinitis    Testing the patient should take note over the next 1 to 2 months as she should be seen some improvement by now.  I stated can be subtle initially.  However, continue current medication therapy.  Notify of systemic reaction.  If patient continues on allergy shots would like her to follow in 6 months.    Signed Electronically by: Galina GRACIA MD    "

## 2024-01-24 ENCOUNTER — LAB REQUISITION (OUTPATIENT)
Dept: LAB | Facility: CLINIC | Age: 54
End: 2024-01-24

## 2024-01-24 DIAGNOSIS — Z01.84 ENCOUNTER FOR ANTIBODY RESPONSE EXAMINATION: ICD-10-CM

## 2024-01-24 PROCEDURE — 86708 HEPATITIS A ANTIBODY: CPT | Performed by: FAMILY MEDICINE

## 2024-01-25 LAB — HAV AB SER QL IA: NONREACTIVE

## 2024-02-29 ENCOUNTER — ALLIED HEALTH/NURSE VISIT (OUTPATIENT)
Dept: ALLERGY | Facility: CLINIC | Age: 54
End: 2024-02-29
Payer: COMMERCIAL

## 2024-02-29 ENCOUNTER — OFFICE VISIT (OUTPATIENT)
Dept: PULMONOLOGY | Facility: CLINIC | Age: 54
End: 2024-02-29
Payer: COMMERCIAL

## 2024-02-29 VITALS
BODY MASS INDEX: 25.69 KG/M2 | HEART RATE: 90 BPM | WEIGHT: 164 LBS | SYSTOLIC BLOOD PRESSURE: 118 MMHG | OXYGEN SATURATION: 97 % | DIASTOLIC BLOOD PRESSURE: 72 MMHG

## 2024-02-29 DIAGNOSIS — J45.50 SEVERE PERSISTENT ASTHMA WITHOUT COMPLICATION (H): Primary | ICD-10-CM

## 2024-02-29 DIAGNOSIS — J30.89 ALLERGIC RHINITIS CAUSED BY MOLD: Primary | ICD-10-CM

## 2024-02-29 PROCEDURE — 99214 OFFICE O/P EST MOD 30 MIN: CPT | Performed by: NURSE PRACTITIONER

## 2024-02-29 RX ORDER — PREDNISONE 20 MG/1
40 TABLET ORAL DAILY
Qty: 14 TABLET | Refills: 0 | Status: SHIPPED | OUTPATIENT
Start: 2024-02-29 | End: 2024-03-07

## 2024-02-29 ASSESSMENT — ASTHMA QUESTIONNAIRES
QUESTION_2 LAST FOUR WEEKS HOW OFTEN HAVE YOU HAD SHORTNESS OF BREATH: NOT AT ALL
QUESTION_1 LAST FOUR WEEKS HOW MUCH OF THE TIME DID YOUR ASTHMA KEEP YOU FROM GETTING AS MUCH DONE AT WORK, SCHOOL OR AT HOME: NONE OF THE TIME
QUESTION_5 LAST FOUR WEEKS HOW WOULD YOU RATE YOUR ASTHMA CONTROL: COMPLETELY CONTROLLED
ACT_TOTALSCORE: 23
ACT_TOTALSCORE: 23
QUESTION_4 LAST FOUR WEEKS HOW OFTEN HAVE YOU USED YOUR RESCUE INHALER OR NEBULIZER MEDICATION (SUCH AS ALBUTEROL): ONCE A WEEK OR LESS
QUESTION_3 LAST FOUR WEEKS HOW OFTEN DID YOUR ASTHMA SYMPTOMS (WHEEZING, COUGHING, SHORTNESS OF BREATH, CHEST TIGHTNESS OR PAIN) WAKE YOU UP AT NIGHT OR EARLIER THAN USUAL IN THE MORNING: ONCE OR TWICE

## 2024-02-29 NOTE — PROGRESS NOTES
Pulmonary Clinic Follow-Up          Assessment/Plan:     53 year old female never smoker with a history of lifelong asthma, multiple allergies currently undergoing immunotherapy in allergy clinic, hyper-IgE, chronic rhinosinusitis s/p FESS, presenting for three month follow-up.    Severe persistent asthma  Environmental allergies on immunotherapy  Hyper-IgE  Chronic rhinosinusitis  Ground glass opacities  Lifelong asthma with multiple allergies, followed by Dr Rod in Allergy clinic and currently on immunotherapy monthly.  She has required duplicate ICS for the past 2 years for control. Has had migrating ground glass nodular opacities and thickened airways in the past. Possible focal areas of , though unclear. Broadly positive midwest respiratory allergen IgE panel and total IgE 560 kU/L in August 2022.  In past she has required extra fluticasone inhaled, due to continued debilitating cough.   She has now weaned herself off this, as well as Singulair.  ACT score 23 today.  She presents for regular follow-up, although her peak flow was reduced in allergy clinic today, and she was unable to receive her injection.  She may have an URI since this weekend.  Diffuse expiratory wheezes during exam.     Plan:  - start prednisone 40mg x7 days for exacerbation.  She will let us know if this does not improve her symptoms, and we can discuss antibiotic or further imaging.  - continue Trelegy Ellipta (fluticasone/umeclidinium/vilanterol) 200/62/5/25, one inhalation daily, rinse/gargle after use.  - continue immunotherapy through Dr Rod, she is going back in two weeks to receive her injection.  - continue fexofenadine 180 mg daily  - continue nasal fluticasone one spray in each nostril daily as needed  - also has nasal ipratropium for as-needed use  - albuterol HFA or nebulized ipratropium-albuterol as needed  - she is UTD with COVID vaccine, annual influenza vaccine, and prevnar 20.   - if any worsening, consider repeat chest  CT to evaluate for opacities and/or bronchiectasis that was noted in past, and go from there.  May need to also restart singulair or extra flovent.  - action plan in case of sinus or lower respiratory exacerbation: prednisone 40 mg daily for 7 days.    Follow-up  - six months    Ольга Beatty CNP  Pulmonary Medicine  [facility]  [phone]       CC:     Asthma follow-up     HPI:     53 year old female never smoker with a history of lifelong asthma, multiple allergies currently undergoing immunotherapy in allergy clinic, hyper-IgE, chronic rhinosinusitis s/p FESS, presenting for three month follow-up.    Went for allergy shot today, peak flows too low (300s, normally 400s)  Breathing has been stable until recently.  Some SOB over the weekend.  Feels 'froggy' in throat.  Off flovent since end of December, no worsening.  Stopped singulair, doing well.  Albuterol one puff over weekend, otherwise had not used in some time.          10/11/2023    10:50 AM 11/18/2023    10:12 AM 2/29/2024     7:12 AM   ACT Total Scores   ACT TOTAL SCORE (Goal Greater than or Equal to 20) 24 25 23   In the past 12 months, how many times did you visit the emergency room for your asthma without being admitted to the hospital? 0 0 0   In the past 12 months, how many times were you hospitalized overnight because of your asthma? 0 0 0        ROS:     6-point ROS performed and is negative aside from those listed in HPI.     Medical history:       PMH:  lifelong asthma  multiple allergies currently undergoing immunotherapy in allergy clinic  hyper-IgE  chronic rhinosinusitis s/p FESS    PSH:  No past surgical history on file.    Allergies:  Allergies   Allergen Reactions    Articaine Swelling    Sulfamethoxazole-Trimethoprim Diarrhea       Family HX:  No family history on file.    Social Hx:  Social History     Socioeconomic History    Marital status: Single     Spouse name: Not on file    Number of  children: Not on file    Years of education: Not on file    Highest education level: Not on file   Occupational History    Not on file   Tobacco Use    Smoking status: Never    Smokeless tobacco: Never   Vaping Use    Vaping Use: Never used   Substance and Sexual Activity    Alcohol use: Not on file    Drug use: Not on file    Sexual activity: Not on file   Other Topics Concern    Not on file   Social History Narrative    Not on file     Social Determinants of Health     Financial Resource Strain: Not on file   Food Insecurity: Not on file   Transportation Needs: Not on file   Physical Activity: Not on file   Stress: Not on file   Social Connections: Not on file   Interpersonal Safety: Not on file   Housing Stability: Not on file       Current Meds:  Current Outpatient Medications   Medication Sig Dispense Refill    albuterol (PROAIR HFA/PROVENTIL HFA/VENTOLIN HFA) 108 (90 Base) MCG/ACT inhaler Inhale 2 puffs into the lungs every 6 hours as needed for shortness of breath, wheezing or cough 18 g 11    citalopram (CELEXA) 40 MG tablet Take 40 mg by mouth daily      fexofenadine (ALLEGRA) 180 MG tablet Take 180 mg by mouth daily      fluticasone (FLONASE) 50 MCG/ACT nasal spray Spray 2 sprays into both nostrils daily as needed for rhinitis or allergies 16 mL 4    Fluticasone-Umeclidin-Vilanterol (TRELEGY ELLIPTA) 200-62.5-25 MCG/ACT oral inhaler Inhale 1 puff into the lungs daily 28 each 11    ORDER FOR ALLERGEN IMMUNOTHERAPY Vaccine A MOLD/ INDOORALLERGENS/ RAGWEED  MM Mold Mix Alternaria, Aspergilus, Cladosporium, Penicillium 1:10 w/v, 1.0 ml  DFM Df Mite D farinae 10,000 AU, 0.5 ml  DPM Dp Mite D pteronyssinus. 10,000 AU, 0.5 ml  DOG AP Dog hair & dander, mixed breeds 1:10 w/v, 0.5 ml  Vaccine B POLLENS/ CAT  G GRASS MIX [place] Blue, Orchard, Redtop, Marco A 100, 000 BAU 0.3 ml  ELM Elm, American Ulmus Americana 1:20 w/v, 0.5 ml  MAP Maple, Hard/Sugar Acer saccharum 1:20 w/v, 0.5 ml  LQ Lamb's Quarters Chenopodium  album 1:20 w/v, 0.5 ml  CAT Cat Hair 10,000 BAU 2.0 ml  TRUNG Trung, White Fraxinus Americana 1:20 w/v, 0.5 ml  Dilutions: None (red) Frequency: weekly  1/10 (yellow) Frequency: weekly  1/100 (blue) Frequency: weekly  1/1000 (green) Frequency: weekly      Diluent: HSA qs to 5ml 5 mL 11    rosuvastatin (CRESTOR) 10 MG tablet Take 10 mg by mouth daily      EPINEPHrine (ANY BX GENERIC EQUIV) 0.3 MG/0.3ML injection 2-pack Inject into outer thigh for allergic reaction (Patient not taking: Reported on 1/18/2024) 4 each 0    fluticasone (FLOVENT HFA) 110 MCG/ACT inhaler Inhale 1 puff into the lungs 2 times daily (Patient not taking: Reported on 1/18/2024) 12 g 11    ipratropium (ATROVENT) 0.03 % nasal spray INSTILL 2 SPRAYS INTO BOTH NOSTRILS 3 TIMES DAILY. (Patient not taking: Reported on 1/18/2024) 30 mL 1    ipratropium - albuterol 0.5 mg/2.5 mg/3 mL (DUONEB) 0.5-2.5 (3) MG/3ML neb solution Take 1 vial (3 mLs) by nebulization every 6 hours as needed for shortness of breath / dyspnea or wheezing (Patient not taking: Reported on 1/18/2024) 420 mL 1    montelukast (SINGULAIR) 10 MG tablet Take 1 tablet (10 mg) by mouth At Bedtime (Patient not taking: Reported on 1/18/2024) 30 tablet 0    Multiple Vitamin (MULTIVITAMIN ADULT PO) Take 1 tablet by mouth daily (Patient not taking: Reported on 1/18/2024)      Omega-3 Fatty Acids (FISH OIL) 1200 MG capsule Take 1,200 mg by mouth daily (Patient not taking: Reported on 1/18/2024)          Physical Exam:     /72 (BP Location: Left arm, Patient Position: Chair, Cuff Size: Adult Regular)   Pulse 90   Wt 74.4 kg (164 lb)   SpO2 97%   BMI 25.69 kg/m    Gen: adult female , appears in NAD  HEENT: clear conjunctivae, moist mucous membranes  CV: RRR, no M/G/R  Resp: expiratory wheezes throughout.  Respirations even and unlabored.  On RA.  No cough.  Skin: no apparent rashes on visible skin  Ext: no cyanosis, clubbing or edema  Neuro: alert and answering questions appropriately      Data:       Labs:  reviewed    Imaging studies:      Chest CT (August 2022):                                                        IMPRESSION:   1.  Resolution of the mixed attenuation opacity in the posterior apical left upper lobe present 04/21/2022 consistent with an area of airspace inflammation/infection. New, smaller foci of groundglass inflammation are present posterior medial left upper   lobe and in the medial right apex consistent with inflammatory nodules.  2.  Extensive airway wall thickening which conform to a posterior/dependent distribution including endoluminal material in subsegmental airways in the lower lobes and right middle lobe. The pattern is suspect for bronchopulmonary aspiration.  3.  Multiple enlarged hilar, subcarinal and mediastinal lymph nodes. Presumably reactive in the setting of #2.  4.  Mild wall thickening of the distal esophagus suggesting esophagitis.  5.  Subpleural 4 x 6 mm nodule in the anterior left upper lobe and solid subpleural 3 to 4 mm pulmonary nodule in the posterior right upper lobe have been stable for 4 months favoring benign nodules.  6.  Heterogeneous 2.1 x 2.2 cm right thyroid nodule. If none previous evaluated, thyroid ultrasound is recommended.      Pulmonary Function Testing  May 2022  FVC 3.98 (104%)  FEV1 2.76 (90%)  FEV1/FVC 0.69 (LLN 0.69)  No bronchodilator response  RV 3.17 (168%)  TLC 7.17 (131%)  DLCOc 99%

## 2024-02-29 NOTE — PATIENT INSTRUCTIONS
It was a pleasure to see you in clinic today.   Here is what we discussed:    Start prednisone 40mg x7 days, let us know if this does not resolve your symptoms.  Continue Trelegy one puff daily, rinse/gargle after use.  Continue Albuterol every 4-6 hours as needed for shortness of breath or wheezing.  Call my nurse, Jarrod ([phone]) with any change or worsening of your breathing.  Follow-up in 6 months.    Ольга Beatty CNP  Pulmonary Medicine  [facility]  [phone]

## 2024-03-12 ENCOUNTER — ALLIED HEALTH/NURSE VISIT (OUTPATIENT)
Dept: ALLERGY | Facility: CLINIC | Age: 54
End: 2024-03-12
Payer: COMMERCIAL

## 2024-03-12 DIAGNOSIS — J30.1 SEASONAL ALLERGIC RHINITIS DUE TO POLLEN: Primary | ICD-10-CM

## 2024-03-12 PROCEDURE — 95117 IMMUNOTHERAPY INJECTIONS: CPT

## 2024-04-11 ENCOUNTER — ALLIED HEALTH/NURSE VISIT (OUTPATIENT)
Dept: ALLERGY | Facility: CLINIC | Age: 54
End: 2024-04-11
Payer: COMMERCIAL

## 2024-04-11 DIAGNOSIS — J30.1 SEASONAL ALLERGIC RHINITIS DUE TO POLLEN: Primary | ICD-10-CM

## 2024-04-11 PROCEDURE — 95117 IMMUNOTHERAPY INJECTIONS: CPT

## 2024-08-22 ENCOUNTER — LAB REQUISITION (OUTPATIENT)
Dept: LAB | Facility: CLINIC | Age: 54
End: 2024-08-22

## 2024-08-22 DIAGNOSIS — E78.2 MIXED HYPERLIPIDEMIA: ICD-10-CM

## 2024-08-22 LAB
CHOLEST SERPL-MCNC: 200 MG/DL
FASTING STATUS PATIENT QL REPORTED: ABNORMAL
HDLC SERPL-MCNC: 55 MG/DL
LDLC SERPL CALC-MCNC: 124 MG/DL
NONHDLC SERPL-MCNC: 145 MG/DL
TRIGL SERPL-MCNC: 107 MG/DL

## 2024-08-22 PROCEDURE — 80061 LIPID PANEL: CPT | Performed by: FAMILY MEDICINE

## 2024-08-24 ASSESSMENT — ASTHMA QUESTIONNAIRES
QUESTION_2 LAST FOUR WEEKS HOW OFTEN HAVE YOU HAD SHORTNESS OF BREATH: ONCE OR TWICE A WEEK
ACT_TOTALSCORE: 21
QUESTION_1 LAST FOUR WEEKS HOW MUCH OF THE TIME DID YOUR ASTHMA KEEP YOU FROM GETTING AS MUCH DONE AT WORK, SCHOOL OR AT HOME: NONE OF THE TIME
QUESTION_4 LAST FOUR WEEKS HOW OFTEN HAVE YOU USED YOUR RESCUE INHALER OR NEBULIZER MEDICATION (SUCH AS ALBUTEROL): ONCE A WEEK OR LESS
QUESTION_3 LAST FOUR WEEKS HOW OFTEN DID YOUR ASTHMA SYMPTOMS (WHEEZING, COUGHING, SHORTNESS OF BREATH, CHEST TIGHTNESS OR PAIN) WAKE YOU UP AT NIGHT OR EARLIER THAN USUAL IN THE MORNING: ONCE OR TWICE
ACT_TOTALSCORE: 21
QUESTION_5 LAST FOUR WEEKS HOW WOULD YOU RATE YOUR ASTHMA CONTROL: WELL CONTROLLED

## 2024-08-29 ENCOUNTER — LAB (OUTPATIENT)
Dept: LAB | Facility: HOSPITAL | Age: 54
End: 2024-08-29
Payer: COMMERCIAL

## 2024-08-29 ENCOUNTER — OFFICE VISIT (OUTPATIENT)
Dept: PULMONOLOGY | Facility: CLINIC | Age: 54
End: 2024-08-29
Attending: NURSE PRACTITIONER
Payer: COMMERCIAL

## 2024-08-29 VITALS
BODY MASS INDEX: 26.34 KG/M2 | DIASTOLIC BLOOD PRESSURE: 68 MMHG | WEIGHT: 168.2 LBS | HEART RATE: 82 BPM | SYSTOLIC BLOOD PRESSURE: 114 MMHG | OXYGEN SATURATION: 94 %

## 2024-08-29 DIAGNOSIS — R05.3 CHRONIC COUGH: ICD-10-CM

## 2024-08-29 DIAGNOSIS — J45.50 SEVERE PERSISTENT ASTHMA WITHOUT COMPLICATION (H): ICD-10-CM

## 2024-08-29 DIAGNOSIS — Z91.09 ENVIRONMENTAL ALLERGIES: ICD-10-CM

## 2024-08-29 DIAGNOSIS — J32.9 CHRONIC RHINOSINUSITIS: ICD-10-CM

## 2024-08-29 DIAGNOSIS — R09.82 PND (POST-NASAL DRIP): ICD-10-CM

## 2024-08-29 DIAGNOSIS — R91.8 OPACITY OF LUNG ON IMAGING STUDY: ICD-10-CM

## 2024-08-29 DIAGNOSIS — J45.50 SEVERE PERSISTENT ASTHMA WITHOUT COMPLICATION (H): Primary | ICD-10-CM

## 2024-08-29 LAB
BASOPHILS # BLD AUTO: 0.1 10E3/UL (ref 0–0.2)
BASOPHILS NFR BLD AUTO: 1 %
EOSINOPHIL # BLD AUTO: 0.5 10E3/UL (ref 0–0.7)
EOSINOPHIL NFR BLD AUTO: 8 %
ERYTHROCYTE [DISTWIDTH] IN BLOOD BY AUTOMATED COUNT: 13.8 % (ref 10–15)
HCT VFR BLD AUTO: 41.7 % (ref 35–47)
HGB BLD-MCNC: 13.2 G/DL (ref 11.7–15.7)
IMM GRANULOCYTES # BLD: 0 10E3/UL
IMM GRANULOCYTES NFR BLD: 0 %
LYMPHOCYTES # BLD AUTO: 1.5 10E3/UL (ref 0.8–5.3)
LYMPHOCYTES NFR BLD AUTO: 22 %
MCH RBC QN AUTO: 28.4 PG (ref 26.5–33)
MCHC RBC AUTO-ENTMCNC: 31.7 G/DL (ref 31.5–36.5)
MCV RBC AUTO: 90 FL (ref 78–100)
MONOCYTES # BLD AUTO: 0.5 10E3/UL (ref 0–1.3)
MONOCYTES NFR BLD AUTO: 8 %
NEUTROPHILS # BLD AUTO: 4.3 10E3/UL (ref 1.6–8.3)
NEUTROPHILS NFR BLD AUTO: 61 %
NRBC # BLD AUTO: 0 10E3/UL
NRBC BLD AUTO-RTO: 0 /100
PLATELET # BLD AUTO: 232 10E3/UL (ref 150–450)
RBC # BLD AUTO: 4.65 10E6/UL (ref 3.8–5.2)
WBC # BLD AUTO: 6.9 10E3/UL (ref 4–11)

## 2024-08-29 PROCEDURE — 95012 NITRIC OXIDE EXP GAS DETER: CPT | Performed by: NURSE PRACTITIONER

## 2024-08-29 PROCEDURE — 85041 AUTOMATED RBC COUNT: CPT

## 2024-08-29 PROCEDURE — 99214 OFFICE O/P EST MOD 30 MIN: CPT | Mod: 25 | Performed by: NURSE PRACTITIONER

## 2024-08-29 PROCEDURE — 36415 COLL VENOUS BLD VENIPUNCTURE: CPT

## 2024-08-29 PROCEDURE — 82785 ASSAY OF IGE: CPT

## 2024-08-29 RX ORDER — ALBUTEROL SULFATE 90 UG/1
2 AEROSOL, METERED RESPIRATORY (INHALATION) EVERY 6 HOURS PRN
Qty: 18 G | Refills: 11 | Status: SHIPPED | OUTPATIENT
Start: 2024-08-29

## 2024-08-29 RX ORDER — FLUTICASONE PROPIONATE 50 MCG
2 SPRAY, SUSPENSION (ML) NASAL DAILY PRN
Qty: 18.2 ML | Refills: 4 | Status: SHIPPED | OUTPATIENT
Start: 2024-08-29

## 2024-08-29 RX ORDER — FEXOFENADINE HCL 180 MG/1
180 TABLET ORAL DAILY
Qty: 90 TABLET | Refills: 5 | Status: SHIPPED | OUTPATIENT
Start: 2024-08-29

## 2024-08-29 RX ORDER — AZELASTINE 1 MG/ML
1 SPRAY, METERED NASAL AT BEDTIME
Qty: 30 ML | Refills: 11 | Status: SHIPPED | OUTPATIENT
Start: 2024-08-29

## 2024-08-29 NOTE — PROGRESS NOTES
Pulmonary Clinic Follow-Up          Assessment/Plan:     54 year old female with a history of lifelong asthma, multiple allergies currently undergoing immunotherapy in allergy clinic, hyper-IgE, chronic rhinosinusitis s/p FESS, presenting for 6 month follow-up.    Severe persistent asthma  Environmental allergies on immunotherapy  Hyper-IgE  Chronic rhinosinusitis  Ground glass opacities  Lifelong asthma with multiple allergies, previously followed by Dr Rod in Allergy clinic and received immunotherapy x1.5 years.  She has required duplicate ICS, although she noted no benefit from cough.  Sinus surgery through [facility] in past.  Has had migrating ground glass nodular opacities and thickened airways. Possible focal areas of , though unclear.  Esophagitis also noted on this CT scan in 8/2022.  VSS without aspiration.  Broadly positive midwest respiratory allergen IgE panel and total IgE 560 kU/L in August 2022. She has stopped Singulair, no noted worsening - she is unsure if this was ever beneficial.  She is no longer receiving immunotherapy, she did not find benefit.  She continues to cough.  Of note, she is clearing her throat and sniffling throughout exam.  FeNO: 21    Plan:  - chest CT to evaluate for opacities.   - repeat blood work, start work-up for Biologics.  - esophagram, concerns for aspiration and esophagitis on past CT.  May need GI referral for EGD.   - start saline spray BID.  - start Azelastine at bedtime, can increase to BID if not too drying.  - referral back to [facility], I believe her chronic rhinosinusitis is worsening her asthma control.   - continue Trelegy Ellipta (fluticasone/umeclidinium/vilanterol) 200/62/5/25, one inhalation daily, rinse/gargle after use.  - continue fexofenadine 180 mg daily  - albuterol HFA or nebulized ipratropium-albuterol as needed  - she is UTD with COVID vaccine, annual influenza vaccine, and prevnar 20.   - action plan in case of sinus or lower respiratory  exacerbation: prednisone 40 mg daily for 7 days.    Follow-up  -2 months, after above work-up is done    Ольга Beatty CNP  Pulmonary Medicine  [facility]  [phone]       CC:     Asthma follow-up     HPI:     54 year old female with a history of lifelong asthma, multiple allergies currently undergoing immunotherapy in allergy clinic, hyper-IgE, chronic rhinosinusitis s/p FESS, presenting for 6 month follow-up.    Was at PCP last week, not feeling well, still coughing.  No benefit from immunotherapy.  No benefit from singulair for years, off since April, no worsening/change.  She does not feel controlled.  Previous sinus surgery through ENT, has not been back in years.          11/18/2023    10:12 AM 2/29/2024     7:12 AM 8/24/2024    12:12 PM   ACT Total Scores   ACT TOTAL SCORE (Goal Greater than or Equal to 20) 25 23 21   In the past 12 months, how many times did you visit the emergency room for your asthma without being admitted to the hospital? 0 0 0   In the past 12 months, how many times were you hospitalized overnight because of your asthma? 0 0 0        ROS:     6-point ROS performed and is negative aside from those listed in HPI.     Medical history:       PMH:  lifelong asthma  multiple allergies currently undergoing immunotherapy in allergy clinic  hyper-IgE  chronic rhinosinusitis s/p FESS    PSH:  No past surgical history on file.    Allergies:  Allergies   Allergen Reactions    Articaine Swelling    Sulfamethoxazole-Trimethoprim Diarrhea       Family HX:  No family history on file.    Social Hx:  Social History     Socioeconomic History    Marital status: Single     Spouse name: Not on file    Number of children: Not on file    Years of education: Not on file    Highest education level: Not on file   Occupational History    Not on file   Tobacco Use    Smoking status: Never    Smokeless tobacco: Never   Vaping Use    Vaping status: Never Used   Substance and  Sexual Activity    Alcohol use: Not on file    Drug use: Not on file    Sexual activity: Not on file   Other Topics Concern    Not on file   Social History Narrative    Not on file     Social Determinants of Health     Financial Resource Strain: Not on file   Food Insecurity: Not on file   Transportation Needs: Not on file   Physical Activity: Not on file   Stress: Not on file   Social Connections: Unknown (12/11/2023)    Received [facility]    Social Connections     Frequency of Communication with Friends and Family: Not on file   Interpersonal Safety: Not on file   Housing Stability: Not on file       Current Meds:  Current Outpatient Medications   Medication Sig Dispense Refill    albuterol (PROAIR HFA/PROVENTIL HFA/VENTOLIN HFA) 108 (90 Base) MCG/ACT inhaler Inhale 2 puffs into the lungs every 6 hours as needed for shortness of breath, wheezing or cough. 18 g 11    azelastine (ASTELIN) 0.1 % nasal spray Spray 1 spray into both nostrils at bedtime. 30 mL 11    citalopram (CELEXA) 40 MG tablet Take 40 mg by mouth daily      EPINEPHrine (ANY BX GENERIC EQUIV) 0.3 MG/0.3ML injection 2-pack Inject into outer thigh for allergic reaction 4 each 0    fexofenadine (ALLEGRA) 180 MG tablet Take 1 tablet (180 mg) by mouth daily. 90 tablet 5    fluticasone (FLONASE) 50 MCG/ACT nasal spray Spray 2 sprays into both nostrils daily as needed for rhinitis or allergies. 18.2 mL 4    Fluticasone-Umeclidin-Vilanterol (TRELEGY ELLIPTA) 200-62.5-25 MCG/ACT oral inhaler Inhale 1 puff into the lungs daily. 28 each 11    ipratropium (ATROVENT) 0.03 % nasal spray INSTILL 2 SPRAYS INTO BOTH NOSTRILS 3 TIMES DAILY. 30 mL 1    ipratropium - albuterol 0.5 mg/2.5 mg/3 mL (DUONEB) 0.5-2.5 (3) MG/3ML neb solution Take 1 vial (3 mLs) by nebulization every 6 hours as needed for shortness of breath / dyspnea or wheezing 420 mL 1    Multiple Vitamin (MULTIVITAMIN  ADULT PO) Take 1 tablet by mouth daily.      Omega-3 Fatty Acids (FISH OIL) 1200 MG capsule Take 1,200 mg by mouth daily.      ORDER FOR ALLERGEN IMMUNOTHERAPY Vaccine A MOLD/ INDOORALLERGENS/ RAGWEED  MM Mold Mix Alternaria, Aspergilus, Cladosporium, Penicillium 1:10 w/v, 1.0 ml  DFM Df Mite D farinae 10,000 AU, 0.5 ml  DPM Dp Mite D pteronyssinus. 10,000 AU, 0.5 ml  DOG AP Dog hair & dander, mixed breeds 1:10 w/v, 0.5 ml  Vaccine B POLLENS/ CAT  G GRASS MIX [place] Blue, Orchard, Redtop, Marco A 100, 000 BAU 0.3 ml  ELM Elm, American Ulmus Americana 1:20 w/v, 0.5 ml  MAP Maple, Hard/Sugar Acer saccharum 1:20 w/v, 0.5 ml  LQ Lamb's Quarters Chenopodium album 1:20 w/v, 0.5 ml  CAT Cat Hair 10,000 BAU 2.0 ml  TRUNG Trung, White Fraxinus Americana 1:20 w/v, 0.5 ml  Dilutions: None (red) Frequency: weekly  1/10 (yellow) Frequency: weekly  1/100 (blue) Frequency: weekly  1/1000 (green) Frequency: weekly      Diluent: HSA qs to 5ml 5 mL 11    rosuvastatin (CRESTOR) 10 MG tablet Take 10 mg by mouth daily          Physical Exam:     /68 (BP Location: Right arm, Patient Position: Sitting, Cuff Size: Adult Regular)   Pulse 82   Wt 76.3 kg (168 lb 3.2 oz)   SpO2 94%   BMI 26.34 kg/m    Gen: adult female, appears in NAD  HEENT: clear conjunctivae, moist mucous membranes.  Clearing throat, sniffling.  CV: RRR, no M/G/R  Resp: rare expiratory wheezes. Respirations even and unlabored.  On RA.  No cough.  Skin: no apparent rashes on visible skin  Ext: no cyanosis, clubbing or edema  Neuro: alert and answering questions appropriately     Data:       Labs:  reviewed    Imaging studies:      Chest CT (August 2022):                                                        IMPRESSION:   1.  Resolution of the mixed attenuation opacity in the posterior apical left upper lobe present 04/21/2022 consistent with an area of airspace inflammation/infection. New, smaller foci of groundglass inflammation are present posterior medial left  upper   lobe and in the medial right apex consistent with inflammatory nodules.  2.  Extensive airway wall thickening which conform to a posterior/dependent distribution including endoluminal material in subsegmental airways in the lower lobes and right middle lobe. The pattern is suspect for bronchopulmonary aspiration.  3.  Multiple enlarged hilar, subcarinal and mediastinal lymph nodes. Presumably reactive in the setting of #2.  4.  Mild wall thickening of the distal esophagus suggesting esophagitis.  5.  Subpleural 4 x 6 mm nodule in the anterior left upper lobe and solid subpleural 3 to 4 mm pulmonary nodule in the posterior right upper lobe have been stable for 4 months favoring benign nodules.  6.  Heterogeneous 2.1 x 2.2 cm right thyroid nodule. If none previous evaluated, thyroid ultrasound is recommended.      Pulmonary Function Testing  May 2022  FVC 3.98 (104%)  FEV1 2.76 (90%)  FEV1/FVC 0.69 (LLN 0.69)  No bronchodilator response  RV 3.17 (168%)  TLC 7.17 (131%)  DLCOc 99%

## 2024-08-29 NOTE — PATIENT INSTRUCTIONS
It was a pleasure to see you in clinic today.   Here is what we discussed:    Have the chest CT scan done.  Have the blood work drawn.  Have the esophagram done.  Start saline spray twice daily, blow nose afterwards.  Start Azelastine nasal spray at bedtime, can increase to twice daily if not too drying.  I have referred you to [facility].  Continue Trelegy Ellipta one puff daily, rinse/gargle after use.   Continue Albuterol every 4-6 hours as needed for shortness of breath or wheezing.  Continue Allegra 180mg daily.  Call my nurse, Jarrod ([phone]) with any change or worsening of your breathing.  Follow-up in two months.     Ольга Beatty, CNP  Pulmonary Medicine  [facility]  [phone]

## 2024-08-30 LAB — IGE SERPL-ACNC: 1088 KU/L (ref 0–114)

## 2024-09-04 ENCOUNTER — HOSPITAL ENCOUNTER (OUTPATIENT)
Dept: RADIOLOGY | Facility: HOSPITAL | Age: 54
Discharge: HOME | End: 2024-09-04
Attending: NURSE PRACTITIONER | Admitting: NURSE PRACTITIONER
Payer: COMMERCIAL

## 2024-09-04 DIAGNOSIS — J32.9 CHRONIC RHINOSINUSITIS: ICD-10-CM

## 2024-09-04 DIAGNOSIS — J45.50 SEVERE PERSISTENT ASTHMA WITHOUT COMPLICATION (H): ICD-10-CM

## 2024-09-04 DIAGNOSIS — R91.8 OPACITY OF LUNG ON IMAGING STUDY: ICD-10-CM

## 2024-09-04 DIAGNOSIS — R05.3 CHRONIC COUGH: ICD-10-CM

## 2024-09-04 PROCEDURE — 74221 X-RAY XM ESOPHAGUS 2CNTRST: CPT

## 2024-09-04 PROCEDURE — 250N000013 HC RX MED GY IP 250 OP 250 PS 637: Performed by: NURSE PRACTITIONER

## 2024-09-04 RX ADMIN — ANTACID/ANTIFLATULENT 4 G: 380; 550; 10; 10 GRANULE, EFFERVESCENT ORAL at 08:45

## 2024-09-12 ENCOUNTER — ANCILLARY PROCEDURE (OUTPATIENT)
Dept: CT IMAGING | Facility: CLINIC | Age: 54
End: 2024-09-12
Attending: NURSE PRACTITIONER
Payer: COMMERCIAL

## 2024-09-12 DIAGNOSIS — J45.50 SEVERE PERSISTENT ASTHMA WITHOUT COMPLICATION (H): ICD-10-CM

## 2024-09-12 DIAGNOSIS — J32.9 CHRONIC RHINOSINUSITIS: ICD-10-CM

## 2024-09-12 DIAGNOSIS — Z91.09 ENVIRONMENTAL ALLERGIES: ICD-10-CM

## 2024-09-12 PROCEDURE — 71250 CT THORAX DX C-: CPT | Mod: TC | Performed by: STUDENT IN AN ORGANIZED HEALTH CARE EDUCATION/TRAINING PROGRAM

## 2024-09-16 DIAGNOSIS — R91.8 OPACITY OF LUNG ON IMAGING STUDY: Primary | ICD-10-CM

## 2024-10-22 ENCOUNTER — TRANSFERRED RECORDS (OUTPATIENT)
Dept: HEALTH INFORMATION MANAGEMENT | Facility: CLINIC | Age: 54
End: 2024-10-22

## 2024-10-22 PROCEDURE — 82784 ASSAY IGA/IGD/IGG/IGM EACH: CPT | Mod: ORL | Performed by: STUDENT IN AN ORGANIZED HEALTH CARE EDUCATION/TRAINING PROGRAM

## 2024-10-22 PROCEDURE — 83516 IMMUNOASSAY NONANTIBODY: CPT | Mod: ORL | Performed by: STUDENT IN AN ORGANIZED HEALTH CARE EDUCATION/TRAINING PROGRAM

## 2024-10-22 PROCEDURE — 86317 IMMUNOASSAY INFECTIOUS AGENT: CPT | Mod: ORL | Performed by: STUDENT IN AN ORGANIZED HEALTH CARE EDUCATION/TRAINING PROGRAM

## 2024-10-23 ENCOUNTER — LAB REQUISITION (OUTPATIENT)
Dept: LAB | Facility: CLINIC | Age: 54
End: 2024-10-23
Payer: COMMERCIAL

## 2024-10-23 DIAGNOSIS — J47.9 BRONCHIECTASIS, UNCOMPLICATED (H): ICD-10-CM

## 2024-10-24 LAB
IGA SERPL-MCNC: 187 MG/DL (ref 84–499)
IGG SERPL-MCNC: 1070 MG/DL (ref 610–1616)
IGM SERPL-MCNC: 152 MG/DL (ref 35–242)

## 2024-10-25 LAB
A FUMIGATUS IGG SER-MCNC: 27.6 MCG/ML
MYELOPEROXIDASE AB SER IA-ACNC: 17 U/ML
MYELOPEROXIDASE AB SER IA-ACNC: POSITIVE
PROTEINASE3 AB SER IA-ACNC: <1 U/ML
PROTEINASE3 AB SER IA-ACNC: NEGATIVE

## 2024-10-29 ENCOUNTER — OFFICE VISIT (OUTPATIENT)
Dept: PULMONOLOGY | Facility: CLINIC | Age: 54
End: 2024-10-29
Attending: NURSE PRACTITIONER
Payer: COMMERCIAL

## 2024-10-29 ENCOUNTER — TELEPHONE (OUTPATIENT)
Dept: PULMONOLOGY | Facility: CLINIC | Age: 54
End: 2024-10-29

## 2024-10-29 VITALS
HEART RATE: 84 BPM | OXYGEN SATURATION: 96 % | BODY MASS INDEX: 26 KG/M2 | WEIGHT: 166 LBS | DIASTOLIC BLOOD PRESSURE: 72 MMHG | TEMPERATURE: 98.2 F | SYSTOLIC BLOOD PRESSURE: 116 MMHG

## 2024-10-29 DIAGNOSIS — Z91.09 ENVIRONMENTAL ALLERGIES: ICD-10-CM

## 2024-10-29 DIAGNOSIS — J01.01 ACUTE RECURRENT MAXILLARY SINUSITIS: ICD-10-CM

## 2024-10-29 DIAGNOSIS — R05.3 CHRONIC COUGH: ICD-10-CM

## 2024-10-29 DIAGNOSIS — J32.9 CHRONIC RHINOSINUSITIS: ICD-10-CM

## 2024-10-29 DIAGNOSIS — J45.50 SEVERE PERSISTENT ASTHMA WITHOUT COMPLICATION (H): Primary | ICD-10-CM

## 2024-10-29 DIAGNOSIS — R91.8 OPACITY OF LUNG ON IMAGING STUDY: ICD-10-CM

## 2024-10-29 DIAGNOSIS — K21.9 GASTROESOPHAGEAL REFLUX DISEASE WITHOUT ESOPHAGITIS: ICD-10-CM

## 2024-10-29 PROCEDURE — 99214 OFFICE O/P EST MOD 30 MIN: CPT | Performed by: NURSE PRACTITIONER

## 2024-10-29 RX ORDER — PREDNISONE 10 MG/1
TABLET ORAL
Qty: 30 TABLET | Refills: 0 | Status: SHIPPED | OUTPATIENT
Start: 2024-10-29 | End: 2024-11-10

## 2024-10-29 RX ORDER — DOXYCYCLINE 100 MG/1
100 CAPSULE ORAL 2 TIMES DAILY
Qty: 20 CAPSULE | Refills: 0 | Status: SHIPPED | OUTPATIENT
Start: 2024-10-29 | End: 2024-11-08

## 2024-10-29 ASSESSMENT — ASTHMA QUESTIONNAIRES
QUESTION_5 LAST FOUR WEEKS HOW WOULD YOU RATE YOUR ASTHMA CONTROL: COMPLETELY CONTROLLED
QUESTION_3 LAST FOUR WEEKS HOW OFTEN DID YOUR ASTHMA SYMPTOMS (WHEEZING, COUGHING, SHORTNESS OF BREATH, CHEST TIGHTNESS OR PAIN) WAKE YOU UP AT NIGHT OR EARLIER THAN USUAL IN THE MORNING: NOT AT ALL
QUESTION_2 LAST FOUR WEEKS HOW OFTEN HAVE YOU HAD SHORTNESS OF BREATH: ONCE OR TWICE A WEEK
ACT_TOTALSCORE: 22
QUESTION_4 LAST FOUR WEEKS HOW OFTEN HAVE YOU USED YOUR RESCUE INHALER OR NEBULIZER MEDICATION (SUCH AS ALBUTEROL): TWO OR THREE TIMES PER WEEK
QUESTION_1 LAST FOUR WEEKS HOW MUCH OF THE TIME DID YOUR ASTHMA KEEP YOU FROM GETTING AS MUCH DONE AT WORK, SCHOOL OR AT HOME: NONE OF THE TIME
ACT_TOTALSCORE: 22

## 2024-10-29 NOTE — PATIENT INSTRUCTIONS
It was a pleasure to see you in clinic today.   Here is what we discussed:    We will work on ordering Dupixent injections for you, we will keep you updated on when this is approved and you will bring it in for your first dose.  Start prednisone taper + doxycycline twice daily x10 days.  Also increase your neb usage.   Continue Trelegy Ellipta one puff daily, rinse/gargle after use.   Continue Duonebs or Albuterol inhaler every 4-6 hours as needed for shortness of breath or wheezing.  Continue Allegra 180mg daily.  I have referred you to GI.  Call my nurse, Jarrod ([phone]) with any change or worsening of your breathing.  Follow-up in 3 months.     Ольга Beatty, CNP  Pulmonary Medicine  [facility]  [phone]

## 2024-10-29 NOTE — TELEPHONE ENCOUNTER
PA Initiation    Medication: DUPIXENT 300 MG/2ML SC SOAJ  Insurance Company: [employer] - Phone [phone] Fax [phone]  Pharmacy Filling the Rx:    Filling Pharmacy Phone:    Filling Pharmacy Fax:    Start Date: 10/29/2024    Key: [id number]

## 2024-10-29 NOTE — PROGRESS NOTES
Pulmonary Clinic Follow-Up          Assessment/Plan:     54 year old female with a history of lifelong asthma, multiple allergies, hyper-IgE, chronic rhinosinusitis s/p FESS, presenting for 2 month follow-up.    Severe persistent asthma  Environmental allergies  Hyper-IgE  Chronic rhinosinusitis  Ground glass opacities  Lifelong asthma with multiple allergies, previously followed by Dr Rod in Allergy clinic and received immunotherapy x1.5 years.  She has required duplicate ICS, although she noted no benefit from cough.  Sinus surgery through [facility] in past.  Has had migrating ground glass nodular opacities and thickened airways. Possible focal areas of , though unclear.  Esophagitis also noted on this CT scan in 8/2022.  VSS without aspiration.  Broadly positive midwest respiratory allergen IgE panel and total IgE 560 kU/L in August 2022. She has stopped Singulair, no noted worsening - she is unsure if this was ever beneficial.  She is no longer receiving immunotherapy, she did not find benefit.  She continues to cough.  Of note, she is clearing her throat and sniffling throughout exam.  FeNO: 21  Esophagram 9/2024 with moderate-severe gastroesophageal reflux and small hiatal hernia.  Chest CT 9/12/24 with bronchiectasis, mucous plugging, tree-in-bud opacities in RUL, HARMAN and bilateral LLs.  As well as 9mm HARMAN nodule.  Eosinophils 0.5 and IgE 1088 on 8/29/24.  Sputum culture was ordered but she has not been able to produce sample yet.   Today she has acute worsening of her sinus symptoms and diffuse expiratory wheezes.    Plan:  - we reviewed all of the above recent work-up in detail today.  - start Dupixent 600mg loading dose, then 300mg every two weeks.  - referral to GI, for reflux, hiatal hernia, hx of esophagitis.  - ordered steroid taper + doxycycline for acute sinusitis and asthma exacerbation.  (She is unable to tolerate augmentin due to SEs)  - 3 month follow-up chest CT to monitor opacities and  nodule.  - follow-up with [facility], I referred her last visit for her chronic sinus issues.  I will try to reach out to their clinic as patient states she had appt but not with ENT provider.   - continue Trelegy Ellipta (fluticasone/umeclidinium/vilanterol) 200/62/5/25, one inhalation daily, rinse/gargle after use.  - encouraged her to increase her use of Duonebs for pulmonary hygiene, one-three times daily.  - continue fexofenadine 180 mg daily  - albuterol HFA or nebulized ipratropium-albuterol as needed.  - she is UTD with annual influenza vaccine, and prevnar 20.  Recommend COVID booster this fall.   - Action plan in case of sinus or lower respiratory exacerbation: prednisone 40 mg daily for 7 days.    Follow-up  - we will get her set up with nurse visit for injection training once she receives the Dupixent.  We will then set up telephone/video visit one month after first injection, and in person 3 months after.     Ольга Beatty, CNP  Pulmonary Medicine  [facility]  [phone]       CC:     Asthma follow-up     HPI:     54 year old female with a history of lifelong asthma, multiple allergies, hyper-IgE, chronic rhinosinusitis s/p FESS, presenting for 2 month follow-up.    Sinus congestion, headaches - thinks she has another sinus infection.  Breathing worsened recently.            2/29/2024     7:12 AM 8/24/2024    12:12 PM 10/29/2024     8:00 AM   ACT Total Scores   ACT TOTAL SCORE (Goal Greater than or Equal to 20) 23 21 22   In the past 12 months, how many times did you visit the emergency room for your asthma without being admitted to the hospital? 0  0  0   In the past 12 months, how many times were you hospitalized overnight because of your asthma? 0  0  0       Patient-reported        ROS:     6-point ROS performed and is negative aside from those listed in HPI.     Medical history:       PMH:  lifelong asthma  multiple allergies currently undergoing  immunotherapy in allergy clinic  hyper-IgE  chronic rhinosinusitis s/p FESS    PSH:  No past surgical history on file.    Allergies:  Allergies   Allergen Reactions    Articaine Swelling    Sulfamethoxazole-Trimethoprim Diarrhea       Family HX:  No family history on file.    Social Hx:  Social History     Socioeconomic History    Marital status: Single     Spouse name: Not on file    Number of children: Not on file    Years of education: Not on file    Highest education level: Not on file   Occupational History    Not on file   Tobacco Use    Smoking status: Never    Smokeless tobacco: Never   Vaping Use    Vaping status: Never Used   Substance and Sexual Activity    Alcohol use: Not on file    Drug use: Not on file    Sexual activity: Not on file   Other Topics Concern    Not on file   Social History Narrative    Not on file     Social Drivers of Health     Financial Resource Strain: Not on file   Food Insecurity: Not on file   Transportation Needs: Not on file   Physical Activity: Not on file   Stress: Not on file   Social Connections: Unknown (12/11/2023)    [facility] [employer] [facility]    Social Connections     Frequency of Communication with Friends and Family: Not on file   Interpersonal Safety: Not on file   Housing Stability: Not on file       Current Meds:  Current Outpatient Medications   Medication Sig Dispense Refill    albuterol (PROAIR HFA/PROVENTIL HFA/VENTOLIN HFA) 108 (90 Base) MCG/ACT inhaler Inhale 2 puffs into the lungs every 6 hours as needed for shortness of breath, wheezing or cough. 18 g 11    azelastine (ASTELIN) 0.1 % nasal spray Spray 1 spray into both nostrils at bedtime. 30 mL 11    citalopram (CELEXA) 40 MG tablet Take 40 mg by mouth daily      doxycycline hyclate (VIBRAMYCIN) 100 MG capsule Take 1 capsule (100 mg) by mouth 2 times daily for 10 days. 20 capsule 0    dupilumab (DUPIXENT) 300 MG/2ML prefilled pen  Inject 4 mLs (600 mg) subcutaneously once for 1 dose. 4 mL 0    [START ON 11/29/2024] dupilumab (DUPIXENT) 300 MG/2ML prefilled pen Inject 2 mLs (300 mg) subcutaneously every 28 (twenty-eight) days. Start this injection 28 days after the loading dose. 2 mL 11    EPINEPHrine (ANY BX GENERIC EQUIV) 0.3 MG/0.3ML injection 2-pack Inject into outer thigh for allergic reaction 4 each 0    fexofenadine (ALLEGRA) 180 MG tablet Take 1 tablet (180 mg) by mouth daily. 90 tablet 5    fluticasone (FLONASE) 50 MCG/ACT nasal spray Spray 2 sprays into both nostrils daily as needed for rhinitis or allergies. 18.2 mL 4    Fluticasone-Umeclidin-Vilanterol (TRELEGY ELLIPTA) 200-62.5-25 MCG/ACT oral inhaler Inhale 1 puff into the lungs daily. 28 each 11    ipratropium (ATROVENT) 0.03 % nasal spray INSTILL 2 SPRAYS INTO BOTH NOSTRILS 3 TIMES DAILY. 30 mL 1    ipratropium - albuterol 0.5 mg/2.5 mg/3 mL (DUONEB) 0.5-2.5 (3) MG/3ML neb solution Take 1 vial (3 mLs) by nebulization every 6 hours as needed for shortness of breath / dyspnea or wheezing 420 mL 1    Multiple Vitamin (MULTIVITAMIN ADULT PO) Take 1 tablet by mouth daily.      Omega-3 Fatty Acids (FISH OIL) 1200 MG capsule Take 1,200 mg by mouth daily.      ORDER FOR ALLERGEN IMMUNOTHERAPY Vaccine A MOLD/ INDOORALLERGENS/ RAGWEED  MM Mold Mix Alternaria, Aspergilus, Cladosporium, Penicillium 1:10 w/v, 1.0 ml  DFM Df Mite D farinae 10,000 AU, 0.5 ml  DPM Dp Mite D pteronyssinus. 10,000 AU, 0.5 ml  DOG AP Dog hair & dander, mixed breeds 1:10 w/v, 0.5 ml  Vaccine B POLLENS/ CAT  G GRASS MIX [place] Blue, Orchard, Redtop, Marco A 100, 000 BAU 0.3 ml  ELM Elm, American Ulmus Americana 1:20 w/v, 0.5 ml  MAP Maple, Hard/Sugar Acer saccharum 1:20 w/v, 0.5 ml  LQ Lamb's Quarters Chenopodium album 1:20 w/v, 0.5 ml  CAT Cat Hair 10,000 BAU 2.0 ml  TRUNG Trung, White Fraxinus Americana 1:20 w/v, 0.5 ml  Dilutions: None (red) Frequency: weekly  1/10 (yellow) Frequency: weekly  1/100 (blue)  Frequency: weekly  1/1000 (green) Frequency: weekly      Diluent: HSA qs to 5ml 5 mL 11    predniSONE (DELTASONE) 10 MG tablet Take 4 tablets (40 mg) by mouth daily for 3 days, THEN 3 tablets (30 mg) daily for 3 days, THEN 2 tablets (20 mg) daily for 3 days, THEN 1 tablet (10 mg) daily for 3 days. 30 tablet 0    rosuvastatin (CRESTOR) 10 MG tablet Take 10 mg by mouth daily          Physical Exam:     /72   Pulse 84   Temp 98.2  F (36.8  C) (Oral)   Wt 75.3 kg (166 lb)   SpO2 96%   BMI 26.00 kg/m    Gen: adult female, appears in NAD  HEENT: clear conjunctivae, moist mucous membranes.  Clearing throat, sniffling.  CV: RRR, no M/G/R  Resp: diffuse expiratory wheezes and coarse rhonchi. Respirations even and unlabored.  On RA.  No cough.  Skin: no apparent rashes on visible skin  Ext: no cyanosis, clubbing or edema  Neuro: alert and answering questions appropriately     Data:       Labs:  reviewed    Imaging studies:    Chest CT 9/12/24:  IMPRESSION:   1.  Lower lung predominant bronchiectasis with diffuse tree-in-bud opacities, suggesting infectious or inflammatory bronchiolitis. Atypical infection such as mycobacterial avium complex (MAC) could have this appearance.  2.  9 mm solid nodule in the left upper lobe, favored to be infectious/inflammatory however recommend follow-up CT chest in 3-6 months to reassess.      Chest CT (August 2022):                                                        IMPRESSION:   1.  Resolution of the mixed attenuation opacity in the posterior apical left upper lobe present 04/21/2022 consistent with an area of airspace inflammation/infection. New, smaller foci of groundglass inflammation are present posterior medial left upper   lobe and in the medial right apex consistent with inflammatory nodules.  2.  Extensive airway wall thickening which conform to a posterior/dependent distribution including endoluminal material in subsegmental airways in the lower lobes and right middle  lobe. The pattern is suspect for bronchopulmonary aspiration.  3.  Multiple enlarged hilar, subcarinal and mediastinal lymph nodes. Presumably reactive in the setting of #2.  4.  Mild wall thickening of the distal esophagus suggesting esophagitis.  5.  Subpleural 4 x 6 mm nodule in the anterior left upper lobe and solid subpleural 3 to 4 mm pulmonary nodule in the posterior right upper lobe have been stable for 4 months favoring benign nodules.  6.  Heterogeneous 2.1 x 2.2 cm right thyroid nodule. If none previous evaluated, thyroid ultrasound is recommended.      Pulmonary Function Testing  May 2022  FVC 3.98 (104%)  FEV1 2.76 (90%)  FEV1/FVC 0.69 (LLN 0.69)  No bronchodilator response  RV 3.17 (168%)  TLC 7.17 (131%)  DLCOc 99%

## 2024-10-30 ENCOUNTER — TELEPHONE (OUTPATIENT)
Dept: PULMONOLOGY | Facility: CLINIC | Age: 54
End: 2024-10-30
Payer: COMMERCIAL

## 2024-10-30 NOTE — TELEPHONE ENCOUNTER
Called [facility] to speak to Dr Freedman - to update her about Dupixent order and discuss labs she ordered on patient.  Left message with nurse.

## 2024-10-31 ENCOUNTER — TELEPHONE (OUTPATIENT)
Dept: PULMONOLOGY | Facility: CLINIC | Age: 54
End: 2024-10-31
Payer: COMMERCIAL

## 2024-10-31 ENCOUNTER — TELEPHONE (OUTPATIENT)
Dept: PULMONOLOGY | Facility: CLINIC | Age: 54
End: 2024-10-31

## 2024-10-31 NOTE — TELEPHONE ENCOUNTER
Spoke with Dr Rocio Freedman at [facility] over the phone today.  MPO antibody high/positive.  Needs work-up for possible vasculitis.  Dr Freedman is waiting on the sinus CT she ordered - may start work-up for vasculitis in their clinic and refer to rheumatology if any concerns.  She recommended we hold off on Dupixent for now until full work-up is finished.  May want to move forward with Nucala or Fasenra instead for Biologic therapy.  No Dupixent or Xolair if any concerns of vasculitis or EGPA.  I called patient to update her, no answer, left voicemail.  Patient has follow-up with Dr Freedman in a week or two, I will have schedulers reach out to patient to schedule a virtual visit with me in one month to follow-up on the work-up being done by [facility].

## 2024-10-31 NOTE — TELEPHONE ENCOUNTER
Phone message from patient.  States she received a phone message from Ольга Beatty.  Is returning her phone call to discuss plan.

## 2024-10-31 NOTE — TELEPHONE ENCOUNTER
REFERRAL INFORMATION:  Referring Provider:  Ольга Beatty NP   Referring Clinic:  [facility] PULMONOLOGY   Reason for Visit/Diagnosis:   R05.3 (ICD-10-CM) - Chronic cough   K21.9 (ICD-10-CM) - Gastroesophageal reflux disease without esophagitis   mod-severe spontaneous reflux on esophagram. chronic cough despite triple inhaled therapy for asthma.        FUTURE VISIT INFORMATION:  Appointment Date: 11/19/24  Appointment Time:      NOTES RECORD STATUS  DETAILS   OFFICE NOTE from Referring Provider Internal 10/29/24, more in Epic   PERTINENT LABS Internal    IMAGING (CT, MRI, US, XR)  Internal 9/12/24, 8/15/22-CT chest  9/4/24-XR Esophagram  9/1/22-XR video swallow     Records Requested    Facility    Fax:    Outcome

## 2024-11-01 NOTE — TELEPHONE ENCOUNTER
PRIOR AUTHORIZATION DENIED    Medication: DUPIXENT 300 MG/2ML SC SOAJ  Insurance Company: [employer] - Phone [phone] Fax [phone]  Denial Date: 10/30/2024  Denial Reason(s):       Appeal Information:     Patient Notified:

## 2024-11-05 ENCOUNTER — TELEPHONE (OUTPATIENT)
Dept: PULMONOLOGY | Facility: CLINIC | Age: 54
End: 2024-11-05
Payer: COMMERCIAL

## 2024-11-05 NOTE — TELEPHONE ENCOUNTER
Called patient to update her after I heard fr Dr Freedman at [facility].  Plan for patient to see ENT provider within their system after sinus CT looked concerning.  They are initiating work-up for possible vasculitis.  I will have patient continue to follow there for her breathing/sinus issues.  Let her know she can cancel January appt with me - we will keep CT scan for January.  She can always follow-up with us if she will no longer be following there.  Dr Freedman will be keeping me updated on work-up and next steps.

## 2024-11-14 ENCOUNTER — TELEPHONE (OUTPATIENT)
Dept: PULMONOLOGY | Facility: CLINIC | Age: 54
End: 2024-11-14
Payer: COMMERCIAL

## 2024-11-14 NOTE — TELEPHONE ENCOUNTER
Prior Authorization Retail Medication Request    Medication/Dose: Trelegy ellipta inhaler 200-62.5-5.25  Diagnosis and ICD code (if different than what is on RX):  J45.50  New/renewal/insurance change PA/secondary ins. PA:  Previously Tried and Failed:   pulmicort, difficult patient with mulitiple allergies requiring duplicate ICS therapy   Rationale:  multiple allergies and needs the triple therapy to control her symptoms       Insurance   Primary: [facility]   Insurance ID:  [id number]     Secondary (if applicable):  Insurance ID:      Pharmacy Information (if different than what is on RX)  Name:  [facility] pharmacy  Phone:  [phone]  Fax:[phone]    Clinic Information  Preferred routing pool for dept communication: [facility] PULMONOLOG[facility] RN POOL      This is yearly renewal of previous PA.  See encounter 19/19/2023

## 2024-11-19 ENCOUNTER — MYC REFILL (OUTPATIENT)
Dept: PULMONOLOGY | Facility: CLINIC | Age: 54
End: 2024-11-19

## 2024-11-19 ENCOUNTER — PRE VISIT (OUTPATIENT)
Dept: GASTROENTEROLOGY | Facility: CLINIC | Age: 54
End: 2024-11-19

## 2024-11-19 ENCOUNTER — VIRTUAL VISIT (OUTPATIENT)
Dept: GASTROENTEROLOGY | Facility: CLINIC | Age: 54
End: 2024-11-19
Attending: NURSE PRACTITIONER
Payer: COMMERCIAL

## 2024-11-19 VITALS — BODY MASS INDEX: 25.9 KG/M2 | WEIGHT: 165 LBS | HEIGHT: 67 IN

## 2024-11-19 DIAGNOSIS — R05.3 CHRONIC COUGH: ICD-10-CM

## 2024-11-19 DIAGNOSIS — J45.50 SEVERE PERSISTENT ASTHMA WITHOUT COMPLICATION (H): ICD-10-CM

## 2024-11-19 PROCEDURE — 99202 OFFICE O/P NEW SF 15 MIN: CPT | Mod: 95 | Performed by: PHYSICIAN ASSISTANT

## 2024-11-19 ASSESSMENT — PAIN SCALES - GENERAL: PAINLEVEL_OUTOF10: NO PAIN (0)

## 2024-11-19 NOTE — LETTER
11/19/2024      Lesley Napoles  [address]  [facility] [address]      Dear Colleague,    Thank you for referring your patient, Lesley Napoles, to the [facility] [place]. Please see a copy of my visit note below.    Virtual Visit Details    Type of service:  Video Visit   Video Start Time: 7:54 AM  Video End Time:8:10 AM    Originating Location (pt. Location): Other Parked Car    Distant Location (provider location):  Off-site  Platform used for Video Visit: [facility]    Gastroenterology Visit for: Lesley Napoles 1970   MRN: [id number]     Reason for Visit:  chief complaint    Referred by: Rogerio  / Yobani JAMES [facility] [id number]  Patient Care Team:  Theodore Ardon MD as PCP - General (Family Medicine)  Galina Rod MD as Assigned Allergy Provider  Ольга Beatty NP as Nurse Practitioner (Pulmonary Disease)  Ольга Beatty NP as Assigned Pulmonology Provider  Patti Martínez PA-C as Physician Assistant (Gastroenterology)    History of Present Illness:   Lesley Napoles is a 54 year old female with significant past medical history pertinent for anxiety, depression, asthma, environmental allergies, chronic rhinosinusitis, hyper IgE who is presenting as a new patient in consultation at the request of Ольга Beatty NP for chronic cough and GERD with a chief complaint of chronic cough.    ---------------------------------------------------------------------------------------------------------------------------------------------------------------------  HPI 11/19/2024    Chronic Cough - Onset several years (~3 years) prior described as stables. The cough is occurring throughout the duration of the day and is described as dry as well as productive. Once every 1-2 weeks wakes at night with the cough although denies heartburn, regurgitation or waterbrash symptoms. No relation to oral intake either eating or drinking. Triggers include laying down  flat.     Associated symptoms include PND.     Denies weight loss, nausea, emesis, dysphagia, odynophagia, dysphonia/hoarseness, neck pain/swelling/mass, heartburn, regurgitation, abdominal pain, diarrhea, melena, hematochezia and BRBR.     No consumption of coffee and will drink 1 can of soda in the evening. This does not induce the cough.     Denies use of ETOH or tobacco products.     No family history or GI related malignancy (esophageal, gastric, pancreatic, liver or colon).     No allergy to Nickel.     Esophageal Questionnaire(s)    BEDQ Questionnaire      11/11/2024     2:30 PM   BEDQ Questionnaire: How Often Have You Had the Following?   Trouble eating solid food (meat, bread, vegetables) 0    Trouble eating soft foods (yogurt, jello, pudding) 0    Trouble swallowing liquids 0    Pain while swallowing 0    Coughing or choking while swallowing foods or liquids 1    Total Score: 1        Patient-reported         11/11/2024     2:30 PM   BEDQ Questionnaire: Discomfort/Pain Ratings   Eating solid food (meat, bread, vegetables) 0    Eating soft foods (yogurt, jello, pudding) 0    Drinking liquid 0    Total Score: 0        Patient-reported       Eckardt Questionnaire      11/11/2024     2:31 PM   Eckardt Questionnaire   Dysphagia 0    Regurgitation 0    Retrosternal Pain 0    Weight Loss (kg) 0    Total Score:  0        Patient-reported       Promis 10 Questionnaire      11/11/2024     2:32 PM   PROMIS 10 FLOWSHEET DATA   In general, would you say your health is: 3    In general, would you say your quality of life is: 4    In general, how would you rate your physical health? 3    In general, how would you rate your mental health, including your mood and your ability to think? 4    In general, how would you rate your satisfaction with your social activities and relationships? 4    In general, please rate how well you carry out your usual social activities and roles. (This includes activities at home, at work and  in your community, and responsibilities as a parent, child, spouse, employee, friend, etc.) 4    To what extent are you able to carry out your everyday physical activities such as walking, climbing stairs, carrying groceries, or moving a chair? 5    In the past 7 days, how often have you been bothered by emotional problems such as feeling anxious, depressed, or irritable? 1    In the past 7 days, how would you rate your fatigue on average? 2    In the past 7 days, how would you rate your pain on average, where 0 means no pain, and 10 means worst imaginable pain? 0    Mental health question re-calculation - no clinical value 5    Physical health question re-calculation - no clinical value 4    Pain question re-calculation - no clinical value 5    Global Mental Health Score 17    Global Physical Health Score 17    PROMIS TOTAL - SUBSCORES 34        Patient-reported           STUDIES & PROCEDURES:    EGD:       Colonoscopy:     EndoFLIP directed at the UES or LES (8cm (EF-325) balloon length or 16cm (EF-322) balloon length):   Date:  8cm balloon  Balloon inflation Balloon pressure CSA (mm^2) DI (mm^2/mmHg) Dmin (mm) Compliance   20 (ladmark ID)        30        40        50           16cm balloon  Balloon inflation Balloon pressure CSA (mm^2) DI (mm^2/mmHg) Dmin (mm) Compliance   30 (ladmark ID)        40        50        60        70           High Resolution Manometry:    PH/Impedance:     Bravo:    CT:    Esophagram:    9/4/2024 Standard WO Tablet   FINDINGS:   ESOPHAGUS: Normal caliber cervical esophagus without evidence of a stricture, suspicious filling defect or diverticulum.     Normal caliber thoracic esophagus without evidence of a stricture or suspicious filling defect. Small sliding-type hiatal hernia. Moderate to severe spontaneous gastroesophageal reflux.                                                                      IMPRESSION:   1.  Small sliding-type hiatal hernia.  2.  Moderate to severe  spontaneous gastroesophageal reflux.    FL VSS:     GES:    U/S:     XRAY:    Other:       Prior medical records were reviewed including, but not limited to, notes from referring providers, lab work, radiographic tests, and other diagnostic tests. Pertinent results were summarized above.     History   No past medical history on file.    No past surgical history on file.    Social History     Socioeconomic History     Marital status: Single     Spouse name: Not on file     Number of children: Not on file     Years of education: Not on file     Highest education level: Not on file   Occupational History     Not on file   Tobacco Use     Smoking status: Never     Smokeless tobacco: Never   Vaping Use     Vaping status: Never Used   Substance and Sexual Activity     Alcohol use: Not on file     Drug use: Not on file     Sexual activity: Not on file   Other Topics Concern     Not on file   Social History Narrative     Not on file     Social Drivers of Health     Financial Resource Strain: Not on file   Food Insecurity: Not on file   Transportation Needs: Not on file   Physical Activity: Not on file   Stress: Not on file   Social Connections: Unknown (12/11/2023)    [facility] [facility] [employer] [facility]    Social Connections      Frequency of Communication with Friends and Family: Not on file   Interpersonal Safety: Not on file   Housing Stability: Not on file       No family history on file.  Family history reviewed and edited as appropriate    Medications and Allergies:     Outpatient Encounter Medications as of 11/19/2024   Medication Sig Dispense Refill     albuterol (PROAIR HFA/PROVENTIL HFA/VENTOLIN HFA) 108 (90 Base) MCG/ACT inhaler Inhale 2 puffs into the lungs every 6 hours as needed for shortness of breath, wheezing or cough. 18 g 11     azelastine (ASTELIN) 0.1 % nasal spray Spray 1 spray into both nostrils at bedtime. 30 mL 11     citalopram  (CELEXA) 40 MG tablet Take 40 mg by mouth daily       EPINEPHrine (ANY BX GENERIC EQUIV) 0.3 MG/0.3ML injection 2-pack Inject into outer thigh for allergic reaction 4 each 0     fexofenadine (ALLEGRA) 180 MG tablet Take 1 tablet (180 mg) by mouth daily. 90 tablet 5     fluticasone (FLONASE) 50 MCG/ACT nasal spray Spray 2 sprays into both nostrils daily as needed for rhinitis or allergies. 18.2 mL 4     Fluticasone-Umeclidin-Vilanterol (TRELEGY ELLIPTA) 200-62.5-25 MCG/ACT oral inhaler Inhale 1 puff into the lungs daily. 28 each 11     ipratropium (ATROVENT) 0.03 % nasal spray INSTILL 2 SPRAYS INTO BOTH NOSTRILS 3 TIMES DAILY. 30 mL 1     ipratropium - albuterol 0.5 mg/2.5 mg/3 mL (DUONEB) 0.5-2.5 (3) MG/3ML neb solution Take 1 vial (3 mLs) by nebulization every 6 hours as needed for shortness of breath / dyspnea or wheezing 420 mL 1     Multiple Vitamin (MULTIVITAMIN ADULT PO) Take 1 tablet by mouth daily.       Omega-3 Fatty Acids (FISH OIL) 1200 MG capsule Take 1,200 mg by mouth daily.       ORDER FOR ALLERGEN IMMUNOTHERAPY Vaccine A MOLD/ INDOORALLERGENS/ RAGWEED  MM Mold Mix Alternaria, Aspergilus, Cladosporium, Penicillium 1:10 w/v, 1.0 ml  DFM Df Mite D farinae 10,000 AU, 0.5 ml  DPM Dp Mite D pteronyssinus. 10,000 AU, 0.5 ml  DOG AP Dog hair & dander, mixed breeds 1:10 w/v, 0.5 ml  Vaccine B POLLENS/ CAT  G GRASS MIX Kentucky Blue, Orchard, Redtop, Marco A 100, 000 BAU 0.3 ml  ELM Elm, American Ulmus Americana 1:20 w/v, 0.5 ml  MAP Maple, Hard/Sugar Acer saccharum 1:20 w/v, 0.5 ml  LQ Lamb's Quarters Chenopodium album 1:20 w/v, 0.5 ml  CAT Cat Hair 10,000 BAU 2.0 ml  TRUNG Trung, White Fraxinus Americana 1:20 w/v, 0.5 ml  Dilutions: None (red) Frequency: weekly  1/10 (yellow) Frequency: weekly  1/100 (blue) Frequency: weekly  1/1000 (green) Frequency: weekly      Diluent: HSA qs to 5ml 5 mL 11     rosuvastatin (CRESTOR) 10 MG tablet Take 10 mg by mouth daily       No facility-administered encounter medications on  "file as of 11/19/2024.        Allergies   Allergen Reactions     Articaine Swelling     Sulfamethoxazole-Trimethoprim Diarrhea        Review of systems:  A full 10 point review of systems was obtained and was negative except for the pertinent positives and negatives stated within the HPI.    Objective Findings:   Physical Exam:    Constitutional: Ht 1.702 m (5' 7\")   Wt 74.8 kg (165 lb)   BMI 25.84 kg/m    General: Alert, cooperative, no distress, well-appearing  Head: Atraumatic, normocephalic, no obvious abnormalities   Eyes: Sclera anicteric, no obvious conjunctival hemorrhage   Nose: Nares normal, no obvious malformation, no obvious rhinorrhea   Respiratory: Resting comfortably, no apparent distress, no cough.   Skin: No jaundice, no obvious rash  Neurologic: AAOx3, no obvious neurologic abnormality  Psychiatric: Normal Affect, appropriate mood  Extremities: No obvious edema, no obvious malformation     Labs, Radiology, Pathology     Lab Results   Component Value Date    WBC 6.9 08/29/2024    WBC 11.0 08/22/2022    WBC 7.4 04/25/2022    HGB 13.2 08/29/2024    HGB 13.2 08/22/2022    HGB 13.2 04/25/2022     08/29/2024     08/22/2022     04/25/2022    CHOL 200 (H) 08/22/2024    CHOL 167 08/18/2023    CHOL 172 10/10/2022    TRIG 107 08/22/2024    TRIG 140 08/18/2023    TRIG 79 10/10/2022    HDL 55 08/22/2024    HDL 45 (L) 08/18/2023    HDL 53 10/10/2022    ALT 14 04/25/2022    AST 22 08/17/2022    AST 15 04/25/2022     04/25/2022     07/29/2019    BUN 14 04/25/2022    BUN 10 07/29/2019    CO2 26 04/25/2022    CO2 25 07/29/2019    TSH 0.77 07/29/2019        Liver Function Studies -   Recent Labs   Lab Test 08/17/22  0955 04/25/22  1436   PROTTOTAL  --  7.1   ALBUMIN  --  3.8   BILITOTAL  --  0.4   ALKPHOS  --  83   AST 22 15   ALT  --  14          Patient Active Problem List    Diagnosis Date Noted     Allergic rhinitis caused by mold 01/12/2023     Priority: Medium     Allergic " rhinitis due to dust mite 01/12/2023     Priority: Medium     Allergic rhinitis due to animals 01/12/2023     Priority: Medium     Allergic rhinitis due to pollen 06/13/2022     Priority: Medium     Constipation by delayed colonic transit 06/13/2022     Priority: Medium     Excessive and frequent menstruation 06/13/2022     Priority: Medium     Generalized anxiety disorder 06/13/2022     Priority: Medium     Moderate persistent asthma without complication 06/13/2022     Priority: Medium     Recurrent major depression in full remission (H) 06/13/2022     Priority: Medium      Assessment and Plan   Assessment/Plan:    Lesley Napoles is a 54 year old female with significant past medical history pertinent for anxiety, depression, asthma, environmental allergies, chronic rhinosinusitis, hyper IgE who is presenting as a new patient in consultation at the request of Ольга Beatty NP for chronic cough and GERD with a chief complaint of chronic cough.    #Chronic Cough     10/29/2024 patient was seen by pulmonology for severe persistent asthma, environmental allergies, hyper IgE, chronic rhinosinusitis and groundglass opacities.    Today Lesley presents with an approximate 3-year history of chronic cough which is occurring sporadically throughout the day without relation to oral intake.  Triggers include laying down flat.  Associated symptoms include PND.  She has a pertinent past medical history for multiple atopic disorders as listed above.  Prior evaluation is pertinent for a standard barium esophagram without tablet notable for a small sliding hiatal hernia and moderate to severe spontaneous reflux.     As she presents with extra esophageal manifestations of reflux objective pH monitoring was recommended for which Lesley is agreeable to today. With her strong history of atopy biopsies will also be obtained for eosinophilic esophagitis.  Additional differential includes esophageal dysmotility however clinical  suspicion is low based on her presenting symptoms.    - Upper endoscopic evaluation +/- dilation with 96 hour BRAVO study to be completed off acid suppressive therapy.  Please hold any over-the-counter acid suppressive regimen/antacids for a minimum of 2 weeks prior to the procedure  - Results from the above procedures have been taking approximately 6 to 8 weeks to be finalized.  Please follow-up in clinic accordingly    #Colorectal Cancer Screening   Cologuard negative 9/7/2024. No family history of CRC. Per the most recent update by the [place] Multi-Society Task Force on Colorectal Cancer recall should be 3 years, 2027 or sooner if otherwise indicated.       Follow up plan:   Return to clinic 4 months and as needed.    The risks and benefits of my recommendations, as well as other treatment options were discussed with the patient and any available family today. All questions were answered.     Follow up: As planned above. Today, I personally spent 18 minutes in direct face to face time with the patient, of which greater than 50% of the time was spent in patient education and counseling as described above. Approximately 8 minutes were spent on indirect care associated with the patient's consultation including but not limited to review of: patient medical records to date, clinic visits, hospital records, lab results, imaging studies, procedural documentation, and coordinating care with other providers. The findings from this review are summarized in the above note. All of the above accounted for a cumulative time of 26 minutes and was performed on the date of service.     The patient verbalized understanding of the plan and was appreciative for the time spent and information provided during the office visit.           Patti Martínez PA-C  Division of Gastroenterology, Hepatology, and Nutrition  [facility]       Documentation assisted by voice recognition and documentation system.            Again, thank you  for allowing me to participate in the care of your patient.        Sincerely,        Patti Martínez PA-C

## 2024-11-19 NOTE — TELEPHONE ENCOUNTER
Retail Pharmacy Prior Authorization Team   Phone: [phone]    PA Initiation    Medication: TRELEGY ELLIPTA 200-62.5-25 MCG/ACT IN AEPB  Insurance Company: [employer] - Phone [phone] Fax [phone]  Pharmacy Filling the Rx: [id number] [place] [facility] - [place], [place] - [address]  Filling Pharmacy Phone: [phone]  Filling Pharmacy Fax:    Start Date: 11/19/2024

## 2024-11-19 NOTE — PATIENT INSTRUCTIONS
It was a pleasure meeting with you today and discussing your healthcare plan. Below is a summary of what we covered:    - Upper endoscopic evaluation +/- dilation with 96 hour BRAVO study to be completed off acid suppressive therapy.  Please hold any over-the-counter acid suppressive regimen/antacids for a minimum of 2 weeks prior to the procedure  - Results from the above procedures have been taking approximately 6 to 8 weeks to be finalized.  Please follow-up in clinic accordingly      Gastroesophageal Reflux Disease (GERD) Lifestyle Modifications:   If taking acid suppression therapy (PPI ie Pantoprazole, Lansoprazole, Omeprazole, Esomeprazole, Rabeprazole, Dexlansoprazole) it should be taken 30 - 60 minutes prior to meals on an empty stomach to have maximum effect  Avoid triggers for reflux such as coffee, chocolate, carbonated beverages, spicy foods, acidic foods (tomato based/citrus and foods with high fat content   Abstinence from alcohol and cessation of all tobacco products is recommended   Studies have shown that weight loss, exercise and maintaining a healthy BMI significantly reduce GERD symptoms   Remain upright while eating and immediately after meals  Do not eat or drink at least 3 hours prior to laying down supine/laying down for bed   Avoid late night/middle of the night snacking    Consider obtaining a wedge pillow or elevating the head end of the bed while sleeping   Avoid sleeping right side down as this can place the lower part of the esophagus/lower esophageal sphincter in a dependent position that favors reflux   Attempting to eat smaller more frequent meals may improve symptoms         Please call my nurse Yolanda ([phone]) with any questions or concerns.      See below for any additional questions and scheduling guidelines.    Sign up for [employer]: [employer] patient portal serves as a secure platform for accessing your medical records from the [facility]. Additionally,  [employer] facilitates easy, timely, and secure messaging with your care team. If you have not signed up, you may do so by using the provided code or calling [phone].    Coordinating your care after your visit:  There are multiple options for scheduling your follow-up care based on your provider's recommendation.    How do I schedule a follow-up clinic appointment:   After your appointment, you may receive scheduling assistance with the Clinic Coordinators by having a seat in the waiting room and a Clinic Coordinator will call you up to schedule.  Virtual visits or after you leave the clinic:  Your provider has placed a follow-up order in the [employer] portal for scheduling your return appointment. A member of the scheduling team will contact you to schedule.  [employer] Scheduling: Timely scheduling through [employer] is advised to ensure appointment availability.   Call to schedule: You may schedule your follow-up appointment(s) by calling [phone], option 1.    How do I schedule my endoscopy or colonoscopy procedure:  If a procedure, such as a colonoscopy or upper endoscopy was ordered by your provider, the scheduling team will contact you to schedule this procedure. Or you may choose to call to schedule at   [phone], option 2.  Please allow 20-30 minutes when scheduling a procedure.    How do I get my blood work done? To get your blood work done, you need to schedule a lab appointment at an [facility]. There are multiple ways to schedule:   At the clinic: The Clinic Coordinator you meet after your visit can help you schedule a lab appointment.   [employer] scheduling: [employer] offers online lab scheduling at all [facility] laboratory locations.   Call to schedule: You can call [phone] to schedule your lab appointment.    How do I schedule my imaging study: To schedule imaging studies, such as CT scans, ultrasounds, MRIs, or X-rays, contact Imaging Services at [phone].    How do  I schedule a referral to another doctor: If your provider recommended a referral to another specialist(s), the referral order was placed by your provider. You will receive a phone call to schedule this referral, or you may choose to call the number attached to the referral to self-schedule.    For Post-Visit Question(s):  For any inquiries following today's visit:  Please utilize [employer] messaging and allow 48 hours for reply or contact the Call Center during normal business hours at [phone], option 3.  For Emergent After-hours questions, contact the On-Call GI Fellow through the [facility] at [phone].  In addition, you may contact your Nurse directly using the provided contact information.    Test Results:  Test results will be accessible via [employer] in compliance with the 21st Century Cures Act. This means that your results will be available to you at the same time as your provider. Often you may see your results before your provider does. Results are reviewed by staff within two weeks with communication follow-up. Results may be released in the patient portal prior to your care team review.    Prescription Refill(s):  Medication prescribed by your provider will be addressed during your visit. For future refills, please coordinate with your pharmacy. If you have not had a recent clinic visit or routine labs, for your safety, your provider may not be able to refill your prescription.         Sincerely,    Patti Martínez PA-C  Division of Gastroenterology, Hepatology, and Nutrition  [facility]

## 2024-11-19 NOTE — PROGRESS NOTES
Virtual Visit Details    Type of service:  Video Visit   Video Start Time: 7:54 AM  Video End Time:8:10 AM    Originating Location (pt. Location): Other Parked Car    Distant Location (provider location):  Off-site  Platform used for Video Visit: [facility]    Gastroenterology Visit for: Lesley Napoles 1970   MRN: [id number]     Reason for Visit:  chief complaint    Referred by: Rogerio  / [facility] [id number]  Patient Care Team:  Theodore Ardon MD as PCP - General (Family Medicine)  Galina Rod MD as Assigned Allergy Provider  Ольга Beatty NP as Nurse Practitioner (Pulmonary Disease)  Ольга Beatty NP as Assigned Pulmonology Provider  Patti Martínez PA-C as Physician Assistant (Gastroenterology)    History of Present Illness:   Lesley Napoles is a 54 year old female with significant past medical history pertinent for anxiety, depression, asthma, environmental allergies, chronic rhinosinusitis, hyper IgE who is presenting as a new patient in consultation at the request of Ольга Beatty NP for chronic cough and GERD with a chief complaint of chronic cough.    ---------------------------------------------------------------------------------------------------------------------------------------------------------------------  [facility] 11/19/2024    Chronic Cough - Onset several years (~3 years) prior described as stables. The cough is occurring throughout the duration of the day and is described as dry as well as productive. Once every 1-2 weeks wakes at night with the cough although denies heartburn, regurgitation or waterbrash symptoms. No relation to oral intake either eating or drinking. Triggers include laying down flat.     Associated symptoms include PND.     Denies weight loss, nausea, emesis, dysphagia, odynophagia, dysphonia/hoarseness, neck pain/swelling/mass, heartburn, regurgitation, abdominal pain, diarrhea, melena, hematochezia and BRBR.     No  consumption of coffee and will drink 1 can of soda in the evening. This does not induce the cough.     Denies use of ETOH or tobacco products.     No family history or GI related malignancy (esophageal, gastric, pancreatic, liver or colon).     No allergy to Nickel.     Esophageal Questionnaire(s)    BEDQ Questionnaire      11/11/2024     2:30 PM   BEDQ Questionnaire: How Often Have You Had the Following?   Trouble eating solid food (meat, bread, vegetables) 0    Trouble eating soft foods (yogurt, jello, pudding) 0    Trouble swallowing liquids 0    Pain while swallowing 0    Coughing or choking while swallowing foods or liquids 1    Total Score: 1        Patient-reported         11/11/2024     2:30 PM   BEDQ Questionnaire: Discomfort/Pain Ratings   Eating solid food (meat, bread, vegetables) 0    Eating soft foods (yogurt, jello, pudding) 0    Drinking liquid 0    Total Score: 0        Patient-reported       Eckardt Questionnaire      11/11/2024     2:31 PM   Eckardt Questionnaire   Dysphagia 0    Regurgitation 0    Retrosternal Pain 0    Weight Loss (kg) 0    Total Score:  0        Patient-reported       Promis 10 Questionnaire      11/11/2024     2:32 PM   PROMIS 10 FLOWSHEET DATA   In general, would you say your health is: 3    In general, would you say your quality of life is: 4    In general, how would you rate your physical health? 3    In general, how would you rate your mental health, including your mood and your ability to think? 4    In general, how would you rate your satisfaction with your social activities and relationships? 4    In general, please rate how well you carry out your usual social activities and roles. (This includes activities at home, at work and in your community, and responsibilities as a parent, child, spouse, employee, friend, etc.) 4    To what extent are you able to carry out your everyday physical activities such as walking, climbing stairs, carrying groceries, or moving a chair?  5    In the past 7 days, how often have you been bothered by emotional problems such as feeling anxious, depressed, or irritable? 1    In the past 7 days, how would you rate your fatigue on average? 2    In the past 7 days, how would you rate your pain on average, where 0 means no pain, and 10 means worst imaginable pain? 0    Mental health question re-calculation - no clinical value 5    Physical health question re-calculation - no clinical value 4    Pain question re-calculation - no clinical value 5    Global Mental Health Score 17    Global Physical Health Score 17    PROMIS TOTAL - SUBSCORES 34        Patient-reported           STUDIES & PROCEDURES:    EGD:       Colonoscopy:     EndoFLIP directed at the UES or LES (8cm (EF-325) balloon length or 16cm (EF-322) balloon length):   Date:  8cm balloon  Balloon inflation Balloon pressure CSA (mm^2) DI (mm^2/mmHg) Dmin (mm) Compliance   20 (ladmark ID)        30        40        50           16cm balloon  Balloon inflation Balloon pressure CSA (mm^2) DI (mm^2/mmHg) Dmin (mm) Compliance   30 (ladmark ID)        40        50        60        70           High Resolution Manometry:    PH/Impedance:     Bravo:    CT:    Esophagram:    9/4/2024 Standard WO Tablet   FINDINGS:   ESOPHAGUS: Normal caliber cervical esophagus without evidence of a stricture, suspicious filling defect or diverticulum.     Normal caliber thoracic esophagus without evidence of a stricture or suspicious filling defect. Small sliding-type hiatal hernia. Moderate to severe spontaneous gastroesophageal reflux.                                                                      IMPRESSION:   1.  Small sliding-type hiatal hernia.  2.  Moderate to severe spontaneous gastroesophageal reflux.    FL VSS:     GES:    U/S:     XRAY:    Other:       Prior medical records were reviewed including, but not limited to, notes from referring providers, lab work, radiographic tests, and other diagnostic tests.  Pertinent results were summarized above.     History   No past medical history on file.    No past surgical history on file.    Social History     Socioeconomic History    Marital status: Single     Spouse name: Not on file    Number of children: Not on file    Years of education: Not on file    Highest education level: Not on file   Occupational History    Not on file   Tobacco Use    Smoking status: Never    Smokeless tobacco: Never   Vaping Use    Vaping status: Never Used   Substance and Sexual Activity    Alcohol use: Not on file    Drug use: Not on file    Sexual activity: Not on file   Other Topics Concern    Not on file   Social History Narrative    Not on file     Social Drivers of Health     Financial Resource Strain: Not on file   Food Insecurity: Not on file   Transportation Needs: Not on file   Physical Activity: Not on file   Stress: Not on file   Social Connections: Unknown (12/11/2023)    [facility]    Social Connections     Frequency of Communication with Friends and Family: Not on file   Interpersonal Safety: Not on file   Housing Stability: Not on file       No family history on file.  Family history reviewed and edited as appropriate    Medications and Allergies:     Outpatient Encounter Medications as of 11/19/2024   Medication Sig Dispense Refill    albuterol (PROAIR HFA/PROVENTIL HFA/VENTOLIN HFA) 108 (90 Base) MCG/ACT inhaler Inhale 2 puffs into the lungs every 6 hours as needed for shortness of breath, wheezing or cough. 18 g 11    azelastine (ASTELIN) 0.1 % nasal spray Spray 1 spray into both nostrils at bedtime. 30 mL 11    citalopram (CELEXA) 40 MG tablet Take 40 mg by mouth daily      EPINEPHrine (ANY BX GENERIC EQUIV) 0.3 MG/0.3ML injection 2-pack Inject into outer thigh for allergic reaction 4 each 0    fexofenadine (ALLEGRA) 180 MG tablet Take 1 tablet (180 mg) by mouth daily. 90 tablet 5     fluticasone (FLONASE) 50 MCG/ACT nasal spray Spray 2 sprays into both nostrils daily as needed for rhinitis or allergies. 18.2 mL 4    Fluticasone-Umeclidin-Vilanterol (TRELEGY ELLIPTA) 200-62.5-25 MCG/ACT oral inhaler Inhale 1 puff into the lungs daily. 28 each 11    ipratropium (ATROVENT) 0.03 % nasal spray INSTILL 2 SPRAYS INTO BOTH NOSTRILS 3 TIMES DAILY. 30 mL 1    ipratropium - albuterol 0.5 mg/2.5 mg/3 mL (DUONEB) 0.5-2.5 (3) MG/3ML neb solution Take 1 vial (3 mLs) by nebulization every 6 hours as needed for shortness of breath / dyspnea or wheezing 420 mL 1    Multiple Vitamin (MULTIVITAMIN ADULT PO) Take 1 tablet by mouth daily.      Omega-3 Fatty Acids (FISH OIL) 1200 MG capsule Take 1,200 mg by mouth daily.      ORDER FOR ALLERGEN IMMUNOTHERAPY Vaccine A MOLD/ INDOORALLERGENS/ RAGWEED  MM Mold Mix Alternaria, Aspergilus, Cladosporium, Penicillium 1:10 w/v, 1.0 ml  DFM Df Mite D farinae 10,000 AU, 0.5 ml  DPM Dp Mite D pteronyssinus. 10,000 AU, 0.5 ml  DOG AP Dog hair & dander, mixed breeds 1:10 w/v, 0.5 ml  Vaccine B POLLENS/ CAT  G GRASS MIX Kentucky Blue, Orchard, Redtop, Marco A 100, 000 BAU 0.3 ml  ELM Elm, American Ulmus Americana 1:20 w/v, 0.5 ml  MAP Maple, Hard/Sugar Acer saccharum 1:20 w/v, 0.5 ml  LQ Lamb's Quarters Chenopodium album 1:20 w/v, 0.5 ml  CAT Cat Hair 10,000 BAU 2.0 ml  TRUNG Trung, White Fraxinus Americana 1:20 w/v, 0.5 ml  Dilutions: None (red) Frequency: weekly  1/10 (yellow) Frequency: weekly  1/100 (blue) Frequency: weekly  1/1000 (green) Frequency: weekly      Diluent: HSA qs to 5ml 5 mL 11    rosuvastatin (CRESTOR) 10 MG tablet Take 10 mg by mouth daily       No facility-administered encounter medications on file as of 11/19/2024.        Allergies   Allergen Reactions    Articaine Swelling    Sulfamethoxazole-Trimethoprim Diarrhea        Review of systems:  A full 10 point review of systems was obtained and was negative except for the pertinent positives and negatives stated  "within the HPI.    Objective Findings:   Physical Exam:    Constitutional: Ht 1.702 m (5' 7\")   Wt 74.8 kg (165 lb)   BMI 25.84 kg/m    General: Alert, cooperative, no distress, well-appearing  Head: Atraumatic, normocephalic, no obvious abnormalities   Eyes: Sclera anicteric, no obvious conjunctival hemorrhage   Nose: Nares normal, no obvious malformation, no obvious rhinorrhea   Respiratory: Resting comfortably, no apparent distress, no cough.   Skin: No jaundice, no obvious rash  Neurologic: AAOx3, no obvious neurologic abnormality  Psychiatric: Normal Affect, appropriate mood  Extremities: No obvious edema, no obvious malformation     Labs, Radiology, Pathology     Lab Results   Component Value Date    WBC 6.9 08/29/2024    WBC 11.0 08/22/2022    WBC 7.4 04/25/2022    HGB 13.2 08/29/2024    HGB 13.2 08/22/2022    HGB 13.2 04/25/2022     08/29/2024     08/22/2022     04/25/2022    CHOL 200 (H) 08/22/2024    CHOL 167 08/18/2023    CHOL 172 10/10/2022    TRIG 107 08/22/2024    TRIG 140 08/18/2023    TRIG 79 10/10/2022    HDL 55 08/22/2024    HDL 45 (L) 08/18/2023    HDL 53 10/10/2022    ALT 14 04/25/2022    AST 22 08/17/2022    AST 15 04/25/2022     04/25/2022     07/29/2019    BUN 14 04/25/2022    BUN 10 07/29/2019    CO2 26 04/25/2022    CO2 25 07/29/2019    TSH 0.77 07/29/2019        Liver Function Studies -   Recent Labs   Lab Test 08/17/22  0955 04/25/22  1436   PROTTOTAL  --  7.1   ALBUMIN  --  3.8   BILITOTAL  --  0.4   ALKPHOS  --  83   AST 22 15   ALT  --  14          Patient Active Problem List    Diagnosis Date Noted    Allergic rhinitis caused by mold 01/12/2023     Priority: Medium    Allergic rhinitis due to dust mite 01/12/2023     Priority: Medium    Allergic rhinitis due to animals 01/12/2023     Priority: Medium    Allergic rhinitis due to pollen 06/13/2022     Priority: Medium    Constipation by delayed colonic transit 06/13/2022     Priority: Medium    " Excessive and frequent menstruation 06/13/2022     Priority: Medium    Generalized anxiety disorder 06/13/2022     Priority: Medium    Moderate persistent asthma without complication 06/13/2022     Priority: Medium    Recurrent major depression in full remission (H) 06/13/2022     Priority: Medium      Assessment and Plan   Assessment/Plan:    Lesley Napoles is a 54 year old female with significant past medical history pertinent for anxiety, depression, asthma, environmental allergies, chronic rhinosinusitis, hyper IgE who is presenting as a new patient in consultation at the request of Ольга Beatty NP for chronic cough and GERD with a chief complaint of chronic cough.    #Chronic Cough     10/29/2024 patient was seen by pulmonology for severe persistent asthma, environmental allergies, hyper IgE, chronic rhinosinusitis and groundglass opacities.    Today Lesley presents with an approximate 3-year history of chronic cough which is occurring sporadically throughout the day without relation to oral intake.  Triggers include laying down flat.  Associated symptoms include PND.  She has a pertinent past medical history for multiple atopic disorders as listed above.  Prior evaluation is pertinent for a standard barium esophagram without tablet notable for a small sliding hiatal hernia and moderate to severe spontaneous reflux.     As she presents with extra esophageal manifestations of reflux objective pH monitoring was recommended for which Lesley is agreeable to today. With her strong history of atopy biopsies will also be obtained for eosinophilic esophagitis.  Additional differential includes esophageal dysmotility however clinical suspicion is low based on her presenting symptoms.    - Upper endoscopic evaluation +/- dilation with 96 hour BRAVO study to be completed off acid suppressive therapy.  Please hold any over-the-counter acid suppressive regimen/antacids for a minimum of 2 weeks prior to the  procedure  - Results from the above procedures have been taking approximately 6 to 8 weeks to be finalized.  Please follow-up in clinic accordingly    #Colorectal Cancer Screening   Cologuard negative 9/7/2024. No family history of CRC. Per the most recent update by the [place] Multi-Society Task Force on Colorectal Cancer recall should be 3 years, 2027 or sooner if otherwise indicated.       Follow up plan:   Return to clinic 4 months and as needed.    The risks and benefits of my recommendations, as well as other treatment options were discussed with the patient and any available family today. All questions were answered.     Follow up: As planned above. Today, I personally spent 18 minutes in direct face to face time with the patient, of which greater than 50% of the time was spent in patient education and counseling as described above. Approximately 8 minutes were spent on indirect care associated with the patient's consultation including but not limited to review of: patient medical records to date, clinic visits, hospital records, lab results, imaging studies, procedural documentation, and coordinating care with other providers. The findings from this review are summarized in the above note. All of the above accounted for a cumulative time of 26 minutes and was performed on the date of service.     The patient verbalized understanding of the plan and was appreciative for the time spent and information provided during the office visit.           Patti Martínez PA-C  Division of Gastroenterology, Hepatology, and Nutrition  [facility]       Documentation assisted by voice recognition and documentation system.

## 2024-11-19 NOTE — NURSING NOTE
Current patient location: Patient declined to provide     Is the patient currently in the state of [place]? YES    Visit mode:VIDEO    If the visit is dropped, the patient can be reconnected by:VIDEO VISIT: Text to cell phone:   Telephone Information:   Mobile [phone]       Will anyone else be joining the visit? NO  (If patient encounters technical issues they should call [phone]    Are changes needed to the allergy or medication list? No    Are refills needed on medications prescribed by this physician? NO    Rooming Documentation:  Questionnaire(s) not pre-assigned    Reason for visit: Consult (Cough)    Rachael STYLES

## 2024-11-20 NOTE — TELEPHONE ENCOUNTER
Prior Authorization Approval    Authorization Effective Date: 11/19/2024  Authorization Expiration Date: 11/19/2025  Medication: Trelegy ellipta inhaler [id number]  Reference #:     Insurance Company: [employer] - Phone [phone] Fax [phone]  Which Pharmacy is filling the prescription (Not needed for infusion/clinic administered): [facility] [id number] [place] [address]  Pharmacy Notified: Yes  Patient Notified: Instructed pharmacy to notify patient when script is ready to /ship.

## 2024-11-23 ASSESSMENT — ASTHMA QUESTIONNAIRES
QUESTION_4 LAST FOUR WEEKS HOW OFTEN HAVE YOU USED YOUR RESCUE INHALER OR NEBULIZER MEDICATION (SUCH AS ALBUTEROL): ONCE A WEEK OR LESS
ACT_TOTALSCORE: 24
QUESTION_5 LAST FOUR WEEKS HOW WOULD YOU RATE YOUR ASTHMA CONTROL: COMPLETELY CONTROLLED
QUESTION_2 LAST FOUR WEEKS HOW OFTEN HAVE YOU HAD SHORTNESS OF BREATH: NOT AT ALL
QUESTION_1 LAST FOUR WEEKS HOW MUCH OF THE TIME DID YOUR ASTHMA KEEP YOU FROM GETTING AS MUCH DONE AT WORK, SCHOOL OR AT HOME: NONE OF THE TIME
QUESTION_3 LAST FOUR WEEKS HOW OFTEN DID YOUR ASTHMA SYMPTOMS (WHEEZING, COUGHING, SHORTNESS OF BREATH, CHEST TIGHTNESS OR PAIN) WAKE YOU UP AT NIGHT OR EARLIER THAN USUAL IN THE MORNING: NOT AT ALL
ACT_TOTALSCORE: 24

## 2024-11-26 ENCOUNTER — MEDICAL CORRESPONDENCE (OUTPATIENT)
Dept: HEALTH INFORMATION MANAGEMENT | Facility: CLINIC | Age: 54
End: 2024-11-26

## 2024-11-26 ENCOUNTER — LAB REQUISITION (OUTPATIENT)
Dept: LAB | Facility: CLINIC | Age: 54
End: 2024-11-26
Payer: COMMERCIAL

## 2024-11-26 DIAGNOSIS — J32.8 OTHER CHRONIC SINUSITIS: ICD-10-CM

## 2024-11-27 ENCOUNTER — VIRTUAL VISIT (OUTPATIENT)
Dept: PULMONOLOGY | Facility: CLINIC | Age: 54
End: 2024-11-27
Payer: COMMERCIAL

## 2024-11-27 VITALS — BODY MASS INDEX: 26.21 KG/M2 | HEIGHT: 67 IN | WEIGHT: 167 LBS

## 2024-11-27 DIAGNOSIS — R91.8 OPACITY OF LUNG ON IMAGING STUDY: ICD-10-CM

## 2024-11-27 DIAGNOSIS — R05.3 CHRONIC COUGH: ICD-10-CM

## 2024-11-27 DIAGNOSIS — J45.50 SEVERE PERSISTENT ASTHMA WITHOUT COMPLICATION (H): Primary | ICD-10-CM

## 2024-11-27 DIAGNOSIS — K21.9 GASTROESOPHAGEAL REFLUX DISEASE WITHOUT ESOPHAGITIS: ICD-10-CM

## 2024-11-27 DIAGNOSIS — J32.9 CHRONIC RHINOSINUSITIS: ICD-10-CM

## 2024-11-27 DIAGNOSIS — Z91.09 ENVIRONMENTAL ALLERGIES: ICD-10-CM

## 2024-11-27 ASSESSMENT — PAIN SCALES - GENERAL: PAINLEVEL_OUTOF10: NO PAIN (0)

## 2024-11-27 NOTE — PATIENT INSTRUCTIONS
Continue appts with [facility], GI, and rheumatology.  Chest CT in January.  Continue Trelegy one puff daily, rinse/gargle after use.   Continue Duonebs or Albuterol every 4-6 hours as needed for shortness of breath or wheezing.  Call my nurse, Jarrod ([phone]) with any change or worsening of your breathing.  Follow-up in January as scheduled.     Ольга Beatty, CNP  Pulmonary Medicine  [facility]  [phone]    Patient aware

## 2024-11-27 NOTE — NURSING NOTE
Current patient location: [address] [place] [id number]      Is the patient currently in the state of [place]? YES    Visit mode:TELEPHONE    If the visit is dropped, the patient can be reconnected by: TELEPHONE VISIT: Phone number:   Telephone Information:   Mobile [phone]       Will anyone else be joining the visit? NO  (If patient encounters technical issues they should call [phone])    How would you like to obtain your AVS? MyChart    Are changes needed to the allergy or medication list? Pt stated no changes to allergies and Pt stated no med changes    Are refills needed on medications prescribed by this physician? NO    Rooming Documentation:  Questionnaire(s) completed    Reason for visit: RECHERIC STYLES

## 2024-11-27 NOTE — PROGRESS NOTES
Virtual Visit Details    Type of service:  Telephone Visit   Phone call duration: 6 minutes   Originating Location (pt. Location): Home    Distant Location (provider location):  On-site     Pulmonary Clinic Follow-Up          Assessment/Plan:     54 year old female with a history of lifelong asthma, multiple allergies, hyper-IgE, chronic rhinosinusitis s/p FESS, presenting for follow-up    Severe persistent asthma  Environmental allergies  Hyper-IgE  Chronic rhinosinusitis  Ground glass opacities  Lifelong asthma with multiple allergies, previously followed by Dr Rod in Allergy clinic and received immunotherapy x1.5 years.  She has required duplicate ICS, although she noted no benefit from cough.  Sinus surgery through [facility] in past.  Has had migrating ground glass nodular opacities and thickened airways. Possible focal areas of , though unclear.  Esophagitis also noted on this CT scan in 8/2022.  VSS without aspiration.  Broadly positive mid[place] respiratory allergen IgE panel and total IgE 560 kU/L in August 2022. She has stopped Singulair, no noted worsening - she is unsure if this was ever beneficial.  She is no longer receiving immunotherapy, she did not find benefit.  She continues to cough.  Of note, she is clearing her throat and sniffling throughout exam.  FeNO: 21  Esophagram 9/2024 with moderate-severe gastroesophageal reflux and small hiatal hernia.  Chest CT 9/12/24 with bronchiectasis, mucous plugging, tree-in-bud opacities in RUL, HARMAN and bilateral LLs.  As well as 9mm HARMAN nodule.  Eosinophils 0.5 and IgE 1088 on 8/29/24.  Sputum culture was ordered but she has not been able to produce sample yet.   She saw GI who has ordered EGD with 96 hr BRAVO study and biopsies to check for eosinophils.  She is now followed in [facility], had positive MPO so work-up has started for vasculitis.   Had repeat sinus CT yesterday, starting budesonide rinses and then sinus surgery.    Plan:  -  continue following with [facility] for sinus, allergy, and asthma.  Plan for sinus surgery.  - continue following with GI, upcoming EGD + BRAVO study and biopsies.  - establish care with rheumatology for vasculitis work-up.  Dec 17.   - chest CT in January to follow-up on nodule and opacities, may need bronchoscopy in future.   - continue Trelegy Ellipta (fluticasone/umeclidinium/vilanterol) 200/62/5/25, one inhalation daily, rinse/gargle after use.  - continue Duonebs for pulmonary hygiene, one-three times daily.  - she is UTD with annual influenza vaccine, and prevnar 20.  - Action plan in case of sinus or lower respiratory exacerbation: prednisone 40 mg daily for 7 days.    Follow-up  - January, after chest CT    Ольга Beatty, CNP  Pulmonary Medicine  [facility]  [phone]       CC:     Asthma follow-up     HPI:     54 year old female with a history of lifelong asthma, multiple allergies, hyper-IgE, chronic rhinosinusitis s/p FESS, presenting for follow-up.    Had repeat sinus CT and follow-up visits with [facility] yesterday.  Continues on Trelegy.  Rheumatology on 12/17.            8/24/2024    12:12 PM 10/29/2024     8:00 AM 11/23/2024    12:59 AM   ACT Total Scores   ACT TOTAL SCORE (Goal Greater than or Equal to 20) 21 22 24    In the past 12 months, how many times did you visit the emergency room for your asthma without being admitted to the hospital? 0  0 0    In the past 12 months, how many times were you hospitalized overnight because of your asthma? 0  0 0        Patient-reported        ROS:     6-point ROS performed and is negative aside from those listed in HPI.     Medical history:       PMH:  lifelong asthma  multiple allergies currently undergoing immunotherapy in allergy clinic  hyper-IgE  chronic rhinosinusitis s/p FESS    PSH:  No past surgical history on file.    Allergies:  Allergies   Allergen Reactions    Articaine Swelling     Sulfamethoxazole-Trimethoprim Diarrhea       Family HX:  No family history on file.    Social Hx:  Social History     Socioeconomic History    Marital status: Single     Spouse name: Not on file    Number of children: Not on file    Years of education: Not on file    Highest education level: Not on file   Occupational History    Not on file   Tobacco Use    Smoking status: Never    Smokeless tobacco: Never   Vaping Use    Vaping status: Never Used   Substance and Sexual Activity    Alcohol use: Not on file    Drug use: Not on file    Sexual activity: Not on file   Other Topics Concern    Not on file   Social History Narrative    Not on file     Social Drivers of Health     Financial Resource Strain: Not on file   Food Insecurity: Not on file   Transportation Needs: Not on file   Physical Activity: Not on file   Stress: Not on file   Social Connections: Unknown (12/11/2023)    [facility]    Social Connections     Frequency of Communication with Friends and Family: Not on file   Interpersonal Safety: Not on file   Housing Stability: Not on file       Current Meds:  Current Outpatient Medications   Medication Sig Dispense Refill    albuterol (PROAIR HFA/PROVENTIL HFA/VENTOLIN HFA) 108 (90 Base) MCG/ACT inhaler Inhale 2 puffs into the lungs every 6 hours as needed for shortness of breath, wheezing or cough. 18 g 11    azelastine (ASTELIN) 0.1 % nasal spray Spray 1 spray into both nostrils at bedtime. 30 mL 11    citalopram (CELEXA) 40 MG tablet Take 40 mg by mouth daily      EPINEPHrine (ANY BX GENERIC EQUIV) 0.3 MG/0.3ML injection 2-pack Inject into outer thigh for allergic reaction 4 each 0    fexofenadine (ALLEGRA) 180 MG tablet Take 1 tablet (180 mg) by mouth daily. 90 tablet 5    fluticasone (FLONASE) 50 MCG/ACT nasal spray Spray 2 sprays into both nostrils daily as needed for rhinitis or allergies. 18.2 mL 4     Fluticasone-Umeclidin-Vilanterol (TRELEGY ELLIPTA) 200-62.5-25 MCG/ACT oral inhaler Inhale 1 puff into the lungs daily. 28 each 3    ipratropium (ATROVENT) 0.03 % nasal spray INSTILL 2 SPRAYS INTO BOTH NOSTRILS 3 TIMES DAILY. 30 mL 1    ipratropium - albuterol 0.5 mg/2.5 mg/3 mL (DUONEB) 0.5-2.5 (3) MG/3ML neb solution Take 1 vial (3 mLs) by nebulization every 6 hours as needed for shortness of breath / dyspnea or wheezing 420 mL 1    Multiple Vitamin (MULTIVITAMIN ADULT PO) Take 1 tablet by mouth daily.      Omega-3 Fatty Acids (FISH OIL) 1200 MG capsule Take 1,200 mg by mouth daily.      ORDER FOR ALLERGEN IMMUNOTHERAPY Vaccine A MOLD/ INDOORALLERGENS/ RAGWEED  MM Mold Mix Alternaria, Aspergilus, Cladosporium, Penicillium 1:10 w/v, 1.0 ml  DFM Df Mite D farinae 10,000 AU, 0.5 ml  DPM Dp Mite D pteronyssinus. 10,000 AU, 0.5 ml  DOG AP Dog hair & dander, mixed breeds 1:10 w/v, 0.5 ml  Vaccine B POLLENS/ CAT  G GRASS MIX [place] Blue, Orchard, Redtop, Marco A 100, 000 BAU 0.3 ml  ELM Elm, American Ulmus Americana 1:20 w/v, 0.5 ml  MAP Maple, Hard/Sugar Acer saccharum 1:20 w/v, 0.5 ml  LQ Lamb's Quarters Chenopodium album 1:20 w/v, 0.5 ml  CAT Cat Hair 10,000 BAU 2.0 ml  TRUNG Trung, White Fraxinus Americana 1:20 w/v, 0.5 ml  Dilutions: None (red) Frequency: weekly  1/10 (yellow) Frequency: weekly  1/100 (blue) Frequency: weekly  1/1000 (green) Frequency: weekly      Diluent: HSA qs to 5ml 5 mL 11    rosuvastatin (CRESTOR) 10 MG tablet Take 10 mg by mouth daily          Physical Exam:     None for telephone visit     Data:       Labs:  reviewed    Imaging studies:    Chest CT 9/12/24:  IMPRESSION:   1.  Lower lung predominant bronchiectasis with diffuse tree-in-bud opacities, suggesting infectious or inflammatory bronchiolitis. Atypical infection such as mycobacterial avium complex (MAC) could have this appearance.  2.  9 mm solid nodule in the left upper lobe, favored to be infectious/inflammatory however recommend  follow-up CT chest in 3-6 months to reassess.      Chest CT (August 2022):                                                        IMPRESSION:   1.  Resolution of the mixed attenuation opacity in the posterior apical left upper lobe present 04/21/2022 consistent with an area of airspace inflammation/infection. New, smaller foci of groundglass inflammation are present posterior medial left upper   lobe and in the medial right apex consistent with inflammatory nodules.  2.  Extensive airway wall thickening which conform to a posterior/dependent distribution including endoluminal material in subsegmental airways in the lower lobes and right middle lobe. The pattern is suspect for bronchopulmonary aspiration.  3.  Multiple enlarged hilar, subcarinal and mediastinal lymph nodes. Presumably reactive in the setting of #2.  4.  Mild wall thickening of the distal esophagus suggesting esophagitis.  5.  Subpleural 4 x 6 mm nodule in the anterior left upper lobe and solid subpleural 3 to 4 mm pulmonary nodule in the posterior right upper lobe have been stable for 4 months favoring benign nodules.  6.  Heterogeneous 2.1 x 2.2 cm right thyroid nodule. If none previous evaluated, thyroid ultrasound is recommended.      Pulmonary Function Testing  May 2022  FVC 3.98 (104%)  FEV1 2.76 (90%)  FEV1/FVC 0.69 (LLN 0.69)  No bronchodilator response  RV 3.17 (168%)  TLC 7.17 (131%)  DLCOc 99%

## 2024-11-28 LAB
BACTERIA SPEC CULT: ABNORMAL

## 2024-12-02 ENCOUNTER — TELEPHONE (OUTPATIENT)
Dept: GASTROENTEROLOGY | Facility: CLINIC | Age: 54
End: 2024-12-02
Payer: COMMERCIAL

## 2024-12-03 ENCOUNTER — TELEPHONE (OUTPATIENT)
Dept: GASTROENTEROLOGY | Facility: CLINIC | Age: 54
End: 2024-12-03
Payer: COMMERCIAL

## 2024-12-03 NOTE — TELEPHONE ENCOUNTER
"Endoscopy Scheduling Screen    Have you had any respiratory illness or flu-like symptoms in the last 10 days?  No    What is your communication preference for Instructions and/or Bowel Prep?   MyChart    What insurance is in the chart?  Other:  [facility]    Ordering/Referring Provider:     GRACE CORDOBA      (If ordering provider performs procedure, schedule with ordering provider unless otherwise instructed. )    BMI: Estimated body mass index is 26.16 kg/m  as calculated from the following:    Height as of 11/27/24: 1.702 m (5' 7\").    Weight as of 11/27/24: 75.8 kg (167 lb).     Sedation Ordered  moderate sedation.   If patient BMI > 50 do not schedule in [facility].    If patient BMI > 45 do not schedule at [facility].    Are you taking methadone or Suboxone?  NO, No RN review required.    Have you been diagnosed and are being treated for severe PTSD or severe anxiety?  NO, No RN review required.    Are you taking any prescription medications for pain 3 or more times per week?   NO, No RN review required.    Do you have a history of malignant hyperthermia?  No    (Females) Are you currently pregnant?        Have you been diagnosed or told you have pulmonary hypertension?   No    Do you have an LVAD?  No    Have you been told you have moderate to severe sleep apnea?  No.    Have you been told you have COPD, asthma, or any other lung disease?  Yes     What breathing problems do you have?  Asthma     Do you use home oxygen?  No    Have your breathing problems required an ED visit or hospitalization in the last year?  No.    Do you have any heart conditions?  No     Have you ever had or are you waiting for an organ transplant?  No. Continue scheduling, no site restrictions.    Have you had a stroke or transient ischemic attack (TIA aka \"mini stroke\" in the last 6 months?   No    Have you been diagnosed with or been told you have cirrhosis of the liver?   No.    Are you currently on dialysis?   No    Do you need assistance " "transferring?   No    BMI: Estimated body mass index is 26.16 kg/m  as calculated from the following:    Height as of 11/27/24: 1.702 m (5' 7\").    Weight as of 11/27/24: 75.8 kg (167 lb).     Is patients BMI > 40 and scheduling location [facility]?  No    Do you take an injectable or oral medication for weight loss or diabetes (excluding insulin)?  No    Do you take the medication Naltrexone?  No    Do you take blood thinners?  No       Prep   Are you currently on dialysis or do you have chronic kidney disease?  No    Do you have a diagnosis of diabetes?  No    Do you have a diagnosis of cystic fibrosis (CF)?  No    On a regular basis do you go 3 -5 days between bowel movements?  NO    BMI > 40?  NO    Preferred Pharmacy:    [id number] [place] - [address]  [address]  [place] [address]  Phone: [phone] Fax: [phone]      Final Scheduling Details     Procedure scheduled  Upper endoscopy (EGD)    Surgeon:  JOCE    Date of procedure:  4/8     Pre-OP / PAC:   No - Not required for this site.    Location  [facility] - Per RN assessment.    Sedation   MAC/Deep Sedation - Per RN assessment.      Patient Reminders:   You will receive a call from a Nurse to review instructions and health history.  This assessment must be completed prior to your procedure.  Failure to complete the Nurse assessment may result in the procedure being cancelled.      On the day of your procedure, please designate an adult(s) who can drive you home stay with you for the next 24 hours. The medicines used in the exam will make you sleepy. You will not be able to drive.      You cannot take public transportation, ride share services, or non-medical taxi service without a responsible caregiver.  Medical transport services are allowed with the requirement that a responsible caregiver will receive you at your destination.  We require that drivers and caregivers are confirmed prior to your procedure.  "

## 2024-12-04 NOTE — TELEPHONE ENCOUNTER
Left Voicemail (2nd Attempt) and Sent Mychart (2nd Attempt) for the patient to call back and schedule the following:    Appointment type: Return [facility]  Provider: Patti Martínez  Return date: after procedure on 4/8/25  Specialty phone number: [phone]  Additional appointment(s) needed:   Additonal Notes: LVMx2/MyC x2

## 2024-12-11 ENCOUNTER — TRANSFERRED RECORDS (OUTPATIENT)
Dept: HEALTH INFORMATION MANAGEMENT | Facility: CLINIC | Age: 54
End: 2024-12-11
Payer: COMMERCIAL

## 2025-01-23 ENCOUNTER — HOSPITAL ENCOUNTER (INPATIENT)
Facility: HOSPITAL | Age: 55
End: 2025-01-23
Attending: STUDENT IN AN ORGANIZED HEALTH CARE EDUCATION/TRAINING PROGRAM | Admitting: INTERNAL MEDICINE
Payer: COMMERCIAL

## 2025-01-23 ENCOUNTER — APPOINTMENT (OUTPATIENT)
Dept: CT IMAGING | Facility: HOSPITAL | Age: 55
End: 2025-01-23
Payer: COMMERCIAL

## 2025-01-23 ENCOUNTER — LAB REQUISITION (OUTPATIENT)
Dept: LAB | Facility: CLINIC | Age: 55
End: 2025-01-23

## 2025-01-23 VITALS
SYSTOLIC BLOOD PRESSURE: 117 MMHG | WEIGHT: 164.8 LBS | HEIGHT: 67 IN | OXYGEN SATURATION: 95 % | DIASTOLIC BLOOD PRESSURE: 70 MMHG | BODY MASS INDEX: 25.87 KG/M2 | RESPIRATION RATE: 18 BRPM | HEART RATE: 75 BPM | TEMPERATURE: 98.5 F

## 2025-01-23 DIAGNOSIS — K62.5 RECTAL BLEEDING: ICD-10-CM

## 2025-01-23 DIAGNOSIS — K92.1 MELENA: ICD-10-CM

## 2025-01-23 DIAGNOSIS — D62 ANEMIA DUE TO BLOOD LOSS, ACUTE: ICD-10-CM

## 2025-01-23 DIAGNOSIS — K59.01 CONSTIPATION BY DELAYED COLONIC TRANSIT: ICD-10-CM

## 2025-01-23 DIAGNOSIS — D50.9 IRON DEFICIENCY ANEMIA, UNSPECIFIED IRON DEFICIENCY ANEMIA TYPE: Primary | ICD-10-CM

## 2025-01-23 DIAGNOSIS — Z79.52 CURRENT CHRONIC USE OF SYSTEMIC STEROIDS: ICD-10-CM

## 2025-01-23 LAB
ABO + RH BLD: NORMAL
ALBUMIN SERPL BCG-MCNC: 3.7 G/DL (ref 3.5–5.2)
ALP SERPL-CCNC: 89 U/L (ref 40–150)
ALT SERPL W P-5'-P-CCNC: 12 U/L (ref 0–50)
ANION GAP SERPL CALCULATED.3IONS-SCNC: 11 MMOL/L (ref 7–15)
APTT PPP: 21 SECONDS (ref 22–38)
AST SERPL W P-5'-P-CCNC: 10 U/L (ref 0–45)
BILIRUB SERPL-MCNC: <0.2 MG/DL
BLD GP AB SCN SERPL QL: NEGATIVE
BUN SERPL-MCNC: 21.7 MG/DL (ref 6–20)
CALCIUM SERPL-MCNC: 8.5 MG/DL (ref 8.8–10.4)
CHLORIDE SERPL-SCNC: 104 MMOL/L (ref 98–107)
CREAT SERPL-MCNC: 0.64 MG/DL (ref 0.51–0.95)
CRP SERPL-MCNC: <3 MG/L
EGFRCR SERPLBLD CKD-EPI 2021: >90 ML/MIN/1.73M2
ERYTHROCYTE [DISTWIDTH] IN BLOOD BY AUTOMATED COUNT: 14.3 % (ref 10–15)
ERYTHROCYTE [SEDIMENTATION RATE] IN BLOOD BY WESTERGREN METHOD: 10 MM/HR (ref 0–30)
GLUCOSE BLDC GLUCOMTR-MCNC: 114 MG/DL (ref 70–99)
GLUCOSE SERPL-MCNC: 129 MG/DL (ref 70–99)
HCO3 SERPL-SCNC: 23 MMOL/L (ref 22–29)
HCT VFR BLD AUTO: 27.3 % (ref 35–47)
HGB BLD-MCNC: 7.5 G/DL (ref 11.7–15.7)
HGB BLD-MCNC: 8.7 G/DL (ref 11.7–15.7)
INR PPP: 1.02 (ref 0.85–1.15)
MCH RBC QN AUTO: 28.6 PG (ref 26.5–33)
MCHC RBC AUTO-ENTMCNC: 31.9 G/DL (ref 31.5–36.5)
MCV RBC AUTO: 90 FL (ref 78–100)
PLATELET # BLD AUTO: 279 10E3/UL (ref 150–450)
POTASSIUM SERPL-SCNC: 3.9 MMOL/L (ref 3.4–5.3)
PROT SERPL-MCNC: 6.1 G/DL (ref 6.4–8.3)
RBC # BLD AUTO: 3.04 10E6/UL (ref 3.8–5.2)
SODIUM SERPL-SCNC: 138 MMOL/L (ref 135–145)
SPECIMEN EXP DATE BLD: NORMAL
WBC # BLD AUTO: 14.1 10E3/UL (ref 4–11)

## 2025-01-23 PROCEDURE — 86923 COMPATIBILITY TEST ELECTRIC: CPT | Performed by: STUDENT IN AN ORGANIZED HEALTH CARE EDUCATION/TRAINING PROGRAM

## 2025-01-23 PROCEDURE — 99285 EMERGENCY DEPT VISIT HI MDM: CPT | Mod: 25

## 2025-01-23 PROCEDURE — 86900 BLOOD TYPING SEROLOGIC ABO: CPT

## 2025-01-23 PROCEDURE — 85610 PROTHROMBIN TIME: CPT

## 2025-01-23 PROCEDURE — 99207 PR APP CREDIT; MD BILLING SHARED VISIT: CPT

## 2025-01-23 PROCEDURE — 85018 HEMOGLOBIN: CPT | Performed by: INTERNAL MEDICINE

## 2025-01-23 PROCEDURE — 84520 ASSAY OF UREA NITROGEN: CPT

## 2025-01-23 PROCEDURE — 36415 COLL VENOUS BLD VENIPUNCTURE: CPT | Performed by: INTERNAL MEDICINE

## 2025-01-23 PROCEDURE — 85027 COMPLETE CBC AUTOMATED: CPT

## 2025-01-23 PROCEDURE — 258N000003 HC RX IP 258 OP 636

## 2025-01-23 PROCEDURE — 250N000013 HC RX MED GY IP 250 OP 250 PS 637

## 2025-01-23 PROCEDURE — 250N000011 HC RX IP 250 OP 636

## 2025-01-23 PROCEDURE — 86850 RBC ANTIBODY SCREEN: CPT

## 2025-01-23 PROCEDURE — 74174 CTA ABD&PLVS W/CONTRAST: CPT

## 2025-01-23 PROCEDURE — 99223 1ST HOSP IP/OBS HIGH 75: CPT | Performed by: INTERNAL MEDICINE

## 2025-01-23 PROCEDURE — 82310 ASSAY OF CALCIUM: CPT

## 2025-01-23 PROCEDURE — 85730 THROMBOPLASTIN TIME PARTIAL: CPT

## 2025-01-23 PROCEDURE — 82947 ASSAY GLUCOSE BLOOD QUANT: CPT

## 2025-01-23 PROCEDURE — 86140 C-REACTIVE PROTEIN: CPT | Performed by: FAMILY MEDICINE

## 2025-01-23 PROCEDURE — 250N000009 HC RX 250

## 2025-01-23 PROCEDURE — 120N000001 HC R&B MED SURG/OB

## 2025-01-23 PROCEDURE — 36415 COLL VENOUS BLD VENIPUNCTURE: CPT

## 2025-01-23 PROCEDURE — 85652 RBC SED RATE AUTOMATED: CPT | Performed by: FAMILY MEDICINE

## 2025-01-23 RX ORDER — ONDANSETRON 4 MG/1
4 TABLET, ORALLY DISINTEGRATING ORAL EVERY 6 HOURS PRN
Status: DISCONTINUED | OUTPATIENT
Start: 2025-01-23 | End: 2025-01-25 | Stop reason: HOSPADM

## 2025-01-23 RX ORDER — POLYETHYLENE GLYCOL 3350 17 G/17G
238 POWDER, FOR SOLUTION ORAL ONCE
Status: COMPLETED | OUTPATIENT
Start: 2025-01-23 | End: 2025-01-23

## 2025-01-23 RX ORDER — MAGNESIUM CARB/ALUMINUM HYDROX 105-160MG
296 TABLET,CHEWABLE ORAL ONCE
Status: COMPLETED | OUTPATIENT
Start: 2025-01-24 | End: 2025-01-24

## 2025-01-23 RX ORDER — FLUTICASONE PROPIONATE 50 MCG
2 SPRAY, SUSPENSION (ML) NASAL DAILY PRN
Status: DISCONTINUED | OUTPATIENT
Start: 2025-01-23 | End: 2025-01-25 | Stop reason: HOSPADM

## 2025-01-23 RX ORDER — PREDNISONE 10 MG/1
TABLET ORAL DAILY
COMMUNITY
Start: 2025-01-15 | End: 2025-01-27

## 2025-01-23 RX ORDER — SODIUM CHLORIDE 9 MG/ML
INJECTION, SOLUTION INTRAVENOUS CONTINUOUS
Status: DISCONTINUED | OUTPATIENT
Start: 2025-01-23 | End: 2025-01-24

## 2025-01-23 RX ORDER — CITALOPRAM HYDROBROMIDE 40 MG/1
40 TABLET ORAL DAILY
Status: DISCONTINUED | OUTPATIENT
Start: 2025-01-24 | End: 2025-01-24

## 2025-01-23 RX ORDER — ALBUTEROL SULFATE 90 UG/1
2 INHALANT RESPIRATORY (INHALATION) EVERY 6 HOURS PRN
Status: DISCONTINUED | OUTPATIENT
Start: 2025-01-23 | End: 2025-01-25 | Stop reason: HOSPADM

## 2025-01-23 RX ORDER — FLUTICASONE FUROATE AND VILANTEROL 100; 25 UG/1; UG/1
1 POWDER RESPIRATORY (INHALATION) DAILY
Status: DISCONTINUED | OUTPATIENT
Start: 2025-01-24 | End: 2025-01-24

## 2025-01-23 RX ORDER — BISACODYL 5 MG
10 TABLET, DELAYED RELEASE (ENTERIC COATED) ORAL ONCE
Status: COMPLETED | OUTPATIENT
Start: 2025-01-23 | End: 2025-01-23

## 2025-01-23 RX ORDER — BUDESONIDE 0.5 MG/2ML
0.5 INHALANT ORAL 2 TIMES DAILY
Status: DISCONTINUED | OUTPATIENT
Start: 2025-01-23 | End: 2025-01-24

## 2025-01-23 RX ORDER — CALCIUM CARBONATE 500 MG/1
1000 TABLET, CHEWABLE ORAL 4 TIMES DAILY PRN
Status: DISCONTINUED | OUTPATIENT
Start: 2025-01-23 | End: 2025-01-25 | Stop reason: HOSPADM

## 2025-01-23 RX ORDER — ONDANSETRON 2 MG/ML
4 INJECTION INTRAMUSCULAR; INTRAVENOUS EVERY 6 HOURS PRN
Status: DISCONTINUED | OUTPATIENT
Start: 2025-01-23 | End: 2025-01-25 | Stop reason: HOSPADM

## 2025-01-23 RX ORDER — ACETAMINOPHEN 325 MG/1
650 TABLET ORAL EVERY 4 HOURS PRN
Status: DISCONTINUED | OUTPATIENT
Start: 2025-01-23 | End: 2025-01-25 | Stop reason: HOSPADM

## 2025-01-23 RX ORDER — BUDESONIDE 0.5 MG/2ML
0.5 INHALANT ORAL 2 TIMES DAILY
COMMUNITY

## 2025-01-23 RX ORDER — PREDNISONE 5 MG/1
10 TABLET ORAL DAILY
Status: DISCONTINUED | OUTPATIENT
Start: 2025-01-25 | End: 2025-01-25 | Stop reason: HOSPADM

## 2025-01-23 RX ORDER — LIDOCAINE 40 MG/G
CREAM TOPICAL
Status: DISCONTINUED | OUTPATIENT
Start: 2025-01-23 | End: 2025-01-24 | Stop reason: HOSPADM

## 2025-01-23 RX ORDER — ONDANSETRON 2 MG/ML
4 INJECTION INTRAMUSCULAR; INTRAVENOUS
Status: DISCONTINUED | OUTPATIENT
Start: 2025-01-23 | End: 2025-01-24 | Stop reason: HOSPADM

## 2025-01-23 RX ORDER — ACETAMINOPHEN 650 MG/1
650 SUPPOSITORY RECTAL EVERY 4 HOURS PRN
Status: DISCONTINUED | OUTPATIENT
Start: 2025-01-23 | End: 2025-01-25 | Stop reason: HOSPADM

## 2025-01-23 RX ORDER — PREDNISONE 20 MG/1
20 TABLET ORAL DAILY
Status: DISCONTINUED | OUTPATIENT
Start: 2025-01-24 | End: 2025-01-24

## 2025-01-23 RX ORDER — AMOXICILLIN 250 MG
2 CAPSULE ORAL 2 TIMES DAILY PRN
Status: DISCONTINUED | OUTPATIENT
Start: 2025-01-23 | End: 2025-01-25 | Stop reason: HOSPADM

## 2025-01-23 RX ORDER — AMOXICILLIN 250 MG
1 CAPSULE ORAL 2 TIMES DAILY PRN
Status: DISCONTINUED | OUTPATIENT
Start: 2025-01-23 | End: 2025-01-25 | Stop reason: HOSPADM

## 2025-01-23 RX ORDER — FEXOFENADINE HCL 60 MG/1
180 TABLET, FILM COATED ORAL DAILY
Status: DISCONTINUED | OUTPATIENT
Start: 2025-01-24 | End: 2025-01-24

## 2025-01-23 RX ORDER — IOPAMIDOL 755 MG/ML
90 INJECTION, SOLUTION INTRAVASCULAR ONCE
Status: COMPLETED | OUTPATIENT
Start: 2025-01-23 | End: 2025-01-23

## 2025-01-23 RX ORDER — ROSUVASTATIN CALCIUM 10 MG/1
10 TABLET, COATED ORAL DAILY
Status: DISCONTINUED | OUTPATIENT
Start: 2025-01-24 | End: 2025-01-24

## 2025-01-23 RX ADMIN — BISACODYL 10 MG: 5 TABLET, COATED ORAL at 18:59

## 2025-01-23 RX ADMIN — SODIUM CHLORIDE: 9 INJECTION, SOLUTION INTRAVENOUS at 18:42

## 2025-01-23 RX ADMIN — IOPAMIDOL 90 ML: 755 INJECTION, SOLUTION INTRAVENOUS at 14:12

## 2025-01-23 RX ADMIN — PANTOPRAZOLE SODIUM 40 MG: 40 INJECTION, POWDER, FOR SOLUTION INTRAVENOUS at 18:43

## 2025-01-23 RX ADMIN — POLYETHYLENE GLYCOL 3350 238 G: 17 POWDER, FOR SOLUTION ORAL at 19:01

## 2025-01-23 ASSESSMENT — ACTIVITIES OF DAILY LIVING (ADL)
ADLS_ACUITY_SCORE: 41
ADLS_ACUITY_SCORE: 18
WEAR_GLASSES_OR_BLIND: YES
ADLS_ACUITY_SCORE: 18
ADLS_ACUITY_SCORE: 18
ADLS_ACUITY_SCORE: 41
ADLS_ACUITY_SCORE: 18
ADLS_ACUITY_SCORE: 41

## 2025-01-23 ASSESSMENT — COLUMBIA-SUICIDE SEVERITY RATING SCALE - C-SSRS
6. HAVE YOU EVER DONE ANYTHING, STARTED TO DO ANYTHING, OR PREPARED TO DO ANYTHING TO END YOUR LIFE?: NO
2. HAVE YOU ACTUALLY HAD ANY THOUGHTS OF KILLING YOURSELF IN THE PAST MONTH?: NO
1. IN THE PAST MONTH, HAVE YOU WISHED YOU WERE DEAD OR WISHED YOU COULD GO TO SLEEP AND NOT WAKE UP?: NO

## 2025-01-23 NOTE — CONSULTS
[facility] Consultation     Lesley Napoles  [address]  [facility] [address]  54 year old female     Admission Date/Time: 1/23/2025  Primary Care Provider: Theodore Ardon     We were asked to see the patient in consultation by Dr. Vanegas for evaluation of hematochezia.    ASSESSMENT:    Lesley Napoles is a 54 year old female with PMH of rectocele s/p repair, hysterectomy with BSO, asthma, chronic sinusitis with recent sinus surgery on 1/15/2025 who presented to [facility] ED today (1/23/2025) for 2-day history of painless hematochezia.  Patient's sister and daughter were present for today's visit.    # Hematochezia  # Anemia  2-day history of painless hematochezia with maroon-colored stools, drop in hemoglobin from 12.8 (on 1/6/25) to 8.6 today.  No associated abdominal pains or rectal pains.  INR is normal.  She has history of NSAID and prednisone usage which are risk factors for peptic ulcer disease, gastritis, or duodenitis.  CTA negative for active bleeding or inflammatory changes but noted diverticulosis.  Diverticular bleed is on the differential.  Doubtful of ischemic colitis without associated abdominal pains but remains a possibility.  Other possible causes include: acute infections, IBD or other underlying mucosal diseases, hemorrhoidal bleed, or AVMs.  -Patient has never had an upper endoscopy or colonoscopy    RECOMMENDATIONS:  -Plan for EGD and colonoscopy tomorrow.  She will need to start colonoscopy prep this evening.    - Continue to monitor hemoglobin and transfuse if necessary    - Start PPI twice daily    -Patient agrees with this plan and has no further questions or concerns.   Case discussed with Dr. Do, please review MD addendum below.    50 minutes of total time was spent providing patient care, including patient evaluation, reviewing documentation/test results, and     [facility]  [phone]  ________________________________________________________________________        CC: hematochezia     HPI:  Lesley Napoles is a 54 year old female with PMH of rectocele s/p repair, hysterectomy with BSO, asthma, chronic sinusitis with recent sinus surgery on 1/15/2025 who presented to [facility] ED today (1/23/2025) for 2-day history of painless hematochezia.  Patient's sister and daughter were present for today's visit.     Pt explains she was in good health when she developed hematochezia 2 days ago.  She has had multiple bouts of hematochezia over the past 2 days but it seems to be slowing down at this time.  Blood is described to be maroon and black within the toilet water staining red.  She denies any associated symptoms.  No fevers, chills, nausea, vomiting, changes in appetite, abdominal pains, or diarrhea.  She was struggling with some constipation following her sinus surgery likely due to anesthesia and single dose of narcotic.    Patient denies prior history of symptoms.  She has not had an upper endoscopy or colonoscopy.  No family history of GI malignancies or inflammatory bowel disease.  Of note, she is scheduled for an upper endoscopy with pH monitoring with [facility] in the coming months for GERD workup.    Patient does have a history of NSAID usage for sinus headaches and more recently with her sinus surgery.  She also goes on prednisone tapers every so often when her asthma flares.  Currently she is on prednisone 20 mg.  This would be her third prednisone taper since September 2024.    In the ED:   - Vitals: Afebrile, normotensive, heart rate 106 at admission but now normalized  - Labs: Hemoglobin down to 8.7, previously between 13 and 14.  MCV 90, platelets 279.  White count 14.1 but could be elevated due to prednisone.  CMP unremarkable besides mildly elevated BUN at 21.7.  - Imaging: CTA abdomen and pelvis 1/23/2025 negative for acute GI bleed but noted gallstones without  complication, diverticuli without complication.    ROS: A comprehensive ten point review of systems was negative aside from those in mentioned in the HPI.      PAST MEDICAL HISTORY:  Patient Active Problem List    Diagnosis Date Noted    Rectal bleeding 01/23/2025     Priority: Medium    Anemia due to blood loss, acute 01/23/2025     Priority: Medium    Allergic rhinitis caused by mold 01/12/2023     Priority: Medium    Allergic rhinitis due to dust mite 01/12/2023     Priority: Medium    Allergic rhinitis due to animals 01/12/2023     Priority: Medium    Allergic rhinitis due to pollen 06/13/2022     Priority: Medium    Constipation by delayed colonic transit 06/13/2022     Priority: Medium    Excessive and frequent menstruation 06/13/2022     Priority: Medium    Generalized anxiety disorder 06/13/2022     Priority: Medium    Moderate persistent asthma without complication 06/13/2022     Priority: Medium    Recurrent major depression in full remission 06/13/2022     Priority: Medium     SOCIAL HISTORY:  Social History     Tobacco Use    Smoking status: Never    Smokeless tobacco: Never   Vaping Use    Vaping status: Never Used     FAMILY HISTORY:  No family history on file.  ALLERGIES:   Allergies   Allergen Reactions    Articaine Swelling    Sulfamethoxazole-Trimethoprim Diarrhea     MEDICATIONS:   Current Facility-Administered Medications   Medication Dose Route Frequency Provider Last Rate Last Admin    bisacodyl (DULCOLAX) EC tablet 10 mg  10 mg Oral Once Link Jeronimo PA-C        Followed by    polyethylene glycol (MIRALAX) powder 238 g  238 g Oral Once Link Jeronimo PA-C        Followed by    [START ON 1/24/2025] magnesium citrate solution 296 mL  296 mL Oral Once Link Jeronimo PA-C         Current Outpatient Medications   Medication Sig Dispense Refill    albuterol (PROAIR HFA/PROVENTIL HFA/VENTOLIN HFA) 108 (90 Base) MCG/ACT inhaler Inhale 2 puffs into the lungs every 6 hours as needed for  shortness of breath, wheezing or cough. 18 g 11    budesonide (PULMICORT) 0.5 MG/2ML neb solution Spray 0.5 mg in nostril 2 times daily. MIX 1 VIAL INTO SALINE IRRIGATION AND RINSE TWICE DAILY.      citalopram (CELEXA) 40 MG tablet Take 40 mg by mouth daily      fexofenadine (ALLEGRA) 180 MG tablet Take 1 tablet (180 mg) by mouth daily. 90 tablet 5    fluticasone (FLONASE) 50 MCG/ACT nasal spray Spray 2 sprays into both nostrils daily as needed for rhinitis or allergies. 18.2 mL 4    Fluticasone-Umeclidin-Vilanterol (TRELEGY ELLIPTA) 200-62.5-25 MCG/ACT oral inhaler Inhale 1 puff into the lungs daily. 28 each 3    ipratropium - albuterol 0.5 mg/2.5 mg/3 mL (DUONEB) 0.5-2.5 (3) MG/3ML neb solution Take 1 vial (3 mLs) by nebulization every 6 hours as needed for shortness of breath / dyspnea or wheezing 420 mL 1    Multiple Vitamin (MULTIVITAMIN ADULT PO) Take 1 tablet by mouth daily.      Omega-3 Fatty Acids (FISH OIL) 1200 MG capsule Take 1,200 mg by mouth daily.      predniSONE (DELTASONE) 10 MG tablet Take by mouth daily. Finishing taper - 20 mg Daily on 1/23-24, then 10 mg Daily 1/25-26-27, then done.     Take 4 Tablets (40 mg) by mouth once daily with a meal for 3 days, THEN 3 Tablets (30 mg) once daily with a meal for 3 days, THEN 2 Tablets (20 mg) once daily with a meal for 3 days, THEN 1 Tablet (10 mg) once daily with a meal for 3 days.      rosuvastatin (CRESTOR) 10 MG tablet Take 10 mg by mouth daily      EPINEPHrine (ANY BX GENERIC EQUIV) 0.3 MG/0.3ML injection 2-pack Inject into outer thigh for allergic reaction 4 each 0    ORDER FOR ALLERGEN IMMUNOTHERAPY Vaccine A MOLD/ INDOORALLERGENS/ RAGWEED  MM Mold Mix Alternaria, Aspergilus, Cladosporium, Penicillium 1:10 w/v, 1.0 ml  DFM Df Mite D farinae 10,000 AU, 0.5 ml  DPM Dp Mite D pteronyssinus. 10,000 AU, 0.5 ml  DOG AP Dog hair & dander, mixed breeds 1:10 w/v, 0.5 ml  Vaccine B POLLENS/ CAT  G GRASS MIX [place] Blue, Orchard, Redtop, Marco A 100, 000 BAU  "0.3 ml  ELM Elm, American Ulmus Americana 1:20 w/v, 0.5 ml  MAP Maple, Hard/Sugar Acer saccharum 1:20 w/v, 0.5 ml  LQ Lamb's Quarters Chenopodium album 1:20 w/v, 0.5 ml  CAT Cat Hair 10,000 BAU 2.0 ml  TRUNG Trung, White Fraxinus Americana 1:20 w/v, 0.5 ml  Dilutions: None (red) Frequency: weekly  1/10 (yellow) Frequency: weekly  1/100 (blue) Frequency: weekly  1/1000 (green) Frequency: weekly      Diluent: HSA qs to 5ml 5 mL 11       PHYSICAL EXAM:   /63   Pulse 85   Temp 98.3  F (36.8  C) (Oral)   Resp 16   Ht 1.702 m (5' 7\")   Wt 74.8 kg (164 lb 12.8 oz)   SpO2 98%   BMI 25.81 kg/m     GEN: Alert, oriented x3, communicative and in NAD.  ABEBA: AT, anicteric, OP without erythema, exudate, or ulcers.    NECK: Supple.    LYMPH: No LAD noted.  HRT: RRR  LUNGS: CTA  ABD:  ND, +BS, no guarding or pain to palpation, no rebound, no HSM.  SKIN: No rash, jaundice or spider angiomata  MSKL: LE free of edema, strength 5/5 all 4 extrems  NEURO: CN grossly intact, sensation intact to light touch, toes downgoing.     ADDITIONAL DATA:   I reviewed the patient's new clinical lab test results.   Recent Labs   Lab Test 01/23/25  1224 08/29/24  1023 08/22/22  1604   WBC 14.1* 6.9 11.0   HGB 8.7* 13.2 13.2   MCV 90 90 89    232 267   INR 1.02  --   --      Recent Labs   Lab Test 01/23/25  1224 12/04/23  1621 04/25/22  1436 07/29/19  1509   POTASSIUM 3.9 3.6 4.1 3.9   CHLORIDE 104  --  105 106   CO2 23  --  26 25   BUN 21.7*  --  14 10   CR 0.64  --  0.79 0.71   ANIONGAP 11  --  9 6     Recent Labs   Lab Test 01/23/25  1224 08/17/22  0955 04/25/22  1436   ALBUMIN 3.7  --  3.8   BILITOTAL <0.2  --  0.4   ALT 12  --  14   AST 10 22 15        IMAGING:  I reviewed the patient's new imaging results.     Narrative & Impression   EXAM: CTA ABDOMEN PELVIS WITH CONTRAST  LOCATION: [facility]  DATE: 1/23/2025     INDICATION: 4+ episodes of bloody diarrhea daily x 4 days.  4 point hemoglobin drop.  Assess " for GI bleed.  COMPARISON: No prior cross-sectional imaging of the abdomen or pelvis. Noncontrast CT of the chest 9/12/2024.  TECHNIQUE: CT angiogram abdomen pelvis during arterial phase of injection of IV contrast. 2D and 3D MIP reconstructions were performed by the CT technologist. Dose reduction techniques were used.  CONTRAST: 90 mL Isovue 370     FINDINGS:  ANGIOGRAM ABDOMEN/PELVIS: Normal caliber descending thoracic aorta. Normal caliber abdominal aorta and iliac arteries. The celiac axis, SMA, CED and their named proximal branches are widely patent. Renal arteries are also patent.     No arterial phase blush or intraluminal accumulation of contrast on portal venous phase acquisitions to suggest a point source of active gastrointestinal hemorrhage.     LOWER CHEST: There is a fat-containing right posterior Bochdalek hernia. Cylindrical bronchiectasis of subsegmental airways in the lower lobes.     HEPATOBILIARY: Normal.     PANCREAS: There are peripherally calcified stones filling the lumen of the gallbladder. No gallbladder distention or pericholecystic inflammation. The bile ducts are normal caliber. The liver is normal in size with smooth capsule. No liver lesions.     SPLEEN: Normal.     ADRENAL GLANDS: Normal.     KIDNEYS/BLADDER: Normal.     BOWEL: Normal caliber large and small bowel. A few noninflamed diverticula arise from the transverse colon. No bowel wall thickening or inflammatory stranding in the mesentery. No peritoneal thickening or ascites.     LYMPH NODES: Normal.     PELVIC ORGANS: Status post hysterectomy. No pelvic mass or free fluid.     MUSCULOSKELETAL: There are tiny lumbar degenerative osteophytes. No aggressive or destructive bone lesions. No body wall hernia.                                                                      IMPRESSION:     1.  Normal caliber abdominal aorta and iliac arteries.  2.  No findings to suggest a point source of acute gastrointestinal hemorrhage.  3.   Cholelithiasis.  4.  Few noninflamed diverticula arising from the transverse colon.      Addendum  Patient seen and discussed with Link Jeronimo PA-C.  Agree with plan as outlined above.  Patient presents with 2 days of hematochezia.  She has no other symptoms of nausea, vomiting or abdominal pain.  She has been using NSAIDs more frequently due to her recent sinus surgery.  Labs show mildly elevated BUN with normal creatinine.  Differential includes peptic ulcer disease, diverticular disease as well as many other causes.  Will recommend an EGD with colonoscopy tomorrow.  She should be on twice daily PPI, clear liquid diet and prep for colonoscopy night.  More brisk bleeding, can perform urgent EGD.  Further recommendations to be based off of EGD/colonoscopy results.    Kike Do MD

## 2025-01-23 NOTE — H&P
[facility]    History and Physical - Hospitalist Service       Date of Admission:  1/23/2025    Assessment & Plan      Lesley Napoles is a 54 year old female with a pertinent history of asthma and constipation who presents to the ED by car for evaluation of rectal bleeding.  Over the last 3 days patient has had multiple episodes of bloody stool per day.  She was seen by her primary care provider today who noted about a 4 point drop in her hemoglobin.  GI consult plan for EGD and colonoscopy tomorrow and trend hemoglobin.     # Rectal bleeding  # Hematochezia  # Anemia, acute from blood loss  Hemoglobin 8.7 today  Recheck hemoglobin tonight results pending  Approximate baseline hemoglobin 13  Trend CBC in a.m.  Type and screen  Transfuse if hemoglobin less than 7  CTA negative for active bleeding or inflammatory changes but noted diverticulosis.   GI consult recommendations:Plan for EGD and colonoscopy tomorrow.  She will need to start colonoscopy prep this evening. Continue to monitor hemoglobin and transfuse if necessary. Start PPI twice daily  IV Protonix every 12 hours  Normal saline 100 mL maintenance    # Asthma  Albuterol as needed  Pulmicort  Incruse Ellipta and Breo Ellipta    # Sinus surgery 1/15/2025  Prednisone taper    # Generalized anxiety disorder/depression  Resume home citalopram    # Hyperlipidemia  Resume home rosuvastatin           Diet: Combination Diet Clear Liquid  NPO per Anesthesia Guidelines for Procedure/Surgery Except for: Meds    DVT Prophylaxis: Pneumatic Compression Devices  Garrett Catheter: Not present  Lines: None     Cardiac Monitoring: None  Code Status: Full Code      Clinically Significant Risk Factors Present on Admission           # Hypocalcemia: Lowest Ca = 8.5 mg/dL in last 2 days, will monitor and replace as appropriate              # Anemia: based on hgb <11  # Anemia: based on hgb <11       # Overweight: Estimated body mass index is 25.81 kg/m  as  "calculated from the following:    Height as of this encounter: 1.702 m (5' 7\").    Weight as of this encounter: 74.8 kg (164 lb 12.8 oz).       # Asthma: noted on problem list        Disposition Plan     Medically Ready for Discharge: Anticipated in 2-4 Days         The patient's care was discussed with the Attending Physician, Dr. Vanegas .    Aspen Dye NP  Hospitalist Service  [facility]  Securely message with [email or web address] (more info)  Text page via [employer] Paging/Directory     ______________________________________________________________________    Chief Complaint   Rectal bleeding    History is obtained from the patient and chart    History of Present Illness   Lesley Napoles is a 54 year old female with a pertinent history of asthma and constipation who presents to the ED by car for evaluation of rectal bleeding.  Over the last 3 days patient has had multiple episodes of bloody stool per day.  4 episodes on Tuesday and Wednesday.  4 episodes just today until this afternoon.  Notes it is not a normal bowel movement that she cannot identify any distinct feces.  She notes that when she goes to the bathroom just a gush of blood comes out of her rectum.  She is bright red blood with clots in it.  If she had to approximate the volume of blood each time she would say overall.  No history of GI bleeds.  No abdominal pain.  No blood thinners.  No history of hemorrhoids. She was seen by her primary care provider today who noted about a 4 point drop in her hemoglobin and advised her to come to the emergency department for further evaluation.She felt slightly lightheaded when she stood up earlier today.  But no chest pain, shortness of breath, syncope, weakness.    Past Medical History    No past medical history on file.    Past Surgical History   No past surgical history on file.    Prior to Admission Medications   Prior to Admission Medications   Prescriptions Last Dose Informant Patient Reported? " Taking?   EPINEPHrine (ANY BX GENERIC EQUIV) 0.3 MG/0.3ML injection 2-pack   No No   Sig: Inject into outer thigh for allergic reaction   Fluticasone-Umeclidin-Vilanterol (TRELEGY ELLIPTA) 200-62.5-25 MCG/ACT oral inhaler 1/23/2025 Morning  No Yes   Sig: Inhale 1 puff into the lungs daily.   Multiple Vitamin (MULTIVITAMIN ADULT PO) Past Month  Yes Yes   Sig: Take 1 tablet by mouth daily.   ORDER FOR ALLERGEN IMMUNOTHERAPY   No No   Sig: Vaccine A MOLD/ INDOORALLERGENS/ RAGWEED  MM Mold Mix Alternaria, Aspergilus, Cladosporium, Penicillium 1:10 w/v, 1.0 ml  DFM Df Mite D farinae 10,000 AU, 0.5 ml  DPM Dp Mite D pteronyssinus. 10,000 AU, 0.5 ml  DOG AP Dog hair & dander, mixed breeds 1:10 w/v, 0.5 ml  Vaccine B POLLENS/ CAT  G GRASS MIX Kentucky Blue, Orchard, Redtop, Marco A 100, 000 BAU 0.3 ml  ELM Elm, American Ulmus Americana 1:20 w/v, 0.5 ml  MAP Maple, Hard/Sugar Acer saccharum 1:20 w/v, 0.5 ml  LQ Lamb's Quarters Chenopodium album 1:20 w/v, 0.5 ml  CAT Cat Hair 10,000 BAU 2.0 ml  TRUNG Trung, White Fraxinus Americana 1:20 w/v, 0.5 ml  Dilutions: None (red) Frequency: weekly  1/10 (yellow) Frequency: weekly  1/100 (blue) Frequency: weekly  1/1000 (green) Frequency: weekly      Diluent: HSA qs to 5ml   Omega-3 Fatty Acids (FISH OIL) 1200 MG capsule Past Month  Yes Yes   Sig: Take 1,200 mg by mouth daily.   albuterol (PROAIR HFA/PROVENTIL HFA/VENTOLIN HFA) 108 (90 Base) MCG/ACT inhaler Unknown  No Yes   Sig: Inhale 2 puffs into the lungs every 6 hours as needed for shortness of breath, wheezing or cough.   budesonide (PULMICORT) 0.5 MG/2ML neb solution 1/22/2025 Evening  Yes Yes   Sig: Spray 0.5 mg in nostril 2 times daily. MIX 1 VIAL INTO SALINE IRRIGATION AND RINSE TWICE DAILY.   citalopram (CELEXA) 40 MG tablet 1/23/2025 Morning  Yes Yes   Sig: Take 40 mg by mouth daily   fexofenadine (ALLEGRA) 180 MG tablet 1/23/2025 Morning  No Yes   Sig: Take 1 tablet (180 mg) by mouth daily.   fluticasone (FLONASE) 50 MCG/ACT  nasal spray Unknown  No Yes   Sig: Spray 2 sprays into both nostrils daily as needed for rhinitis or allergies.   ipratropium - albuterol 0.5 mg/2.5 mg/3 mL (DUONEB) 0.5-2.5 (3) MG/3ML neb solution Unknown  No Yes   Sig: Take 1 vial (3 mLs) by nebulization every 6 hours as needed for shortness of breath / dyspnea or wheezing   predniSONE (DELTASONE) 10 MG tablet 1/23/2025 Morning  Yes Yes   Sig: Take by mouth daily. Finishing taper - 20 mg Daily on 1/23-24, then 10 mg Daily 1/25-26-27, then done.     Take 4 Tablets (40 mg) by mouth once daily with a meal for 3 days, THEN 3 Tablets (30 mg) once daily with a meal for 3 days, THEN 2 Tablets (20 mg) once daily with a meal for 3 days, THEN 1 Tablet (10 mg) once daily with a meal for 3 days.   rosuvastatin (CRESTOR) 10 MG tablet 1/23/2025 Morning  Yes Yes   Sig: Take 10 mg by mouth daily      Facility-Administered Medications: None        Review of Systems    The 10 point Review of Systems is negative other than noted in the HPI or here.     Social History   I have reviewed this patient's social history and updated it with pertinent information if needed.  Social History     Tobacco Use    Smoking status: Never    Smokeless tobacco: Never   Vaping Use    Vaping status: Never Used       Allergies   Allergies   Allergen Reactions    Articaine Swelling    Sulfamethoxazole-Trimethoprim Diarrhea        Physical Exam   Vital Signs: Temp: 98.3  F (36.8  C) Temp src: Oral BP: 103/63 Pulse: 85   Resp: 16 SpO2: 98 %      Weight: 164 lbs 12.8 oz    Constitutional: awake, alert, cooperative, no apparent distress, and appears stated age  Hematologic / Lymphatic: no cervical lymphadenopathy and no supraclavicular lymphadenopathy  Respiratory: No increased work of breathing, good air exchange, clear to auscultation bilaterally, no crackles or wheezing  Cardiovascular: Normal apical impulse, regular rate and rhythm, normal S1 and S2, no S3 or S4, and no murmur noted  GI: No scars, normal  bowel sounds, soft, non-distended, non-tender, no masses palpated, no hepatosplenomegally  Skin: no bruising or bleeding, normal skin color, texture, turgor, and no redness, warmth, or swelling  Musculoskeletal: There is no redness, warmth, or swelling of the joints.   Neurologic: Awake, alert, oriented to name, place and time.    Neuropsychiatric: General: normal, calm, and normal eye contact    Medical Decision Making       50 MINUTES SPENT BY ME on the date of service doing chart review, history, exam, documentation & further activities per the note.      Data     I have personally reviewed the following data over the past 24 hrs:    14.1 (H)  \   8.7 (L)   / 279     138 104 21.7 (H) /  129 (H)   3.9 23 0.64 \     ALT: 12 AST: 10 AP: 89 TBILI: <0.2   ALB: 3.7 TOT PROTEIN: 6.1 (L) LIPASE: N/A     Procal: N/A CRP: <3.00 Lactic Acid: N/A       INR:  1.02 PTT:  21 (L)   D-dimer:  N/A Fibrinogen:  N/A       Imaging results reviewed over the past 24 hrs:   Recent Results (from the past 24 hours)   CTA Abdomen Pelvis with Contrast    Narrative    EXAM: CTA ABDOMEN PELVIS WITH CONTRAST  LOCATION: [facility]  DATE: 1/23/2025    INDICATION: 4+ episodes of bloody diarrhea daily x 4 days.  4 point hemoglobin drop.  Assess for GI bleed.  COMPARISON: No prior cross-sectional imaging of the abdomen or pelvis. Noncontrast CT of the chest 9/12/2024.  TECHNIQUE: CT angiogram abdomen pelvis during arterial phase of injection of IV contrast. 2D and 3D MIP reconstructions were performed by the CT technologist. Dose reduction techniques were used.  CONTRAST: 90 mL Isovue 370    FINDINGS:  ANGIOGRAM ABDOMEN/PELVIS: Normal caliber descending thoracic aorta. Normal caliber abdominal aorta and iliac arteries. The celiac axis, SMA, CED and their named proximal branches are widely patent. Renal arteries are also patent.    No arterial phase blush or intraluminal accumulation of contrast on portal venous phase  acquisitions to suggest a point source of active gastrointestinal hemorrhage.    LOWER CHEST: There is a fat-containing right posterior Bochdalek hernia. Cylindrical bronchiectasis of subsegmental airways in the lower lobes.    HEPATOBILIARY: Normal.    PANCREAS: There are peripherally calcified stones filling the lumen of the gallbladder. No gallbladder distention or pericholecystic inflammation. The bile ducts are normal caliber. The liver is normal in size with smooth capsule. No liver lesions.    SPLEEN: Normal.    ADRENAL GLANDS: Normal.    KIDNEYS/BLADDER: Normal.    BOWEL: Normal caliber large and small bowel. A few noninflamed diverticula arise from the transverse colon. No bowel wall thickening or inflammatory stranding in the mesentery. No peritoneal thickening or ascites.    LYMPH NODES: Normal.    PELVIC ORGANS: Status post hysterectomy. No pelvic mass or free fluid.    MUSCULOSKELETAL: There are tiny lumbar degenerative osteophytes. No aggressive or destructive bone lesions. No body wall hernia.      Impression    IMPRESSION:    1.  Normal caliber abdominal aorta and iliac arteries.  2.  No findings to suggest a point source of acute gastrointestinal hemorrhage.  3.  Cholelithiasis.  4.  Few noninflamed diverticula arising from the transverse colon.

## 2025-01-23 NOTE — ED NOTES
"[facility] ED Handoff Report    ED Chief Complaint: Rectal Bleeding    ED Diagnosis:  (K62.5) Rectal bleeding  Comment: endorses x days of BRB in stool with no BM  Plan: Case Request: ESOPHAGOGASTRODUODENOSCOPY,         COLONOSCOPY            (D62) Anemia due to blood loss, acute  Comment: has had a 4 point drop in hemoglobin in last 17 days  Plan: observation and reassess       PMH:  No past medical history on file.     Code Status:  No Order     Falls Risk: No Band: Not applicable    Current Living Situation/Residence: lives in a house     Elimination Status: Continent: Yes     Activity Level: Independent    Patients Preferred Language:  English     Needed: No    Vital Signs:  /63   Pulse 85   Temp 98.3  F (36.8  C) (Oral)   Resp 16   Ht 1.702 m (5' 7\")   Wt 74.8 kg (164 lb 12.8 oz)   SpO2 98%   BMI 25.81 kg/m       Cardiac Rhythm: NSR    Pain Score: 0/10    Is the Patient Confused:  No    Last Food or Drink: 01/23/25 at 0900    Focused Assessment:  Patient presents with rectal bleeding x4 days with no BM's. She denies SOB, abdominal pain, N/V/D. Hemoglobin drop of 4 within the last 2 weeks with + DARRIAN. She is AO4, independent, +2 pulses, regular rate/rhythm, lungs clear and with no acute distress. Admission for obs, EGD and reevaluation.    Tests Performed: Done: Labs and Imaging    Treatments Provided:      Family Dynamics/Concerns: No    Family Updated On Visitor Policy: Yes    Plan of Care Communicated to Family: No    Who Was Updated about Plan of Care: Remee - sister    Belongings Checklist Done and Signed by Patient: Yes    Medications sent with patient: miralax powder    Covid: asymptomatic , NOT tested    Additional Information:     RN: Roberto Reeves RN   1/23/2025 4:15 PM       "

## 2025-01-23 NOTE — ED PROVIDER NOTES
Emergency Department Encounter   NAME: Lesley Napoles  AGE: 54 year old female  YOB: 1970  MRN: [id number]    PCP: Theodore Ardon  ED PROVIDER: Tarsha Diaz PA-C    Evaluation Date & Time:   No admission date for patient encounter.    CHIEF COMPLAINT:  Rectal Bleeding      Impression and Plan   MDM: 54-year-old female with a history of constipation presents for evaluation of rectal bleeding.  Several episodes per day over the last 3 days.  Found to have 4 point drop in her hemoglobin over 17 days from primary care.  No abdominal pain.  No history of hemorrhoids.  On arrival here patient is vitally stable.  Afebrile.  On my exam patient is in no acute distress.  Benign nontender abdominal exam.  Rectal exam without any appreciable hemorrhoids.  I do appreciate a red line colored blood/stool on DARRIAN.  Most concerning for GI bleed.  No significant abdominal tenderness concerning for appendicitis, cholecystitis, obstruction, pancreatitis, mesenteric ischemia, aortic syndrome.  Discussed plans for repeat blood work and CTA and likely admission given hemoglobin drop which patient is agreeable to.    On chart [facility] of [facility] that patient brought with her:   Hemoglobin 1/6/25: 12.8  Hemoglobin 1/23/25: 8.6    Lab work here with mild leukocytosis of 14.1.  Hemoglobin down to 8.7.  Again, this demonstrates just over a 4 point drop in the last 17 days.  She is currently not symptomatic and hemodynamically stable, no indication for transfusion at this time.  CMP without any concerning electrolyte abnormality or CHRISTIAN.  Mildly elevated BUN.  Normal INR.  Just slightly decreased PTT at 21.  O+ blood. I reassessed patient we discussed all lab and imaging results.  Overall reassuring  CTA of the abdomen and pelvis without any point source of gastrointestinal hemorrhage.  No acute findings.    Patient continues to be hemodynamically stable.  We discussed all lab and imaging results.  Reviewed plan for  admission given ongoing rectal bleeding and hemoglobin drop which patient is agreeable to.  Page placed to [facility] then will contact hospitalist.    I spoke with Dr. Do with [facility] who agrees with admission.  He will be coming to see the patient.  No further recommendations at this time. I discussed the case with Dr. Vanegas with the hospitalist team who is accepting of admission.     I have staffed the patient with Dr. Duff, ED physician, who will evaluate the patient and agrees with all aspects of today's care.     ED Course as of 01/23/25 1609   Thu Jan 23, 2025   1526 I spoke with Dr. Do with [facility] who agrees with admission.  He will be coming to see the patient.  No further recommendations at this time.   1551 I spoke with Dr. Vanegas with the hospitalist team who is accepting of admission.       Medical Decision Making  Obtained supplemental history:Supplemental history obtained?: Family Member/Significant Other  Reviewed external records: External records reviewed?: No  Care impacted by chronic illness:Documented in Chart  Did you consider but not order tests?: Work up considered but not performed and documented in chart, if applicable  Did you interpret images independently?: Independent interpretation of ECG and images noted in documentation, when applicable.  Consultation discussion with other provider:Did you involve another provider (consultant, MH, pharmacy, etc.)?: I discussed the care with another health care provider, see documentation for details.  [facility] and hospitalist  Admit.    MIPS: Not Applicable        FINAL IMPRESSION:    ICD-10-CM    1. Rectal bleeding  K62.5 Case Request: ESOPHAGOGASTRODUODENOSCOPY, COLONOSCOPY     Case Request: ESOPHAGOGASTRODUODENOSCOPY, COLONOSCOPY      2. Anemia due to blood loss, acute  D62             MEDICATIONS GIVEN IN THE EMERGENCY DEPARTMENT:  Medications   bisacodyl (DULCOLAX) EC tablet 10 mg (has no administration in time range)     Followed by  "  polyethylene glycol (MIRALAX) powder 238 g (has no administration in time range)     Followed by   magnesium citrate solution 296 mL (has no administration in time range)   iopamidol (ISOVUE-370) solution 90 mL (90 mLs Intravenous $Given 1/23/25 1412)         NEW PRESCRIPTIONS STARTED AT TODAY'S ED VISIT:  New Prescriptions    No medications on file         HPI   Patient information was obtained from: patient   Use of Intrepreter: N/A     Lesley Napoles is a 54 year old female with a pertinent history of asthma and constipation who presents to the ED by car for evaluation of rectal bleeding.  Over the last 3 days patient has had multiple episodes of bloody stool per day.  4 episodes on Tuesday and Wednesday.  4 episodes just today until this afternoon.  Notes it is not a normal bowel movement that she cannot identify any distinct feces.  She notes that when she goes to the bathroom just a gush of blood comes out of her rectum.  She is bright red blood with clots in it.  If she had to approximate the volume of blood each time she would say overall.  No history of GI bleeds.  No abdominal pain.  No blood thinners.  No history of hemorrhoids. She was seen by her primary care provider today who noted about a 4 point drop in her hemoglobin and advised her to come to the emergency department for further evaluation.    She felt slightly lightheaded when she stood up earlier today.  But no chest pain, shortness of breath, syncope, weakness.      REVIEW OF SYSTEMS:  Pertinent positive and negative symptoms per HPI.       Physical Exam     First Vitals:  Patient Vitals for the past 24 hrs:   BP Temp Temp src Pulse Resp SpO2 Height Weight   01/23/25 1504 103/63 -- -- 85 -- 98 % -- --   01/23/25 1238 110/73 -- -- 97 -- 94 % -- --   01/23/25 1228 105/75 -- -- 90 -- 99 % -- --   01/23/25 1149 114/79 98.3  F (36.8  C) Oral (!) 106 16 98 % 1.702 m (5' 7\") 74.8 kg (164 lb 12.8 oz)       PHYSICAL EXAM:   General Appearance:  Alert, " cooperative, no distress, appears stated age  HENT: Normocephalic without obvious deformity, atraumatic. Mucous membranes moist   Eyes: Conjunctiva clear, Lids normal. No discharge.   Respiratory: No distress. Lungs clear to ausculation bilaterally. No wheezes, rhonchi or stridor  Cardiovascular: Regular rate and rhythm, no murmur. Normal cap refill.   GI: Abdomen soft, nontender, normal bowel sounds. Rectal exam chaperoned by RN: No appreciated external or internal hemorrhoids.  No anal fissures.  Red wine colored stool/blood on DARRIAN.  : No CVA tenderness  Musculoskeletal: Moving all extremities. No gross deformities  Integument: Warm, dry, no rashes or lesions  Neurologic: Alert and orientated x3.  Psych: Normal mood and affect      Results     LAB:  All pertinent labs reviewed and interpreted  Labs Ordered and Resulted from Time of ED Arrival to Time of ED Departure   CBC WITH PLATELETS - Abnormal       Result Value    WBC Count 14.1 (*)     RBC Count 3.04 (*)     Hemoglobin 8.7 (*)     Hematocrit 27.3 (*)     MCV 90      MCH 28.6      MCHC 31.9      RDW 14.3      Platelet Count 279     COMPREHENSIVE METABOLIC PANEL - Abnormal    Sodium 138      Potassium 3.9      Carbon Dioxide (CO2) 23      Anion Gap 11      Urea Nitrogen 21.7 (*)     Creatinine 0.64      GFR Estimate >90      Calcium 8.5 (*)     Chloride 104      Glucose 129 (*)     Alkaline Phosphatase 89      AST 10      ALT 12      Protein Total 6.1 (*)     Albumin 3.7      Bilirubin Total <0.2     PARTIAL THROMBOPLASTIN TIME - Abnormal    aPTT 21 (*)    INR - Normal    INR 1.02     TYPE AND SCREEN, ADULT    ABO/RH(D) O POS      Antibody Screen Negative      SPECIMEN EXPIRATION DATE [id number]     ABO/RH TYPE AND SCREEN       RADIOLOGY:  CTA Abdomen Pelvis with Contrast   Final Result   IMPRESSION:      1.  Normal caliber abdominal aorta and iliac arteries.   2.  No findings to suggest a point source of acute gastrointestinal hemorrhage.   3.   Cholelithiasis.   4.  Few noninflamed diverticula arising from the transverse colon.            Tarsha Diaz PA-C   Emergency Medicine   [facility] EMERGENCY DEPARTMENT       Tarsha Diaz PA-C  01/23/25 1653

## 2025-01-23 NOTE — ED PROVIDER NOTES
"Emergency Department Midlevel Supervisory Note     I had a face to face encounter with this patient seen by the Advanced Practice Provider (EDDA). I personally made/approved the management plan and take responsibility for the patient management. I personally saw patient and performed a substantive portion of the visit including all aspects of the medical decision making.     ED Course:  12:45 PM Tarsha Diaz PA-C staffed patient with me. I agree with their assessment and plan of management, and I will see the patient.  *** I met with the patient to introduce myself, gather additional history, perform my initial exam, and discuss the plan.     Brief HPI:     Lesley Napoles is a 54 year old female who presents for evaluation of rectal bleeding. Patient reports four days of bright red blood per rectum with no bowel movements. Denies abdominal pain, rectal pain, history of similar symptoms.    I, Yahaira Mclean, am serving as a scribe to document services personally performed by Dayrno Duff, based on my observations and the provider's statements to me.   I, Dayron Duff attest that Yahaira Mclean was acting in a scribe capacity, has observed my performance of the services and has documented them in accordance with my direction.    Brief Physical Exam: /73   Pulse 97   Temp 98.3  F (36.8  C) (Oral)   Resp 16   Ht 1.702 m (5' 7\")   Wt 74.8 kg (164 lb 12.8 oz)   SpO2 94%   BMI 25.81 kg/m    Constitutional:  Alert, in no acute distress  EYES: Conjunctivae clear  HENT:  Atraumatic  Respiratory:  Respirations even, unlabored, in no acute respiratory distress  Cardiovascular:  Regular rate and rhythm, good peripheral perfusion  GI: Soft, non-distended, non-tender  Musculoskeletal:  Moves all 4 extremities equally, grossly symmetrical strength  Integument: Warm & dry. No appreciable rash, erythema.  Neurologic:  Alert & oriented, speech clear and fluent, no focal deficits noted  Psych: Normal mood and " affect       MDM:  ***       No diagnosis found.    Consults:  I discussed the patient with *** who recommends ***    Labs and Imaging:  Results for orders placed or performed during the hospital encounter of 01/23/25   CBC with platelets   Result Value Ref Range    WBC Count 14.1 (H) 4.0 - 11.0 10e3/uL    RBC Count 3.04 (L) 3.80 - 5.20 10e6/uL    Hemoglobin 8.7 (L) 11.7 - 15.7 g/dL    Hematocrit 27.3 (L) 35.0 - 47.0 %    MCV 90 78 - 100 fL    MCH 28.6 26.5 - 33.0 pg    MCHC 31.9 31.5 - 36.5 g/dL    RDW 14.3 10.0 - 15.0 %    Platelet Count 279 150 - 450 10e3/uL   Result Value Ref Range    INR 1.02 0.85 - 1.15   Adult Type and Screen   Result Value Ref Range    SPECIMEN EXPIRATION DATE [id number]        I have reviewed the relevant laboratory studies above.    I independently interpreted the following imaging study(s):   ***    EKG: I reviewed and independently interpreted the patient's EKG, with comments made as listed below. Please see scanned EKG for full report.   ***    Procedures:  I was present for the key portions of procedures documented in EDDA/midlevel note, see midlevel note for further details.    Dayron Duff MD  [facility] EMERGENCY DEPARTMENT  [address] [place] [id number] [phone]          of the procedure and the sedation options and risks                          were discussed with the patient. All questions were                          answered, and informed consent was obtained. Prior                          Anticoagulants: The patient has taken no anticoagulant                          or antiplatelet agents. ASA Grade Assessment: II - A                          patient with mild systemic disease. After reviewing                          the risks and benefits, the patient was deemed in                          satisfactory condition to undergo the procedure.                         After obtaining informed co nsent, the endoscope was                          passed under direct vision. Throughout the procedure,                          the patient's blood pressure, pulse, and oxygen                          saturations were monitored continuously. The endoscope                          was introduced through the mouth, and advanced to the                          third part of duodenum. The upper GI endoscopy was                          accomplished without difficulty. The patient tolerated                          the procedure well.                                                                                   Findings:       The examined esophagus was normal. The Z-line was regular at 36 cm from        the incisors.       The entire examined stomach was normal.       The examined duodenum was normal.                                                                                   Moderate Sedation:       See the other procedure note for documentation of moderate sedation with         intraservice time.  Impression:            - Normal esophagus.                         - Normal stomach.                         - Normal examined duodenum.                         - No specimens collected.  Recommendation:        - Proceed to colonoscopy. See colonoscopy report for                           further recommendations.                                                                                     ________________  CAROLE RAWLS,   1/24/2025 9:06:41 AM  I was physically present for the entire viewing portion of the exam.  __________________________  Signature of teaching physician  Alycia/Dominique RAWLS  Number of Addenda: 0    Note Initiated On: 1/24/2025 8:38 AM  Scope In: 9:00:27 AM  Scope Out: 9:03:57 AM     Result Value Ref Range    COLONOSCOPY       [facility] [address], [place] [address]  _______________________________________________________________________________  Patient Name: Lesley Napoles             Procedure Date: 1/24/2025 9:11 AM  MRN: [id number]                       Account Number: [id number]  YOB: 1970               Admit Type: Inpatient  Age: 54                               Room: [facility] [place] [address]  Note Status: Finalized                Attending MD: CAROLE RAWLS , ,   Instrument Name: Pediatric Colonoscope 6420   _______________________________________________________________________________     Procedure:             Colonoscopy  Indications:           Hematochezia  Providers:             CAROLE RAWLS  Referring MD:            Medicines:             Monitored Anesthesia Care  Complications:         No immediate complications. Estimated blood loss: None.  _______________________________________________________________________________  Procedure:             Pre-Anesthesia A ssessment:                         - Prior to the procedure, a History and Physical was                          performed, and patient medications and allergies were                          reviewed. The patient's tolerance of previous                          anesthesia was also reviewed. The risks and benefits                          of the procedure and the sedation options and risks                          were discussed with the patient. All questions  were                          answered, and informed consent was obtained. Prior                          Anticoagulants: The patient has taken no anticoagulant                          or antiplatelet agents. ASA Grade Assessment: II - A                          patient with mild systemic disease. After reviewing                          the risks and benefits, the patient was deemed in                          satisfactory condition to undergo the procedure.                         - Prior to the procedure, a History and Physic al was                          performed, and patient medications and allergies were                          reviewed. The patient's tolerance of previous                          anesthesia was also reviewed. The risks and benefits                          of the procedure and the sedation options and risks                          were discussed with the patient. All questions were                          answered, and informed consent was obtained. Prior                          Anticoagulants: The patient has taken no anticoagulant                          or antiplatelet agents. ASA Grade Assessment: II - A                          patient with mild systemic disease. After reviewing                          the risks and benefits, the patient was deemed in                          satisfactory condition to undergo the procedure.                         After obtaining informed consent, the colonoscope was                          passed under direct vision. Throughout the proce dure,                          the patient's blood pressure, pulse, and oxygen                          saturations were monitored continuously. The pediatric                          colonoscope was introduced through the anus and                          advanced to the terminal ileum. The colonoscopy was                          performed without difficulty. The patient tolerated                           the procedure well. The quality of the bowel                          preparation was fair. The terminal ileum, ileocecal                          valve, appendiceal orifice, and rectum were                          photographed.                                                                                   Findings:       The perianal and digital rectal examinations were normal.       Multiple sessile polyps were found in the entire colon including one in        the cecum, at least 3 in the ascending colon and a few in the        descending/sigmoid colon. The polyps were small i n size. Polypectomy was        not attempted due to concern for acute lower GI bleeding and inadequate        preparation for screening purposes.       A few small and large-mouthed diverticula were found in the sigmoid        colon and distal descending colon. There was no evidence of active        bleeding. The remaining stool in the distal was red tinged suggestive of        a more distal bleed.       Non-bleeding internal hemorrhoids were found during retroflexion.       The terminal ileum appeared normal. There was no evidence of        melena/bright red blood in the ileum to suggest more proximal bleeding.                                                                                   Moderate Sedation:       See the other procedure note for documentation of moderate sedation with        intraservice time.  Impression:            - Preparation of the colon was fair.                         - Multiple small polyps in the entire colon.                          Polypectomy not att empted due to concern for lower GI                          bleeding and inadequate bowel preparation for                          screening purposes.                         - Diverticulosis in the sigmoid colon and in the                          distal descending colon. The degree if diverticulosis                          was quite mild but given the  negative EGD findings, I                          am suspicious bleeding was due to diverticular                          hemorrhage.                         - Non-bleeding internal hemorrhoids.                         - The examined portion of the ileum was normal.                         - No specimens collected.  Recommendation:        - [facility].                         - Repeat colonoscopy as outpatient for screening                          purposes to remove polyps identified on today's exam.                         - Clear liquid diet now. If repeat Hemoglobin is                           stable this afternoon, okay to advance diet.                         - If repeat bleeding, consider stat CTA or IR                          consultation for suspected diverticular bleeding.                         - No ibuprofen, naproxen, or other non-steroidal                          anti-inflammatory drugs.                                                                                     ________________  CAROLE RAWLS,   1/24/2025 9:45:37 AM  I was physically present for the entire viewing portion of the exam.  __________________________  Signature of teaching physician  Alycia/Dominique RAWLS  Number of Addenda: 0    Note Initiated On: 1/24/2025 9:11 AM  Scope In: 9:14:05 AM  Scope Out: 9:32:12 AM     CTA Abdomen Pelvis with Contrast    Impression    IMPRESSION:    1.  Normal caliber abdominal aorta and iliac arteries.  2.  No findings to suggest a point source of acute gastrointestinal hemorrhage.  3.  Cholelithiasis.  4.  Few noninflamed diverticula arising from the transverse colon.   CBC with platelets   Result Value Ref Range    WBC Count 14.1 (H) 4.0 - 11.0 10e3/uL    RBC Count 3.04 (L) 3.80 - 5.20 10e6/uL    Hemoglobin 8.7 (L) 11.7 - 15.7 g/dL    Hematocrit 27.3 (L) 35.0 - 47.0 %    MCV 90 78 - 100 fL    MCH 28.6 26.5 - 33.0 pg    MCHC 31.9 31.5 - 36.5 g/dL    RDW 14.3  10.0 - 15.0 %    Platelet Count 279 150 - 450 10e3/uL   Comprehensive metabolic panel   Result Value Ref Range    Sodium 138 135 - 145 mmol/L    Potassium 3.9 3.4 - 5.3 mmol/L    Carbon Dioxide (CO2) 23 22 - 29 mmol/L    Anion Gap 11 7 - 15 mmol/L    Urea Nitrogen 21.7 (H) 6.0 - 20.0 mg/dL    Creatinine 0.64 0.51 - 0.95 mg/dL    GFR Estimate >90 >60 mL/min/1.73m2    Calcium 8.5 (L) 8.8 - 10.4 mg/dL    Chloride 104 98 - 107 mmol/L    Glucose 129 (H) 70 - 99 mg/dL    Alkaline Phosphatase 89 40 - 150 U/L    AST 10 0 - 45 U/L    ALT 12 0 - 50 U/L    Protein Total 6.1 (L) 6.4 - 8.3 g/dL    Albumin 3.7 3.5 - 5.2 g/dL    Bilirubin Total <0.2 <=1.2 mg/dL   Result Value Ref Range    INR 1.02 0.85 - 1.15   Result Value Ref Range    aPTT 21 (L) 22 - 38 Seconds   Result Value Ref Range    Hemoglobin 7.5 (L) 11.7 - 15.7 g/dL   Result Value Ref Range    Hemoglobin 7.1 (L) 11.7 - 15.7 g/dL   Glucose by meter   Result Value Ref Range    GLUCOSE BY METER POCT 114 (H) 70 - 99 mg/dL   Basic metabolic panel   Result Value Ref Range    Sodium 141 135 - 145 mmol/L    Potassium 3.9 3.4 - 5.3 mmol/L    Chloride 109 (H) 98 - 107 mmol/L    Carbon Dioxide (CO2) 26 22 - 29 mmol/L    Anion Gap 6 (L) 7 - 15 mmol/L    Urea Nitrogen 14.4 6.0 - 20.0 mg/dL    Creatinine 0.75 0.51 - 0.95 mg/dL    GFR Estimate >90 >60 mL/min/1.73m2    Calcium 8.8 8.8 - 10.4 mg/dL    Glucose 94 70 - 99 mg/dL   CBC with platelets   Result Value Ref Range    WBC Count 7.2 4.0 - 11.0 10e3/uL    RBC Count 2.54 (L) 3.80 - 5.20 10e6/uL    Hemoglobin 7.2 (L) 11.7 - 15.7 g/dL    Hematocrit 23.0 (L) 35.0 - 47.0 %    MCV 91 78 - 100 fL    MCH 28.3 26.5 - 33.0 pg    MCHC 31.3 (L) 31.5 - 36.5 g/dL    RDW 14.6 10.0 - 15.0 %    Platelet Count 222 150 - 450 10e3/uL   Result Value Ref Range    Hemoglobin 6.4 (LL) 11.7 - 15.7 g/dL   Result Value Ref Range    Lactate Dehydrogenase 136 0 - 250 U/L   Result Value Ref Range    Haptoglobin 130 30 - 200 mg/dL   Result Value Ref Range     Vitamin B12 887 232 - 1,245 pg/mL   Iron and iron binding capacity   Result Value Ref Range    Iron 35 (L) 37 - 145 ug/dL    Iron Binding Capacity 306 240 - 430 ug/dL    Iron Sat Index 11 (L) 15 - 46 %   Result Value Ref Range    Ferritin 13 11 - 328 ng/mL   CBC with platelets and differential   Result Value Ref Range    WBC Count 6.5 4.0 - 11.0 10e3/uL    RBC Count 2.23 (L) 3.80 - 5.20 10e6/uL    Hemoglobin 6.4 (LL) 11.7 - 15.7 g/dL    Hematocrit 20.2 (L) 35.0 - 47.0 %    MCV 91 78 - 100 fL    MCH 28.7 26.5 - 33.0 pg    MCHC 31.7 31.5 - 36.5 g/dL    RDW 14.6 10.0 - 15.0 %    Platelet Count 186 150 - 450 10e3/uL    % Neutrophils 59 %    % Lymphocytes 30 %    % Monocytes 8 %    % Eosinophils 1 %    % Basophils 1 %    % Immature Granulocytes 1 %    NRBCs per 100 WBC 0 <1 /100    Absolute Neutrophils 3.8 1.6 - 8.3 10e3/uL    Absolute Lymphocytes 2.0 0.8 - 5.3 10e3/uL    Absolute Monocytes 0.5 0.0 - 1.3 10e3/uL    Absolute Eosinophils 0.1 0.0 - 0.7 10e3/uL    Absolute Basophils 0.1 0.0 - 0.2 10e3/uL    Absolute Immature Granulocytes 0.0 <=0.4 10e3/uL    Absolute NRBCs 0.0 10e3/uL   Reticulocyte count   Result Value Ref Range    % Reticulocyte 3.1 (H) 0.5 - 2.0 %    Absolute Reticulocyte 0.069 0.025 - 0.095 10e6/uL   Result Value Ref Range    Hemoglobin 8.2 (L) 11.7 - 15.7 g/dL   Result Value Ref Range    Hemoglobin 7.6 (L) 11.7 - 15.7 g/dL   Basic metabolic panel   Result Value Ref Range    Sodium 141 135 - 145 mmol/L    Potassium 3.7 3.4 - 5.3 mmol/L    Chloride 108 (H) 98 - 107 mmol/L    Carbon Dioxide (CO2) 29 22 - 29 mmol/L    Anion Gap 4 (L) 7 - 15 mmol/L    Urea Nitrogen 8.8 6.0 - 20.0 mg/dL    Creatinine 0.64 0.51 - 0.95 mg/dL    GFR Estimate >90 >60 mL/min/1.73m2    Calcium 8.8 8.8 - 10.4 mg/dL    Glucose 87 70 - 99 mg/dL   CBC with platelets   Result Value Ref Range    WBC Count 6.5 4.0 - 11.0 10e3/uL    RBC Count 2.72 (L) 3.80 - 5.20 10e6/uL    Hemoglobin 7.7 (L) 11.7 - 15.7 g/dL    Hematocrit 24.3 (L) 35.0 -  47.0 %    MCV 89 78 - 100 fL    MCH 28.3 26.5 - 33.0 pg    MCHC 31.7 31.5 - 36.5 g/dL    RDW 14.8 10.0 - 15.0 %    Platelet Count 215 150 - 450 10e3/uL   Result Value Ref Range    Magnesium 2.4 (H) 1.7 - 2.3 mg/dL   Result Value Ref Range    Phosphorus 3.7 2.5 - 4.5 mg/dL   Result Value Ref Range    Folic Acid 19.1 4.6 - 34.8 ng/mL   Adult Type and Screen   Result Value Ref Range    ABO/RH(D) O POS     Antibody Screen Negative Negative    SPECIMEN EXPIRATION DATE [id number]    Prepare red blood cells (unit)   Result Value Ref Range    Blood Component Type Red Blood Cells     Product Code N8500H34     Unit Status Transfused     Unit Number [id number]     CROSSMATCH Compatible     CODING SYSTEM UPHI194     ISSUE DATE AND TIME [id number]     UNIT ABO/RH O+     UNIT TYPE ISBT 5100    Bld morphology pathology review   Result Value Ref Range    Final Diagnosis       PERIPHERAL BLOOD SMEAR MORPHOLOGY:  1.  MODERATE NORMOCHROMIC-NORMOCYTIC ANEMIA  A.  MILD ANISOCYTOSIS WITHOUT INCREASED POIKILOCYTOSIS  B.  MILD CORRESPONDING RETICULOCYTOSIS  2.  NORMAL LEUKOCYTE DIFFERENTIAL AND MORPHOLOGY  3.  UNREMARKABLE PLATELET MORPHOLOGY        Comment       The Red blood cell morphology is consistent with clinical history of acute blood loss.    Ferritin, haptoglobin and vitamin B12: Pending      Clinical Information       Acute GI bleed      Peripheral Smear       Erythrocytes are moderately decreased in number, normochromic and normocytic.  There is mild anisocytosis without increased poikilocytosis or erythrocyte fragmentation.  Increased polychromasia, basophilic stippling and nucleated red blood cells are not identified.     Leukocytes are normal in number and show a normal differential distribution, without a neutrophilic left shift.  Megaloblastic changes, dysplastic features and blasts are not identified.  Lymphocytes are mature and polymorphic in appearance.    Platelets are normal in number and morphology.       Performing Labs       The technical component of this testing was completed at the [facility] and [facility]          I have reviewed the relevant laboratory studies above.      Procedures:  I was present for the key portions of procedures documented in EDDA/midlevel note, see midlevel note for further details.    Dayron Duff MD  [facility] EMERGENCY DEPARTMENT  [address] [place] [id number]  [phone]       Dayron Duff MD  01/30/25 0939

## 2025-01-23 NOTE — PHARMACY-ADMISSION MEDICATION HISTORY
Pharmacist Admission Medication History    Admission medication history is complete. The information provided in this note is only as accurate as the sources available at the time of the update.    Information Source(s): Patient and CareEverywhere/SureScripts via in-person    Pertinent Information: Pt is no longer getting allergy shots (and no longer needs Epi-pen).     Changes made to PTA medication list:  Added: budesonide irrigation, prednisone  Deleted: azelastine, ipratropium nasal sprays  Changed: None    Allergies reviewed with patient and updates made in EHR: yes    Medication History Completed By: Hope Brownlee [facility] 1/23/2025 1:33 PM    PTA Med List   Medication Sig Note Last Dose/Taking    albuterol (PROAIR HFA/PROVENTIL HFA/VENTOLIN HFA) 108 (90 Base) MCG/ACT inhaler Inhale 2 puffs into the lungs every 6 hours as needed for shortness of breath, wheezing or cough.  Unknown    budesonide (PULMICORT) 0.5 MG/2ML neb solution Spray 0.5 mg in nostril 2 times daily. MIX 1 VIAL INTO SALINE IRRIGATION AND RINSE TWICE DAILY.  1/22/2025 Evening    citalopram (CELEXA) 40 MG tablet Take 40 mg by mouth daily  1/23/2025 Morning    fexofenadine (ALLEGRA) 180 MG tablet Take 1 tablet (180 mg) by mouth daily.  1/23/2025 Morning    fluticasone (FLONASE) 50 MCG/ACT nasal spray Spray 2 sprays into both nostrils daily as needed for rhinitis or allergies.  Unknown    Fluticasone-Umeclidin-Vilanterol (TRELEGY ELLIPTA) 200-62.5-25 MCG/ACT oral inhaler Inhale 1 puff into the lungs daily.  1/23/2025 Morning    ipratropium - albuterol 0.5 mg/2.5 mg/3 mL (DUONEB) 0.5-2.5 (3) MG/3ML neb solution Take 1 vial (3 mLs) by nebulization every 6 hours as needed for shortness of breath / dyspnea or wheezing  Unknown    Multiple Vitamin (MULTIVITAMIN ADULT PO) Take 1 tablet by mouth daily. 1/23/2025: On hold for recent surgery Past Month    Omega-3 Fatty Acids (FISH OIL) 1200 MG capsule Take 1,200 mg by mouth daily. 1/23/2025: On hold for  recent surgery Past Month    predniSONE (DELTASONE) 10 MG tablet Take by mouth daily. Finishing taper - 20 mg Daily on 1/23-24, then 10 mg Daily 1/25-26-27, then done.     Take 4 Tablets (40 mg) by mouth once daily with a meal for 3 days, THEN 3 Tablets (30 mg) once daily with a meal for 3 days, THEN 2 Tablets (20 mg) once daily with a meal for 3 days, THEN 1 Tablet (10 mg) once daily with a meal for 3 days.  1/23/2025 Morning    rosuvastatin (CRESTOR) 10 MG tablet Take 10 mg by mouth daily  1/23/2025 Morning

## 2025-01-23 NOTE — ED TRIAGE NOTES
"Pt reports bright red rectal bleeding that started on Tuesday. Pt denies soaking through pads but reports any time she sits on the toliet it \"pours out\" Pt was seen by her PCP today and her Hgb was 8.6. Pt had sinus surgery on the 15th but denies constipation     Triage Assessment (Adult)       Row Name 01/23/25 1157          Triage Assessment    Airway WDL WDL        Respiratory WDL    Respiratory WDL WDL        Skin Circulation/Temperature WDL    Skin Circulation/Temperature WDL WDL        Cardiac WDL    Cardiac WDL WDL        Peripheral/Neurovascular WDL    Peripheral Neurovascular WDL WDL        Cognitive/Neuro/Behavioral WDL    Cognitive/Neuro/Behavioral WDL WDL                     "

## 2025-01-24 LAB
ANION GAP SERPL CALCULATED.3IONS-SCNC: 6 MMOL/L (ref 7–15)
BASOPHILS # BLD AUTO: 0.1 10E3/UL (ref 0–0.2)
BASOPHILS NFR BLD AUTO: 1 %
BLD PROD TYP BPU: NORMAL
BLOOD COMPONENT TYPE: NORMAL
BUN SERPL-MCNC: 14.4 MG/DL (ref 6–20)
CALCIUM SERPL-MCNC: 8.8 MG/DL (ref 8.8–10.4)
CHLORIDE SERPL-SCNC: 109 MMOL/L (ref 98–107)
CODING SYSTEM: NORMAL
COLONOSCOPY: NORMAL
CREAT SERPL-MCNC: 0.75 MG/DL (ref 0.51–0.95)
CROSSMATCH: NORMAL
EGFRCR SERPLBLD CKD-EPI 2021: >90 ML/MIN/1.73M2
EOSINOPHIL # BLD AUTO: 0.1 10E3/UL (ref 0–0.7)
EOSINOPHIL NFR BLD AUTO: 1 %
ERYTHROCYTE [DISTWIDTH] IN BLOOD BY AUTOMATED COUNT: 14.6 % (ref 10–15)
ERYTHROCYTE [DISTWIDTH] IN BLOOD BY AUTOMATED COUNT: 14.6 % (ref 10–15)
FERRITIN SERPL-MCNC: 13 NG/ML (ref 11–328)
GLUCOSE SERPL-MCNC: 94 MG/DL (ref 70–99)
HAPTOGLOB SERPL-MCNC: 130 MG/DL (ref 30–200)
HCO3 SERPL-SCNC: 26 MMOL/L (ref 22–29)
HCT VFR BLD AUTO: 20.2 % (ref 35–47)
HCT VFR BLD AUTO: 23 % (ref 35–47)
HGB BLD-MCNC: 6.4 G/DL (ref 11.7–15.7)
HGB BLD-MCNC: 6.4 G/DL (ref 11.7–15.7)
HGB BLD-MCNC: 7.1 G/DL (ref 11.7–15.7)
HGB BLD-MCNC: 7.2 G/DL (ref 11.7–15.7)
HGB BLD-MCNC: 8.2 G/DL (ref 11.7–15.7)
IMM GRANULOCYTES # BLD: 0 10E3/UL
IMM GRANULOCYTES NFR BLD: 1 %
IRON BINDING CAPACITY (ROCHE): 306 UG/DL (ref 240–430)
IRON SATN MFR SERPL: 11 % (ref 15–46)
IRON SERPL-MCNC: 35 UG/DL (ref 37–145)
ISSUE DATE AND TIME: NORMAL
LDH SERPL L TO P-CCNC: 136 U/L (ref 0–250)
LYMPHOCYTES # BLD AUTO: 2 10E3/UL (ref 0.8–5.3)
LYMPHOCYTES NFR BLD AUTO: 30 %
MCH RBC QN AUTO: 28.3 PG (ref 26.5–33)
MCH RBC QN AUTO: 28.7 PG (ref 26.5–33)
MCHC RBC AUTO-ENTMCNC: 31.3 G/DL (ref 31.5–36.5)
MCHC RBC AUTO-ENTMCNC: 31.7 G/DL (ref 31.5–36.5)
MCV RBC AUTO: 91 FL (ref 78–100)
MCV RBC AUTO: 91 FL (ref 78–100)
MONOCYTES # BLD AUTO: 0.5 10E3/UL (ref 0–1.3)
MONOCYTES NFR BLD AUTO: 8 %
NEUTROPHILS # BLD AUTO: 3.8 10E3/UL (ref 1.6–8.3)
NEUTROPHILS NFR BLD AUTO: 59 %
NRBC # BLD AUTO: 0 10E3/UL
NRBC BLD AUTO-RTO: 0 /100
PATH REPORT.COMMENTS IMP SPEC: NORMAL
PATH REPORT.COMMENTS IMP SPEC: NORMAL
PATH REPORT.FINAL DX SPEC: NORMAL
PATH REPORT.MICROSCOPIC SPEC OTHER STN: NORMAL
PATH REPORT.RELEVANT HX SPEC: NORMAL
PLATELET # BLD AUTO: 186 10E3/UL (ref 150–450)
PLATELET # BLD AUTO: 222 10E3/UL (ref 150–450)
POTASSIUM SERPL-SCNC: 3.9 MMOL/L (ref 3.4–5.3)
RBC # BLD AUTO: 2.23 10E6/UL (ref 3.8–5.2)
RBC # BLD AUTO: 2.54 10E6/UL (ref 3.8–5.2)
RETICS # AUTO: 0.07 10E6/UL (ref 0.03–0.1)
RETICS/RBC NFR AUTO: 3.1 % (ref 0.5–2)
SODIUM SERPL-SCNC: 141 MMOL/L (ref 135–145)
UNIT ABO/RH: NORMAL
UNIT NUMBER: NORMAL
UNIT STATUS: NORMAL
UNIT TYPE ISBT: 5100
UPPER GI ENDOSCOPY: NORMAL
VIT B12 SERPL-MCNC: 887 PG/ML (ref 232–1245)
WBC # BLD AUTO: 6.5 10E3/UL (ref 4–11)
WBC # BLD AUTO: 7.2 10E3/UL (ref 4–11)

## 2025-01-24 PROCEDURE — 82374 ASSAY BLOOD CARBON DIOXIDE: CPT

## 2025-01-24 PROCEDURE — 83540 ASSAY OF IRON: CPT | Performed by: STUDENT IN AN ORGANIZED HEALTH CARE EDUCATION/TRAINING PROGRAM

## 2025-01-24 PROCEDURE — 36415 COLL VENOUS BLD VENIPUNCTURE: CPT | Performed by: INTERNAL MEDICINE

## 2025-01-24 PROCEDURE — 272N000001 HC OR GENERAL SUPPLY STERILE: Performed by: INTERNAL MEDICINE

## 2025-01-24 PROCEDURE — 82607 VITAMIN B-12: CPT | Performed by: STUDENT IN AN ORGANIZED HEALTH CARE EDUCATION/TRAINING PROGRAM

## 2025-01-24 PROCEDURE — P9016 RBC LEUKOCYTES REDUCED: HCPCS | Performed by: STUDENT IN AN ORGANIZED HEALTH CARE EDUCATION/TRAINING PROGRAM

## 2025-01-24 PROCEDURE — 36415 COLL VENOUS BLD VENIPUNCTURE: CPT

## 2025-01-24 PROCEDURE — 85018 HEMOGLOBIN: CPT | Performed by: INTERNAL MEDICINE

## 2025-01-24 PROCEDURE — 99232 SBSQ HOSP IP/OBS MODERATE 35: CPT | Performed by: STUDENT IN AN ORGANIZED HEALTH CARE EDUCATION/TRAINING PROGRAM

## 2025-01-24 PROCEDURE — 80048 BASIC METABOLIC PNL TOTAL CA: CPT

## 2025-01-24 PROCEDURE — 85025 COMPLETE CBC W/AUTO DIFF WBC: CPT | Performed by: INTERNAL MEDICINE

## 2025-01-24 PROCEDURE — 999N000141 HC STATISTIC PRE-PROCEDURE NURSING ASSESSMENT: Performed by: INTERNAL MEDICINE

## 2025-01-24 PROCEDURE — 85014 HEMATOCRIT: CPT

## 2025-01-24 PROCEDURE — 83615 LACTATE (LD) (LDH) ENZYME: CPT | Performed by: STUDENT IN AN ORGANIZED HEALTH CARE EDUCATION/TRAINING PROGRAM

## 2025-01-24 PROCEDURE — 360N000075 HC SURGERY LEVEL 2, PER MIN: Performed by: INTERNAL MEDICINE

## 2025-01-24 PROCEDURE — 0DJD8ZZ INSPECTION OF LOWER INTESTINAL TRACT, VIA NATURAL OR ARTIFICIAL OPENING ENDOSCOPIC: ICD-10-PCS | Performed by: INTERNAL MEDICINE

## 2025-01-24 PROCEDURE — 250N000012 HC RX MED GY IP 250 OP 636 PS 637: Performed by: STUDENT IN AN ORGANIZED HEALTH CARE EDUCATION/TRAINING PROGRAM

## 2025-01-24 PROCEDURE — 85060 BLOOD SMEAR INTERPRETATION: CPT | Performed by: PATHOLOGY

## 2025-01-24 PROCEDURE — 83550 IRON BINDING TEST: CPT | Performed by: STUDENT IN AN ORGANIZED HEALTH CARE EDUCATION/TRAINING PROGRAM

## 2025-01-24 PROCEDURE — 250N000013 HC RX MED GY IP 250 OP 250 PS 637: Performed by: STUDENT IN AN ORGANIZED HEALTH CARE EDUCATION/TRAINING PROGRAM

## 2025-01-24 PROCEDURE — 250N000013 HC RX MED GY IP 250 OP 250 PS 637

## 2025-01-24 PROCEDURE — 120N000001 HC R&B MED SURG/OB

## 2025-01-24 PROCEDURE — 83010 ASSAY OF HAPTOGLOBIN QUANT: CPT | Performed by: STUDENT IN AN ORGANIZED HEALTH CARE EDUCATION/TRAINING PROGRAM

## 2025-01-24 PROCEDURE — 82435 ASSAY OF BLOOD CHLORIDE: CPT

## 2025-01-24 PROCEDURE — 250N000009 HC RX 250

## 2025-01-24 PROCEDURE — 85014 HEMATOCRIT: CPT | Performed by: STUDENT IN AN ORGANIZED HEALTH CARE EDUCATION/TRAINING PROGRAM

## 2025-01-24 PROCEDURE — 85045 AUTOMATED RETICULOCYTE COUNT: CPT | Performed by: STUDENT IN AN ORGANIZED HEALTH CARE EDUCATION/TRAINING PROGRAM

## 2025-01-24 PROCEDURE — 370N000017 HC ANESTHESIA TECHNICAL FEE, PER MIN: Performed by: INTERNAL MEDICINE

## 2025-01-24 PROCEDURE — 82728 ASSAY OF FERRITIN: CPT | Performed by: STUDENT IN AN ORGANIZED HEALTH CARE EDUCATION/TRAINING PROGRAM

## 2025-01-24 RX ORDER — FEXOFENADINE HCL 60 MG/1
180 TABLET, FILM COATED ORAL DAILY
Status: DISCONTINUED | OUTPATIENT
Start: 2025-01-24 | End: 2025-01-25 | Stop reason: HOSPADM

## 2025-01-24 RX ORDER — IPRATROPIUM BROMIDE AND ALBUTEROL SULFATE 2.5; .5 MG/3ML; MG/3ML
3 SOLUTION RESPIRATORY (INHALATION) EVERY 4 HOURS PRN
Status: DISCONTINUED | OUTPATIENT
Start: 2025-01-24 | End: 2025-01-25 | Stop reason: HOSPADM

## 2025-01-24 RX ORDER — FLUMAZENIL 0.1 MG/ML
0.2 INJECTION, SOLUTION INTRAVENOUS
Status: ACTIVE | OUTPATIENT
Start: 2025-01-24 | End: 2025-01-24

## 2025-01-24 RX ORDER — FLUTICASONE FUROATE AND VILANTEROL 100; 25 UG/1; UG/1
1 POWDER RESPIRATORY (INHALATION) DAILY
Status: DISCONTINUED | OUTPATIENT
Start: 2025-01-24 | End: 2025-01-25 | Stop reason: HOSPADM

## 2025-01-24 RX ORDER — PANTOPRAZOLE SODIUM 40 MG/1
40 TABLET, DELAYED RELEASE ORAL
Status: DISCONTINUED | OUTPATIENT
Start: 2025-01-25 | End: 2025-01-25 | Stop reason: HOSPADM

## 2025-01-24 RX ORDER — NALOXONE HYDROCHLORIDE 0.4 MG/ML
0.4 INJECTION, SOLUTION INTRAMUSCULAR; INTRAVENOUS; SUBCUTANEOUS
Status: DISCONTINUED | OUTPATIENT
Start: 2025-01-24 | End: 2025-01-25 | Stop reason: HOSPADM

## 2025-01-24 RX ORDER — PREDNISONE 20 MG/1
20 TABLET ORAL ONCE
Status: COMPLETED | OUTPATIENT
Start: 2025-01-24 | End: 2025-01-24

## 2025-01-24 RX ORDER — SODIUM CHLORIDE, SODIUM LACTATE, POTASSIUM CHLORIDE, CALCIUM CHLORIDE 600; 310; 30; 20 MG/100ML; MG/100ML; MG/100ML; MG/100ML
INJECTION, SOLUTION INTRAVENOUS CONTINUOUS
Status: DISCONTINUED | OUTPATIENT
Start: 2025-01-24 | End: 2025-01-24 | Stop reason: HOSPADM

## 2025-01-24 RX ORDER — LIDOCAINE 40 MG/G
CREAM TOPICAL
Status: DISCONTINUED | OUTPATIENT
Start: 2025-01-24 | End: 2025-01-24 | Stop reason: HOSPADM

## 2025-01-24 RX ORDER — NALOXONE HYDROCHLORIDE 0.4 MG/ML
0.2 INJECTION, SOLUTION INTRAMUSCULAR; INTRAVENOUS; SUBCUTANEOUS
Status: DISCONTINUED | OUTPATIENT
Start: 2025-01-24 | End: 2025-01-25 | Stop reason: HOSPADM

## 2025-01-24 RX ORDER — ROSUVASTATIN CALCIUM 10 MG/1
10 TABLET, COATED ORAL DAILY
Status: DISCONTINUED | OUTPATIENT
Start: 2025-01-24 | End: 2025-01-25 | Stop reason: HOSPADM

## 2025-01-24 RX ORDER — CITALOPRAM HYDROBROMIDE 40 MG/1
40 TABLET ORAL DAILY
Status: DISCONTINUED | OUTPATIENT
Start: 2025-01-24 | End: 2025-01-25 | Stop reason: HOSPADM

## 2025-01-24 RX ORDER — BUDESONIDE 0.5 MG/2ML
0.5 INHALANT ORAL 2 TIMES DAILY
Status: DISCONTINUED | OUTPATIENT
Start: 2025-01-24 | End: 2025-01-24

## 2025-01-24 RX ADMIN — UMECLIDINIUM 1 PUFF: 62.5 AEROSOL, POWDER ORAL at 11:09

## 2025-01-24 RX ADMIN — FLUTICASONE FUROATE AND VILANTEROL TRIFENATATE 1 PUFF: 100; 25 POWDER RESPIRATORY (INHALATION) at 11:09

## 2025-01-24 RX ADMIN — MAGNESIUM CITRATE 296 ML: 1.75 LIQUID ORAL at 04:30

## 2025-01-24 RX ADMIN — CITALOPRAM 40 MG: 40 TABLET ORAL at 11:13

## 2025-01-24 RX ADMIN — FEXOFENADINE HYDROCHLORIDE 180 MG: 60 TABLET ORAL at 11:13

## 2025-01-24 RX ADMIN — PREDNISONE 20 MG: 20 TABLET ORAL at 11:14

## 2025-01-24 RX ADMIN — PANTOPRAZOLE SODIUM 40 MG: 40 INJECTION, POWDER, FOR SOLUTION INTRAVENOUS at 06:37

## 2025-01-24 RX ADMIN — ROSUVASTATIN 10 MG: 10 TABLET, FILM COATED ORAL at 11:13

## 2025-01-24 ASSESSMENT — ACTIVITIES OF DAILY LIVING (ADL)
ADLS_ACUITY_SCORE: 20
ADLS_ACUITY_SCORE: 18
ADLS_ACUITY_SCORE: 20
ADLS_ACUITY_SCORE: 18
ADLS_ACUITY_SCORE: 20
ADLS_ACUITY_SCORE: 20
ADLS_ACUITY_SCORE: 18
ADLS_ACUITY_SCORE: 20
ADLS_ACUITY_SCORE: 18
ADLS_ACUITY_SCORE: 20
ADLS_ACUITY_SCORE: 20

## 2025-01-24 NOTE — PROGRESS NOTES
Dual Skin Assessment Note:    Patient admitted from ED  to P2.     Comprehensive skin inspection completed by myself and Meera Yang RN.     Abnormal skin assessment findings: none    LDA Initiated for skin breakdown/non-blanchable redness: No    Provider notified: No    If yes, WOC Consult order obtained: Mercedes Chaudhari RN 10:13 PM

## 2025-01-24 NOTE — PLAN OF CARE
Problem: Adult Inpatient Plan of Care  Goal: Absence of Hospital-Acquired Illness or Injury  Intervention: Identify and Manage Fall Risk  Recent Flowsheet Documentation  Taken 1/24/2025 0210 by Emily Vu RN  Safety Promotion/Fall Prevention:   lighting adjusted   safety round/check completed     Problem: Adult Inpatient Plan of Care  Goal: Optimal Comfort and Wellbeing  Intervention: Monitor Pain and Promote Comfort  Recent Flowsheet Documentation  Taken 1/24/2025 0641 by Emily Vu RN  Pain Management Interventions: medication offered but refused   Goal Outcome Evaluation:    Pt A/O x4, VSS on RA, denies pain beginning of shift  Multiple loose/liquid, brown stools with red streaks overnight  Magnesium citrate finished before 0500  Continuous NS was discontinued   NPO status since midnight  C/o of HA 2/10 pain this morning, medication offered but refused, pt will call for pain medication if they want it  Last Hgb 7.1, this morning was 7.2  Tele: CHEL

## 2025-01-24 NOTE — H&P
Pre-procedure Note    Reason for procedure: hematochezia    History and Physical Reviewed: Reviewed, no changes.    Pre-sedation assessment:    General: alert, appears stated age, and cooperative  Airway: normal  Heart: regular rate and rhythm  Lungs: clear to auscultation bilaterally    Sedation Plan based on assessment: MAC    Mallampati score: Class II (visualization of the soft palate, fauces, and uvula)    ASA Classification: ASA 2 - Patient with mild systemic disease with no functional limitations    Impression: Patient deemed adequate candidate for MAC sedation    Risks, benefits and alternatives were discussed with the patient and informed consent was obtained.    Plan: colonoscopy and esophagogastroduodenoscopy      Kike Do MD 1/24/2025 8:46 AM                                               Kike Do MD  Thank you for the opportunity to participate in the care of this patient.   Please feel free to call me with any questions or concerns.  Phone number [phone].

## 2025-01-24 NOTE — PROGRESS NOTES
[facility]    Medicine Progress Note - Hospitalist Service    Date of Admission:  1/23/2025    Assessment & Plan      Lesley Napoles is a 54 year old female with a pertinent history of asthma and constipation who presents to the ED by car for evaluation of 3 days of rectal bleeding. She was seen by her primary care provider 1/23/25 who noted about a 4 point drop in her hemoglobin. Hgb at [facility] ED was 8.7.    # Hematochezia  #Anemia, acute from blood loss  - Reports 3 days of rectal bleeding and hematochezia.  - Hgb in the ED 8.7 initially.  - CTA A/P in ED negative for active bleeding or inflammatory changes.   - Underwent EGD/Colonoscopy with GI on 1/24 showing diverticulosis (likely source of bleeding) and non-bleeding internal hemorrhoids.  - EGD showed normal esophagus, stomach, and duodenum.   - Required 1 unit RBCs 1/24PM for hgb 6.4.  - LDH, Haptoglobin, Bilirubin not indicative of hemolysis. B12 normal.  - Iron mildly low 35 with , ferritin 13.  Plan  - GI following, appreciate recommendations.  - Following CBC q6h  - Hgb goal above 7.0.  - Patient has been consented for blood products if needed  - Protonix changed to 40mg daily PO  - Clear liquid diet now. If repeat Hemoglobin is stable this afternoon, okay to advance diet.   - If repeat bleeding, consider CTA or IR consultation for suspected diverticular bleeding.  - No ibuprofen, naproxen, or other NSAIDs  - Peripheral blood smear, folate pending.      #Asthma  - Home medications include albuterol inhaler prn, Trelegy inhaler daily, Duonebs prn  - Oxygen saturations stable on room air.  Plan  - Scheduled fluticasone-villanterol-umeclidinium (Trelegy substitute)  - Prn Albuterol inhaler and duonebs available    #Sinus surgery 1/15/2025  - Taking prednisone taper, flonase, fexofenadine, and  budesonide neb saline irrigation at home  Plan  - Continue prednisone taper (10mg daily 1/25, 1/26, 1/27)  - Continue flonase and  "fexofenadine  - Holding budesonide neb saline rinse - not in house    # Generalized anxiety disorder/depression  Resume home citalopram    # Hyperlipidemia  Resume home rosuvastatin             Diet: NPO per Anesthesia Guidelines for Procedure/Surgery Except for: Meds    DVT Prophylaxis: Pneumatic Compression Devices and holding chemoprophylaxis with bleeding  Garrett Catheter: Not present  Lines: None     Cardiac Monitoring: ACTIVE order. Indication: Procedural area  Code Status: Full Code      Clinically Significant Risk Factors Present on Admission          # Hyperchloremia: Highest Cl = 109 mmol/L in last 2 days, will monitor as appropriate      # Hypocalcemia: Lowest Ca = 8.5 mg/dL in last 2 days, will monitor and replace as appropriate              # Anemia: based on hgb <11  # Anemia: based on hgb <11       # Overweight: Estimated body mass index is 25.81 kg/m  as calculated from the following:    Height as of this encounter: 1.702 m (5' 7\").    Weight as of this encounter: 74.8 kg (164 lb 12.8 oz).       # Asthma: noted on problem list        Social Drivers of Health     Received from [email or web address], [email or web address]    Social Connections          Disposition Plan     Medically Ready for Discharge: Anticipated Tomorrow             John Gann MD  Hospitalist Service  [facility]  Securely message with [employer] (more info)  Text page via [employer] Paging/Directory   ______________________________________________________________________    Interval History   - Patient feeling well following procedure, no shortness of breath    Physical Exam   Vital Signs: Temp: 99.4  F (37.4  C) Temp src: Oral BP: 111/66 Pulse: 78   Resp: 18 SpO2: 96 % O2 Device: None (Room air)    Weight: 164 lbs 12.8 oz    General: Alert and Oriented x3. Answers questions appropriately. Follows commands.  Eyes:EOMI. PERRL. Normal Conjunctivae.  HENT: " Normocephalic. Atraumatic.  Resp: Breath sounds equal throughout. No crackles. No wheezing.  Cardio: Regular rate and rhythm. No murmurs. No friction rub.  Abd: Soft. Non-distended. Non-tender. Bowel sounds normal. No rebound tenderness.  MSK: No areas of ecchymosis or erythema.  Neuro: CN 2-12 grossly intact. Strength 5/5 in all 4 extremities.  Skin:No rashes. No jaundice.       Medical Decision Making       45 MINUTES SPENT BY ME on the date of service doing chart review, history, exam, documentation & further activities per the note.  MANAGEMENT DISCUSSED with the following over the past 24 hours: RN, CM   Tests ORDERED & REVIEWED in the past 24 hours:  - BMP  - CBC  - Magnesium  - Phosphorus  Medical complexity over the past 24 hours:  - Prescription DRUG MANAGEMENT performed      Data     I have personally reviewed the following data over the past 24 hrs:    6.5  \   6.4 (LL); 6.4 (LL)   / 186     141 109 (H) 14.4 /  94   3.9 26 0.75 \     Ferritin:  13 % Retic:  3.1 (H) LDH:  136       Imaging results reviewed over the past 24 hrs:   No results found for this or any previous visit (from the past 24 hours).

## 2025-01-24 NOTE — PLAN OF CARE
Goal Outcome Evaluation:Patient was admitted to the unit through ED . Made comfortable in bed with due medications administered. Patient on prep for EGD Colonoscopy tomorrow. Reassurance given to allay anxiety.

## 2025-01-25 VITALS
WEIGHT: 164.8 LBS | DIASTOLIC BLOOD PRESSURE: 67 MMHG | HEIGHT: 67 IN | OXYGEN SATURATION: 98 % | BODY MASS INDEX: 25.87 KG/M2 | HEART RATE: 66 BPM | SYSTOLIC BLOOD PRESSURE: 108 MMHG | RESPIRATION RATE: 20 BRPM | TEMPERATURE: 98.8 F

## 2025-01-25 LAB
ANION GAP SERPL CALCULATED.3IONS-SCNC: 4 MMOL/L (ref 7–15)
BUN SERPL-MCNC: 8.8 MG/DL (ref 6–20)
CALCIUM SERPL-MCNC: 8.8 MG/DL (ref 8.8–10.4)
CHLORIDE SERPL-SCNC: 108 MMOL/L (ref 98–107)
CREAT SERPL-MCNC: 0.64 MG/DL (ref 0.51–0.95)
EGFRCR SERPLBLD CKD-EPI 2021: >90 ML/MIN/1.73M2
ERYTHROCYTE [DISTWIDTH] IN BLOOD BY AUTOMATED COUNT: 14.8 % (ref 10–15)
FOLATE SERPL-MCNC: 19.1 NG/ML (ref 4.6–34.8)
GLUCOSE SERPL-MCNC: 87 MG/DL (ref 70–99)
HCO3 SERPL-SCNC: 29 MMOL/L (ref 22–29)
HCT VFR BLD AUTO: 24.3 % (ref 35–47)
HGB BLD-MCNC: 7.6 G/DL (ref 11.7–15.7)
HGB BLD-MCNC: 7.7 G/DL (ref 11.7–15.7)
MAGNESIUM SERPL-MCNC: 2.4 MG/DL (ref 1.7–2.3)
MCH RBC QN AUTO: 28.3 PG (ref 26.5–33)
MCHC RBC AUTO-ENTMCNC: 31.7 G/DL (ref 31.5–36.5)
MCV RBC AUTO: 89 FL (ref 78–100)
PHOSPHATE SERPL-MCNC: 3.7 MG/DL (ref 2.5–4.5)
PLATELET # BLD AUTO: 215 10E3/UL (ref 150–450)
POTASSIUM SERPL-SCNC: 3.7 MMOL/L (ref 3.4–5.3)
RBC # BLD AUTO: 2.72 10E6/UL (ref 3.8–5.2)
SODIUM SERPL-SCNC: 141 MMOL/L (ref 135–145)
WBC # BLD AUTO: 6.5 10E3/UL (ref 4–11)

## 2025-01-25 PROCEDURE — 250N000013 HC RX MED GY IP 250 OP 250 PS 637: Performed by: STUDENT IN AN ORGANIZED HEALTH CARE EDUCATION/TRAINING PROGRAM

## 2025-01-25 PROCEDURE — 85018 HEMOGLOBIN: CPT | Performed by: STUDENT IN AN ORGANIZED HEALTH CARE EDUCATION/TRAINING PROGRAM

## 2025-01-25 PROCEDURE — 85018 HEMOGLOBIN: CPT | Performed by: INTERNAL MEDICINE

## 2025-01-25 PROCEDURE — 82374 ASSAY BLOOD CARBON DIOXIDE: CPT | Performed by: STUDENT IN AN ORGANIZED HEALTH CARE EDUCATION/TRAINING PROGRAM

## 2025-01-25 PROCEDURE — 83735 ASSAY OF MAGNESIUM: CPT | Performed by: STUDENT IN AN ORGANIZED HEALTH CARE EDUCATION/TRAINING PROGRAM

## 2025-01-25 PROCEDURE — 250N000012 HC RX MED GY IP 250 OP 636 PS 637

## 2025-01-25 PROCEDURE — 36415 COLL VENOUS BLD VENIPUNCTURE: CPT | Performed by: INTERNAL MEDICINE

## 2025-01-25 PROCEDURE — 99239 HOSP IP/OBS DSCHRG MGMT >30: CPT | Performed by: STUDENT IN AN ORGANIZED HEALTH CARE EDUCATION/TRAINING PROGRAM

## 2025-01-25 PROCEDURE — 82746 ASSAY OF FOLIC ACID SERUM: CPT | Performed by: STUDENT IN AN ORGANIZED HEALTH CARE EDUCATION/TRAINING PROGRAM

## 2025-01-25 PROCEDURE — 84100 ASSAY OF PHOSPHORUS: CPT | Performed by: STUDENT IN AN ORGANIZED HEALTH CARE EDUCATION/TRAINING PROGRAM

## 2025-01-25 PROCEDURE — 82310 ASSAY OF CALCIUM: CPT | Performed by: STUDENT IN AN ORGANIZED HEALTH CARE EDUCATION/TRAINING PROGRAM

## 2025-01-25 PROCEDURE — 36415 COLL VENOUS BLD VENIPUNCTURE: CPT | Performed by: STUDENT IN AN ORGANIZED HEALTH CARE EDUCATION/TRAINING PROGRAM

## 2025-01-25 PROCEDURE — 80048 BASIC METABOLIC PNL TOTAL CA: CPT | Performed by: STUDENT IN AN ORGANIZED HEALTH CARE EDUCATION/TRAINING PROGRAM

## 2025-01-25 RX ORDER — PANTOPRAZOLE SODIUM 40 MG/1
40 TABLET, DELAYED RELEASE ORAL
Qty: 30 TABLET | Refills: 0 | Status: SHIPPED | OUTPATIENT
Start: 2025-01-26 | End: 2025-01-25

## 2025-01-25 RX ORDER — PANTOPRAZOLE SODIUM 40 MG/1
40 TABLET, DELAYED RELEASE ORAL
Qty: 30 TABLET | Refills: 0 | Status: SHIPPED | OUTPATIENT
Start: 2025-01-26

## 2025-01-25 RX ORDER — POLYETHYLENE GLYCOL 3350 17 G/17G
1 POWDER, FOR SOLUTION ORAL DAILY
Qty: 578 G | Refills: 0 | Status: SHIPPED | OUTPATIENT
Start: 2025-01-25

## 2025-01-25 RX ORDER — POLYETHYLENE GLYCOL 3350 17 G/17G
1 POWDER, FOR SOLUTION ORAL DAILY
Qty: 578 G | Refills: 0 | Status: SHIPPED | OUTPATIENT
Start: 2025-01-25 | End: 2025-01-25

## 2025-01-25 RX ORDER — FERROUS SULFATE 325(65) MG
325 TABLET ORAL EVERY OTHER DAY
Qty: 30 TABLET | Refills: 0 | Status: SHIPPED | OUTPATIENT
Start: 2025-01-25 | End: 2025-01-25

## 2025-01-25 RX ORDER — FERROUS SULFATE 325(65) MG
325 TABLET ORAL EVERY OTHER DAY
Status: DISCONTINUED | OUTPATIENT
Start: 2025-01-25 | End: 2025-01-25 | Stop reason: HOSPADM

## 2025-01-25 RX ORDER — FERROUS SULFATE 325(65) MG
325 TABLET ORAL EVERY OTHER DAY
Qty: 30 TABLET | Refills: 0 | Status: SHIPPED | OUTPATIENT
Start: 2025-01-25

## 2025-01-25 RX ADMIN — PANTOPRAZOLE SODIUM 40 MG: 40 TABLET, DELAYED RELEASE ORAL at 07:04

## 2025-01-25 RX ADMIN — UMECLIDINIUM 1 PUFF: 62.5 AEROSOL, POWDER ORAL at 09:22

## 2025-01-25 RX ADMIN — ROSUVASTATIN 10 MG: 10 TABLET, FILM COATED ORAL at 09:21

## 2025-01-25 RX ADMIN — FLUTICASONE FUROATE AND VILANTEROL TRIFENATATE 1 PUFF: 100; 25 POWDER RESPIRATORY (INHALATION) at 09:22

## 2025-01-25 RX ADMIN — PREDNISONE 10 MG: 5 TABLET ORAL at 09:22

## 2025-01-25 RX ADMIN — FEXOFENADINE HYDROCHLORIDE 180 MG: 60 TABLET ORAL at 09:21

## 2025-01-25 RX ADMIN — CITALOPRAM 40 MG: 40 TABLET ORAL at 09:21

## 2025-01-25 ASSESSMENT — ACTIVITIES OF DAILY LIVING (ADL)
ADLS_ACUITY_SCORE: 20
ADLS_ACUITY_SCORE: 33
ADLS_ACUITY_SCORE: 20
ADLS_ACUITY_SCORE: 33
ADLS_ACUITY_SCORE: 20
ADLS_ACUITY_SCORE: 20
ADLS_ACUITY_SCORE: 33

## 2025-01-25 NOTE — PLAN OF CARE
Problem: Adult Inpatient Plan of Care  Goal: Plan of Care Review  Description: The Plan of Care Review/Shift note should be completed every shift.  The Outcome Evaluation is a brief statement about your assessment that the patient is improving, declining, or no change.  This information will be displayed automatically on your shift  note.  Outcome: Adequate for Care Transition   Goal Outcome Evaluation:       Pt discharged to home via sister. Pt verbalizes understanding discharge instructions, changes to medications and follow up appointments. Pt has all of her personal items.

## 2025-01-25 NOTE — PROGRESS NOTES
"  GASTROENTEROLOGY PROGRESS NOTE     SUBJECTIVE   No major events overnight.  No further signs of bleeding.  Patient did have a hemoglobin of 6.4 after the colonoscopy which responded appropriately to hemoglobin of the upper sevens after 1 unit of PRBCs.  She denies any nausea, vomiting or abdominal pain today.     OBJECTIVE     Vitals Blood pressure 108/67, pulse 66, temperature 98.8  F (37.1  C), temperature source Oral, resp. rate 20, height 1.702 m (5' 7\"), weight 74.8 kg (164 lb 12.8 oz), SpO2 98%.          Physical Exam  General: awake, alert, oriented times three   Abdomen: soft, non-tender, non-distended, bowel sounds present        LABORATORY    ELECTROLYTE PANEL   Recent Labs   Lab 01/25/25  0643 01/24/25  0557 01/23/25  1912 01/23/25  1224    141  --  138   POTASSIUM 3.7 3.9  --  3.9   CHLORIDE 108* 109*  --  104   CO2 29 26  --  23   GLC 87 94 114* 129*   CR 0.64 0.75  --  0.64   BUN 8.8 14.4  --  21.7*      HEMATOLOGY PANEL   Recent Labs   Lab 01/25/25  0643 01/25/25  0029 01/24/25  1759 01/24/25  1059 01/24/25  0557 01/23/25  1812 01/23/25  1224   HGB 7.7* 7.6* 8.2* 6.4*  6.4* 7.2*   < > 8.7*   MCV 89  --   --  91 91  --  90   WBC 6.5  --   --  6.5 7.2  --  14.1*     --   --  186 222  --  279   INR  --   --   --   --   --   --  1.02    < > = values in this interval not displayed.      LIVER AND PANCREAS PANEL   Recent Labs   Lab 01/23/25  1224   AST 10   ALT 12   ALKPHOS 89   BILITOTAL <0.2     IMAGING STUDIES        I have reviewed the current diagnostic and laboratory tests.              IMPRESSION   Lesley Napoles is a pleasant 54 year old female who presented with painless hematochezia.  She underwent EGD and colonoscopy on 1/24/2025.  Her EGD was normal and her colonoscopy was only notable for multiple small adenomas (not removed to avoid confusion about her bleeding) and mild left-sided diverticular disease.  There was also some mild blood noted in her distal colon that suggest her " bleeding was due to diverticular hemorrhage.  I did explain to the patient that the bleeding seems to be out of proportion to her diverticular disease but that is the most likely source of bleeding at this time.  She has had no further signs of bleeding and therefore I think it should be safe to discharge her home.  She will need an outpatient colonoscopy for routine screening purposes to remove the polyps identified yesterday.  I would recommend a repeat hemoglobin with her primary care doctor later next week.     RECOMMENDATION   Okay to advance diet discharge patient home from GI perspective  Repeat hemoglobin with primary care doctor later next week  Nonurgent outpatient colonoscopy to remove polyps identified on colonoscopy.  I will help arrange this.  Patient should avoid nonsteroidal medications which have been shown to provoke diverticular bleeding     We will sign off at this time please call then additional questions.          Kike Do MD  Thank you for the opportunity to participate in the care of this patient.   Please feel free to call me with any questions or concerns.  Phone number [phone].

## 2025-01-25 NOTE — PLAN OF CARE
Problem: Pain Acute  Goal: Optimal Pain Control and Function  Outcome: Progressing  Intervention: Prevent or Manage Pain  Recent Flowsheet Documentation  Taken 1/25/2025 0030 by Karen Garcia RN  Medication Review/Management: medications reviewed   Goal Outcome Evaluation:  Assumed care of pt at 2300, Pt is A0x4, VSS, denies abdominal pain, denies nausea. Pt had an uneventful night, slept well. No bleeding noted throughout shift. Hemoglobin check at 0029 was 7.6, awaiting AM results       .

## 2025-01-25 NOTE — PLAN OF CARE
Goal Outcome Evaluation:       Resting this evening post EGD/Colonoscopy and 1 unit of PRBC on day shift.   - Hgb checks every 6 hours. Last at 17:59 was 8.2.   - Denies pain/discomfort.   - Urinating without issue.  - Tolerated full liquid diet.   - Independent in room. Calls appropriately.    Eliane Rdz RN-BC                 Problem: Pain Acute  Goal: Optimal Pain Control and Function  Outcome: Progressing     Problem: Anemia  Goal: Anemia Symptom Improvement  Outcome: Progressing     Problem: Surgery Nonspecified  Goal: Effective Bowel Elimination  Outcome: Progressing     Problem: Surgery Nonspecified  Goal: Absence of Infection Signs and Symptoms  Outcome: Progressing

## 2025-01-25 NOTE — DISCHARGE SUMMARY
"[facility]  Hospitalist Discharge Summary      Date of Admission:  1/23/2025  Date of Discharge:  No discharge date for patient encounter.  Discharging Provider: Joseph Stone DO  Discharge Service: Hospitalist Service    Discharge Diagnoses   Active Problems:    Rectal bleeding    Anemia due to blood loss, acute        Clinically Significant Risk Factors     # Overweight: Estimated body mass index is 25.81 kg/m  as calculated from the following:    Height as of this encounter: 1.702 m (5' 7\").    Weight as of this encounter: 74.8 kg (164 lb 12.8 oz).       Follow-ups Needed After Discharge   Follow-up Appointments       Follow Up      Follow up with GI        Hospital Follow-up with Existing Primary Care Provider (PCP)      Please see details below         Schedule Primary Care visit within: 7 Days               Discharge Disposition   Discharged to home  Condition at discharge: Stable    Hospital Course   Lesley Napoles is a 54 year old female with a pertinent history of asthma and constipation who presents to the ED by car for evaluation of 3 days of rectal bleeding. She was seen by her primary care provider 1/23/25 who noted about a 4 point drop in her hemoglobin. Hgb at [facility] ED was 8.7.     #Hematochezia  #Anemia, acute from blood loss  #iron deficiency anemia   - Reports 3 days of rectal bleeding and hematochezia.  - Hgb in the ED 8.7 initially.  - CTA A/P in ED negative for active bleeding or inflammatory changes.   - Underwent EGD/Colonoscopy with GI on 1/24 showing diverticulosis (likely source of bleeding) and non-bleeding internal hemorrhoids.  - EGD showed normal esophagus, stomach, and duodenum.   - Required 1 unit RBCs 1/24PM for hgb 6.4.  - LDH, Haptoglobin, Bilirubin not indicative of hemolysis. B12 normal.  - Iron mildly low 35 with , ferritin 13.  Plan  - GI following, appreciate recommendations.  - Following CBC q6h  - Hgb goal above 7.0.  - Patient has been " consented for blood products if needed  - Protonix changed to 40mg daily PO, although not specifically recommended by GI will prescribe on discharge as patient also on steroids   - will start PO iron supplementation   - recommend daily miralax   - No ibuprofen, naproxen, or other NSAIDs        #Asthma  - Home medications include albuterol inhaler prn, Trelegy inhaler daily, Duonebs prn  - Oxygen saturations stable on room air.  Plan  - Scheduled fluticasone-villanterol-umeclidinium (Trelegy substitute)  - Prn Albuterol inhaler and duonebs available     #Sinus surgery 1/15/2025  - Taking prednisone taper, flonase, fexofenadine, and  budesonide neb saline irrigation at home  Plan  - Continue prednisone taper (10mg daily 1/25, 1/26, 1/27)  - Continue flonase and fexofenadine  - Holding budesonide neb saline rinse - not in house     #Generalized anxiety disorder/depression  - Resume home citalopram     #Hyperlipidemia  - Resume home rosuvastatin       Consultations This Hospital Stay   GASTROENTEROLOGY IP CONSULT    Code Status   Full Code    Time Spent on this Encounter   IJoseph DO, personally saw the patient today and spent greater than 30 minutes discharging this patient.       Joseph Stone DO  [address] [address]  Phone: [phone]  Fax: [phone]  ______________________________________________________________________    Physical Exam   Vital Signs: Temp: 98.8  F (37.1  C) Temp src: Oral BP: 108/67 Pulse: 66   Resp: 20 SpO2: 98 % O2 Device: None (Room air)    Weight: 164 lbs 12.8 oz  General Appearance: Comfortable appearing and in no acute distress  Respiratory: CTAB  Cardiovascular: RRR  GI: no pain, normoactive bowel sounds, no masses   Skin: no lesions or rash   Other: Alert and oriented, no focal deficits         Primary Care Physician   Theodore Ardon    Discharge Orders      Reason for your hospital stay    Active  Problems:    Rectal bleeding    Anemia due to blood loss, acute     Activity    Your activity upon discharge: activity as tolerated     Follow Up    Follow up with GI     Diet    Follow this diet upon discharge: Current Diet:Orders Placed This Encounter      Regular Diet Adult     Hospital Follow-up with Existing Primary Care Provider (PCP)    Please see details below            Significant Results and Procedures   Results for orders placed or performed during the hospital encounter of 01/23/25   CTA Abdomen Pelvis with Contrast    Narrative    EXAM: CTA ABDOMEN PELVIS WITH CONTRAST  LOCATION: [facility]  DATE: 1/23/2025    INDICATION: 4+ episodes of bloody diarrhea daily x 4 days.  4 point hemoglobin drop.  Assess for GI bleed.  COMPARISON: No prior cross-sectional imaging of the abdomen or pelvis. Noncontrast CT of the chest 9/12/2024.  TECHNIQUE: CT angiogram abdomen pelvis during arterial phase of injection of IV contrast. 2D and 3D MIP reconstructions were performed by the CT technologist. Dose reduction techniques were used.  CONTRAST: 90 mL Isovue 370    FINDINGS:  ANGIOGRAM ABDOMEN/PELVIS: Normal caliber descending thoracic aorta. Normal caliber abdominal aorta and iliac arteries. The celiac axis, SMA, CED and their named proximal branches are widely patent. Renal arteries are also patent.    No arterial phase blush or intraluminal accumulation of contrast on portal venous phase acquisitions to suggest a point source of active gastrointestinal hemorrhage.    LOWER CHEST: There is a fat-containing right posterior Bochdalek hernia. Cylindrical bronchiectasis of subsegmental airways in the lower lobes.    HEPATOBILIARY: Normal.    PANCREAS: There are peripherally calcified stones filling the lumen of the gallbladder. No gallbladder distention or pericholecystic inflammation. The bile ducts are normal caliber. The liver is normal in size with smooth capsule. No liver lesions.    SPLEEN:  Normal.    ADRENAL GLANDS: Normal.    KIDNEYS/BLADDER: Normal.    BOWEL: Normal caliber large and small bowel. A few noninflamed diverticula arise from the transverse colon. No bowel wall thickening or inflammatory stranding in the mesentery. No peritoneal thickening or ascites.    LYMPH NODES: Normal.    PELVIC ORGANS: Status post hysterectomy. No pelvic mass or free fluid.    MUSCULOSKELETAL: There are tiny lumbar degenerative osteophytes. No aggressive or destructive bone lesions. No body wall hernia.      Impression    IMPRESSION:    1.  Normal caliber abdominal aorta and iliac arteries.  2.  No findings to suggest a point source of acute gastrointestinal hemorrhage.  3.  Cholelithiasis.  4.  Few noninflamed diverticula arising from the transverse colon.       Discharge Medications   Current Discharge Medication List        START taking these medications    Details   ferrous sulfate (FEROSUL) 325 (65 Fe) MG tablet Take 1 tablet (325 mg) by mouth every other day.  Qty: 30 tablet, Refills: 0    Associated Diagnoses: Iron deficiency anemia, unspecified iron deficiency anemia type      pantoprazole (PROTONIX) 40 MG EC tablet Take 1 tablet (40 mg) by mouth every morning (before breakfast).  Qty: 30 tablet, Refills: 0    Associated Diagnoses: Current chronic use of systemic steroids      polyethylene glycol (MIRALAX) 17 GM/Dose powder Take 17 g (1 Capful) by mouth daily.  Qty: 578 g, Refills: 0    Associated Diagnoses: Constipation by delayed colonic transit           CONTINUE these medications which have NOT CHANGED    Details   albuterol (PROAIR HFA/PROVENTIL HFA/VENTOLIN HFA) 108 (90 Base) MCG/ACT inhaler Inhale 2 puffs into the lungs every 6 hours as needed for shortness of breath, wheezing or cough.  Qty: 18 g, Refills: 11    Comments: Pharmacy may dispense brand covered by insurance (Proair, or proventil or ventolin or generic albuterol inhaler)  Associated Diagnoses: Severe persistent asthma without  complication (H)      budesonide (PULMICORT) 0.5 MG/2ML neb solution Spray 0.5 mg in nostril 2 times daily. MIX 1 VIAL INTO SALINE IRRIGATION AND RINSE TWICE DAILY.      citalopram (CELEXA) 40 MG tablet Take 40 mg by mouth daily      fexofenadine (ALLEGRA) 180 MG tablet Take 1 tablet (180 mg) by mouth daily.  Qty: 90 tablet, Refills: 5    Associated Diagnoses: Chronic rhinosinusitis; Environmental allergies      fluticasone (FLONASE) 50 MCG/ACT nasal spray Spray 2 sprays into both nostrils daily as needed for rhinitis or allergies.  Qty: 18.2 mL, Refills: 4    Associated Diagnoses: Chronic rhinosinusitis; Environmental allergies      Fluticasone-Umeclidin-Vilanterol (TRELEGY ELLIPTA) 200-62.5-25 MCG/ACT oral inhaler Inhale 1 puff into the lungs daily.  Qty: 28 each, Refills: 3    Associated Diagnoses: Severe persistent asthma without complication (H)      ipratropium - albuterol 0.5 mg/2.5 mg/3 mL (DUONEB) 0.5-2.5 (3) MG/3ML neb solution Take 1 vial (3 mLs) by nebulization every 6 hours as needed for shortness of breath / dyspnea or wheezing  Qty: 420 mL, Refills: 1    Associated Diagnoses: Moderate persistent asthma without complication      Multiple Vitamin (MULTIVITAMIN ADULT PO) Take 1 tablet by mouth daily.      Omega-3 Fatty Acids (FISH OIL) 1200 MG capsule Take 1,200 mg by mouth daily.      predniSONE (DELTASONE) 10 MG tablet Take by mouth daily. Finishing taper - 20 mg Daily on 1/23-24, then 10 mg Daily 1/25-26-27, then done.     Take 4 Tablets (40 mg) by mouth once daily with a meal for 3 days, THEN 3 Tablets (30 mg) once daily with a meal for 3 days, THEN 2 Tablets (20 mg) once daily with a meal for 3 days, THEN 1 Tablet (10 mg) once daily with a meal for 3 days.      rosuvastatin (CRESTOR) 10 MG tablet Take 10 mg by mouth daily      EPINEPHrine (ANY BX GENERIC EQUIV) 0.3 MG/0.3ML injection 2-pack Inject into outer thigh for allergic reaction  Qty: 4 each, Refills: 0    Associated Diagnoses: Seasonal  allergic rhinitis due to pollen; Allergic rhinitis caused by mold; Allergic rhinitis due to dust mite      ORDER FOR ALLERGEN IMMUNOTHERAPY Vaccine A MOLD/ INDOORALLERGENS/ RAGWEED  MM Mold Mix Alternaria, Aspergilus, Cladosporium, Penicillium 1:10 w/v, 1.0 ml  DFM Df Mite D farinae 10,000 AU, 0.5 ml  DPM Dp Mite D pteronyssinus. 10,000 AU, 0.5 ml  DOG AP Dog hair & dander, mixed breeds 1:10 w/v, 0.5 ml  Vaccine B POLLENS/ CAT  G GRASS MIX [place] Blue, Orchard, Redtop, Marco A 100, 000 BAU 0.3 ml  ELM Elm, American Ulmus Americana 1:20 w/v, 0.5 ml  MAP Maple, Hard/Sugar Acer saccharum 1:20 w/v, 0.5 ml  LQ Lamb's Quarters Chenopodium album 1:20 w/v, 0.5 ml  CAT Cat Hair 10,000 BAU 2.0 ml  TRUNG Trung, White Fraxinus Americana 1:20 w/v, 0.5 ml  Dilutions: None (red) Frequency: weekly  1/10 (yellow) Frequency: weekly  1/100 (blue) Frequency: weekly  1/1000 (green) Frequency: weekly      Diluent: HSA qs to 5ml  Qty: 5 mL, Refills: 11    Associated Diagnoses: Seasonal allergic rhinitis due to pollen; Allergic rhinitis due to dust mite; Allergic rhinitis caused by mold; Allergic rhinitis due to animals           Allergies   Allergies   Allergen Reactions    Articaine Swelling    Sulfamethoxazole-Trimethoprim Diarrhea

## 2025-02-03 ENCOUNTER — ANCILLARY PROCEDURE (OUTPATIENT)
Dept: CT IMAGING | Facility: CLINIC | Age: 55
End: 2025-02-03
Attending: NURSE PRACTITIONER
Payer: COMMERCIAL

## 2025-02-03 DIAGNOSIS — J45.50 SEVERE PERSISTENT ASTHMA WITHOUT COMPLICATION (H): ICD-10-CM

## 2025-02-03 DIAGNOSIS — R91.8 OPACITY OF LUNG ON IMAGING STUDY: ICD-10-CM

## 2025-02-03 PROCEDURE — 71250 CT THORAX DX C-: CPT | Mod: TC | Performed by: INTERNAL MEDICINE

## 2025-03-04 ENCOUNTER — LAB REQUISITION (OUTPATIENT)
Dept: LAB | Facility: CLINIC | Age: 55
End: 2025-03-04
Payer: COMMERCIAL

## 2025-03-04 DIAGNOSIS — R05.9 COUGH, UNSPECIFIED: ICD-10-CM

## 2025-03-04 DIAGNOSIS — J45.40 MODERATE PERSISTENT ASTHMA, UNCOMPLICATED: ICD-10-CM

## 2025-03-04 DIAGNOSIS — J32.8 OTHER CHRONIC SINUSITIS: ICD-10-CM

## 2025-03-04 LAB
BASOPHILS # BLD AUTO: 0.1 10E3/UL (ref 0–0.2)
BASOPHILS NFR BLD AUTO: 3 %
CORTIS SERPL-MCNC: 8.1 UG/DL
EOSINOPHIL # BLD AUTO: 0.5 10E3/UL (ref 0–0.7)
EOSINOPHIL NFR BLD AUTO: 11 %
ERYTHROCYTE [DISTWIDTH] IN BLOOD BY AUTOMATED COUNT: 13.3 % (ref 10–15)
HCT VFR BLD AUTO: 39.1 % (ref 35–47)
HGB BLD-MCNC: 11.6 G/DL (ref 11.7–15.7)
IMM GRANULOCYTES # BLD: 0 10E3/UL
IMM GRANULOCYTES NFR BLD: 0 %
LYMPHOCYTES # BLD AUTO: 1.1 10E3/UL (ref 0.8–5.3)
LYMPHOCYTES NFR BLD AUTO: 28 %
MCH RBC QN AUTO: 27.5 PG (ref 26.5–33)
MCHC RBC AUTO-ENTMCNC: 29.7 G/DL (ref 31.5–36.5)
MCV RBC AUTO: 93 FL (ref 78–100)
MONOCYTES # BLD AUTO: 0.4 10E3/UL (ref 0–1.3)
MONOCYTES NFR BLD AUTO: 9 %
NEUTROPHILS # BLD AUTO: 2 10E3/UL (ref 1.6–8.3)
NEUTROPHILS NFR BLD AUTO: 50 %
NRBC # BLD AUTO: 0 10E3/UL
NRBC BLD AUTO-RTO: 0 /100
PLATELET # BLD AUTO: 267 10E3/UL (ref 150–450)
RBC # BLD AUTO: 4.22 10E6/UL (ref 3.8–5.2)
WBC # BLD AUTO: 4.1 10E3/UL (ref 4–11)

## 2025-03-04 PROCEDURE — 85025 COMPLETE CBC W/AUTO DIFF WBC: CPT | Mod: ORL | Performed by: STUDENT IN AN ORGANIZED HEALTH CARE EDUCATION/TRAINING PROGRAM

## 2025-03-04 PROCEDURE — 82533 TOTAL CORTISOL: CPT | Mod: ORL | Performed by: STUDENT IN AN ORGANIZED HEALTH CARE EDUCATION/TRAINING PROGRAM

## 2025-03-12 DIAGNOSIS — J45.50 SEVERE PERSISTENT ASTHMA WITHOUT COMPLICATION (H): ICD-10-CM

## 2025-03-24 ENCOUNTER — TELEPHONE (OUTPATIENT)
Dept: GASTROENTEROLOGY | Facility: CLINIC | Age: 55
End: 2025-03-24
Payer: COMMERCIAL

## 2025-03-24 NOTE — TELEPHONE ENCOUNTER
Staff message sent to [facility] anesthesia for asthma review.     Upon review severe persistent asthma is noted but so is moderate. Patient saw pulmonary on 2/7/25. Seeking review to see if PAC eval is needed prior to procedure as this has been completed or scheduled.     ---------------------------------------------------------------------------------------    Pre visit planning completed.      Procedure details:    Patient scheduled for Upper endoscopy (EGD) with BRAVO capsule placement on 4/8/25.     Arrival time: 1130. Procedure time 1300    Facility location: [facility]; [address] [place] [address], [address] Floor, [place] [address]. Check in location: Main entrance at registration desk.  *Disclaimer: Drivers are to check in with patient and stay on campus during procedure.     Sedation type: MAC    Pre op exam needed? (See above)    Indication for procedure: chronic cough      Chart review:     Electronic implanted devices? No    Recent diagnosis of diverticulitis within the last 6 weeks? No      Medication review:    Diabetic? No    Anticoagulants? No    Weight loss medication/injectable? No GLP-1 medication per patient's medication list. Nursing to verify with pre-assessment call.    Other medication HOLDING recommendations:  BRAVO: Does patient have a nickel allergy? No nickel allergy listed. Nursing to verify during pre-assessment call   PPIs/Acid reducing medication(s): HOLD 2 weeks before procedure.      Prep for procedure:     Bowel prep recommendation: N/A    Procedure information and instructions sent via [employer]         La Nena Hall RN  Endoscopy Procedure Pre Assessment   [phone] option 3

## 2025-03-25 NOTE — TELEPHONE ENCOUNTER
"Response received from Rian Zimmerman MD     \"Ok without pac if they are scheduled with MAC\"    ---------------------------------------------------------------------------------------  No PAC eval needed for procedure. It is scheduled with [facility].     La Nena Hall RN  Endoscopy Procedure Pre Assessment   [phone] option 3   "

## 2025-03-25 NOTE — TELEPHONE ENCOUNTER
Pre assessment completed for upcoming procedure.   (Please see previous telephone encounter notes for complete details)    Patient returned call.       Procedure details:    Arrival time and facility location reviewed.    Pre op exam needed? No. -- See note.     Designated  policy reviewed and that site requests drivers to check in and stay on campus. Instructed to have someone stay 24  hours post procedure.   *Disclaimer - please notify the [facility] Op Manager with any  issues/concerns.    Medication review:    HOLD 2 weeks before procedure any acid reducing medications (Aciphex, Axid, cimetidine, esomeprazole, famotidine, Losec, metoclopramide, Nexium, nizatidine, omeprazole, pantoprazole, Pepcid, Prilosec, Propulsid, Protonix, rabeprazole, ranitidine, Reglan, Tagamet and Zantac).     Pt denies taking any acid reducing medications including PPI's or PRN Tums. She is aware of holding guidelines.       Prep for procedure:    Procedure prep instructions reviewed.        Any additional information needed:  No nickel allergy per the Pt.       Patient verbalized understanding and had no questions or concerns at this time.      Karin Monteiro RN  Endoscopy Procedure Pre Assessment   [phone] option 3

## 2025-04-08 ENCOUNTER — ANESTHESIA (OUTPATIENT)
Dept: GASTROENTEROLOGY | Facility: CLINIC | Age: 55
End: 2025-04-08
Payer: COMMERCIAL

## 2025-04-08 ENCOUNTER — ANESTHESIA EVENT (OUTPATIENT)
Dept: GASTROENTEROLOGY | Facility: CLINIC | Age: 55
End: 2025-04-08
Payer: COMMERCIAL

## 2025-04-08 ENCOUNTER — HOSPITAL ENCOUNTER (OUTPATIENT)
Facility: CLINIC | Age: 55
Discharge: HOME | End: 2025-04-08
Attending: INTERNAL MEDICINE | Admitting: INTERNAL MEDICINE
Payer: COMMERCIAL

## 2025-04-08 VITALS
OXYGEN SATURATION: 100 % | HEART RATE: 59 BPM | DIASTOLIC BLOOD PRESSURE: 81 MMHG | SYSTOLIC BLOOD PRESSURE: 115 MMHG | TEMPERATURE: 98.3 F | RESPIRATION RATE: 16 BRPM

## 2025-04-08 LAB — UPPER GI ENDOSCOPY: NORMAL

## 2025-04-08 PROCEDURE — 43239 EGD BIOPSY SINGLE/MULTIPLE: CPT | Performed by: INTERNAL MEDICINE

## 2025-04-08 PROCEDURE — 370N000017 HC ANESTHESIA TECHNICAL FEE, PER MIN: Performed by: INTERNAL MEDICINE

## 2025-04-08 PROCEDURE — 91035 G-ESOPH REFLX TST W/ELECTROD: CPT | Performed by: INTERNAL MEDICINE

## 2025-04-08 PROCEDURE — 250N000009 HC RX 250: Performed by: NURSE ANESTHETIST, CERTIFIED REGISTERED

## 2025-04-08 PROCEDURE — 250N000011 HC RX IP 250 OP 636: Performed by: NURSE ANESTHETIST, CERTIFIED REGISTERED

## 2025-04-08 PROCEDURE — 250N000011 HC RX IP 250 OP 636: Performed by: INTERNAL MEDICINE

## 2025-04-08 PROCEDURE — 88305 TISSUE EXAM BY PATHOLOGIST: CPT | Mod: TC | Performed by: INTERNAL MEDICINE

## 2025-04-08 PROCEDURE — 258N000003 HC RX IP 258 OP 636: Performed by: NURSE ANESTHETIST, CERTIFIED REGISTERED

## 2025-04-08 RX ORDER — OXYCODONE HYDROCHLORIDE 10 MG/1
10 TABLET ORAL
Status: DISCONTINUED | OUTPATIENT
Start: 2025-04-08 | End: 2025-04-08 | Stop reason: HOSPADM

## 2025-04-08 RX ORDER — SODIUM CHLORIDE, SODIUM LACTATE, POTASSIUM CHLORIDE, CALCIUM CHLORIDE 600; 310; 30; 20 MG/100ML; MG/100ML; MG/100ML; MG/100ML
INJECTION, SOLUTION INTRAVENOUS CONTINUOUS
Status: DISCONTINUED | OUTPATIENT
Start: 2025-04-08 | End: 2025-04-08 | Stop reason: HOSPADM

## 2025-04-08 RX ORDER — FLUTICASONE PROPIONATE AND SALMETEROL 250; 50 UG/1; UG/1
1 POWDER RESPIRATORY (INHALATION) EVERY 12 HOURS
COMMUNITY

## 2025-04-08 RX ORDER — PROPOFOL 10 MG/ML
INJECTION, EMULSION INTRAVENOUS CONTINUOUS PRN
Status: DISCONTINUED | OUTPATIENT
Start: 2025-04-08 | End: 2025-04-08

## 2025-04-08 RX ORDER — NALOXONE HYDROCHLORIDE 0.4 MG/ML
0.1 INJECTION, SOLUTION INTRAMUSCULAR; INTRAVENOUS; SUBCUTANEOUS
Status: DISCONTINUED | OUTPATIENT
Start: 2025-04-08 | End: 2025-04-08 | Stop reason: HOSPADM

## 2025-04-08 RX ORDER — ONDANSETRON 2 MG/ML
4 INJECTION INTRAMUSCULAR; INTRAVENOUS
Status: COMPLETED | OUTPATIENT
Start: 2025-04-08 | End: 2025-04-08

## 2025-04-08 RX ORDER — OXYCODONE HYDROCHLORIDE 5 MG/1
5 TABLET ORAL
Status: DISCONTINUED | OUTPATIENT
Start: 2025-04-08 | End: 2025-04-08 | Stop reason: HOSPADM

## 2025-04-08 RX ORDER — LIDOCAINE HYDROCHLORIDE 20 MG/ML
INJECTION, SOLUTION INFILTRATION; PERINEURAL PRN
Status: DISCONTINUED | OUTPATIENT
Start: 2025-04-08 | End: 2025-04-08

## 2025-04-08 RX ORDER — NALOXONE HYDROCHLORIDE 0.4 MG/ML
0.4 INJECTION, SOLUTION INTRAMUSCULAR; INTRAVENOUS; SUBCUTANEOUS
Status: DISCONTINUED | OUTPATIENT
Start: 2025-04-08 | End: 2025-04-08 | Stop reason: HOSPADM

## 2025-04-08 RX ORDER — DEXAMETHASONE SODIUM PHOSPHATE 4 MG/ML
4 INJECTION, SOLUTION INTRA-ARTICULAR; INTRALESIONAL; INTRAMUSCULAR; INTRAVENOUS; SOFT TISSUE
Status: DISCONTINUED | OUTPATIENT
Start: 2025-04-08 | End: 2025-04-08 | Stop reason: HOSPADM

## 2025-04-08 RX ORDER — FLUMAZENIL 0.1 MG/ML
0.2 INJECTION, SOLUTION INTRAVENOUS
Status: DISCONTINUED | OUTPATIENT
Start: 2025-04-08 | End: 2025-04-08 | Stop reason: HOSPADM

## 2025-04-08 RX ORDER — ONDANSETRON 4 MG/1
4 TABLET, ORALLY DISINTEGRATING ORAL EVERY 30 MIN PRN
Status: DISCONTINUED | OUTPATIENT
Start: 2025-04-08 | End: 2025-04-08 | Stop reason: HOSPADM

## 2025-04-08 RX ORDER — NALOXONE HYDROCHLORIDE 0.4 MG/ML
0.2 INJECTION, SOLUTION INTRAMUSCULAR; INTRAVENOUS; SUBCUTANEOUS
Status: DISCONTINUED | OUTPATIENT
Start: 2025-04-08 | End: 2025-04-08 | Stop reason: HOSPADM

## 2025-04-08 RX ORDER — PROPOFOL 10 MG/ML
INJECTION, EMULSION INTRAVENOUS PRN
Status: DISCONTINUED | OUTPATIENT
Start: 2025-04-08 | End: 2025-04-08

## 2025-04-08 RX ORDER — ONDANSETRON 4 MG/1
4 TABLET, ORALLY DISINTEGRATING ORAL EVERY 6 HOURS PRN
Status: DISCONTINUED | OUTPATIENT
Start: 2025-04-08 | End: 2025-04-08 | Stop reason: HOSPADM

## 2025-04-08 RX ORDER — SODIUM CHLORIDE, SODIUM LACTATE, POTASSIUM CHLORIDE, CALCIUM CHLORIDE 600; 310; 30; 20 MG/100ML; MG/100ML; MG/100ML; MG/100ML
INJECTION, SOLUTION INTRAVENOUS CONTINUOUS PRN
Status: DISCONTINUED | OUTPATIENT
Start: 2025-04-08 | End: 2025-04-08

## 2025-04-08 RX ORDER — ONDANSETRON 2 MG/ML
4 INJECTION INTRAMUSCULAR; INTRAVENOUS EVERY 30 MIN PRN
Status: DISCONTINUED | OUTPATIENT
Start: 2025-04-08 | End: 2025-04-08 | Stop reason: HOSPADM

## 2025-04-08 RX ORDER — PROCHLORPERAZINE MALEATE 5 MG/1
10 TABLET ORAL EVERY 6 HOURS PRN
Status: DISCONTINUED | OUTPATIENT
Start: 2025-04-08 | End: 2025-04-08 | Stop reason: HOSPADM

## 2025-04-08 RX ORDER — ONDANSETRON 2 MG/ML
4 INJECTION INTRAMUSCULAR; INTRAVENOUS EVERY 6 HOURS PRN
Status: DISCONTINUED | OUTPATIENT
Start: 2025-04-08 | End: 2025-04-08 | Stop reason: HOSPADM

## 2025-04-08 RX ADMIN — PROPOFOL 50 MG: 10 INJECTION, EMULSION INTRAVENOUS at 14:16

## 2025-04-08 RX ADMIN — ONDANSETRON 4 MG: 2 INJECTION INTRAMUSCULAR; INTRAVENOUS at 13:34

## 2025-04-08 RX ADMIN — PROPOFOL 150 MCG/KG/MIN: 10 INJECTION, EMULSION INTRAVENOUS at 14:12

## 2025-04-08 RX ADMIN — LIDOCAINE HYDROCHLORIDE 100 MG: 20 INJECTION, SOLUTION INFILTRATION; PERINEURAL at 14:12

## 2025-04-08 RX ADMIN — MIDAZOLAM 2 MG: 1 INJECTION INTRAMUSCULAR; INTRAVENOUS at 14:31

## 2025-04-08 RX ADMIN — SODIUM CHLORIDE, SODIUM LACTATE, POTASSIUM CHLORIDE, AND CALCIUM CHLORIDE: .6; .31; .03; .02 INJECTION, SOLUTION INTRAVENOUS at 14:12

## 2025-04-08 ASSESSMENT — ACTIVITIES OF DAILY LIVING (ADL)
ADLS_ACUITY_SCORE: 54

## 2025-04-08 NOTE — ANESTHESIA CARE TRANSFER NOTE
Patient: Lesley Napoles    Procedure: Procedure(s):  EGD, gastroesophageal reflux test with mucosal PH electrode  ESOPHAGOGASTRODUODENOSCOPY, WITH BIOPSY       Diagnosis: Chronic cough [R05.3]  Diagnosis Additional Information: No value filed.    Anesthesia Type:   MAC     Note:    Oropharynx: oropharynx clear of all foreign objects  Level of Consciousness: awake  Oxygen Supplementation: face mask  Level of Supplemental Oxygen (L/min / FiO2): 5  Independent Airway: airway patency satisfactory and stable  Dentition: dentition unchanged S/P dental procedure  Vital Signs Stable: post-procedure vital signs reviewed and stable    Patient transferred to: PACU    Handoff Report: Identifed the Patient, Identified the Reponsible Provider, Reviewed the pertinent medical history, Discussed the surgical course, Reviewed Intra-OP anesthesia mangement and issues during anesthesia, Set expectations for post-procedure period and Allowed opportunity for questions and acknowledgement of understanding      Vitals:  Vitals Value Taken Time   /83 04/08/25 1448   Temp     Pulse 78    Resp 12    SpO2 96 % 04/08/25 1449   Vitals shown include unfiled device data.    Electronically Signed By: RAYMUNDO Granger CRNA  April 8, 2025  2:49 PM

## 2025-04-08 NOTE — OR NURSING
Pt underwent EGD with biopsies and BRAVO placement under MAC. Specimens: sent to lab. Pt transferred to recovery and report given to madai LOPEZ.       Mary Garner RN

## 2025-04-08 NOTE — H&P
Lesley Napoles  [id number]  female  54 year old      Reason for procedure/surgery: egd, wireless pH, biopsy esophagus distal and proximal    Patient Active Problem List   Diagnosis    Allergic rhinitis due to pollen    Constipation by delayed colonic transit    Excessive and frequent menstruation    Generalized anxiety disorder    Moderate persistent asthma without complication    Recurrent major depression in full remission    Allergic rhinitis caused by mold    Allergic rhinitis due to dust mite    Allergic rhinitis due to animals    Rectal bleeding    Anemia due to blood loss, acute       Past Surgical History:    Past Surgical History:   Procedure Laterality Date    COLONOSCOPY N/A 1/24/2025    Procedure: COLONOSCOPY;  Surgeon: Kike Do MD;  Location: [facility] - [place], [place]    ESOPHAGOSCOPY, GASTROSCOPY, DUODENOSCOPY (EGD), COMBINED N/A 1/24/2025    Procedure: ESOPHAGOGASTRODUODENOSCOPY;  Surgeon: Kike Do MD;  Location: [facility] OR       Past Medical History: History reviewed. No pertinent past medical history.    Social History:   Social History     Tobacco Use    Smoking status: Never    Smokeless tobacco: Never   Substance Use Topics    Alcohol use: Not on file       Family History: History reviewed. No pertinent family history.    Allergies:   Allergies   Allergen Reactions    Articaine Swelling    Misc. Sulfonamide Containing Compounds     Sulfamethoxazole-Trimethoprim Diarrhea       Active Medications:   No current outpatient medications on file.       Systemic Review:   CONSTITUTIONAL: NEGATIVE for fever, chills, change in weight  ENT/MOUTH: NEGATIVE for ear, mouth and throat problems  RESP: NEGATIVE for significant cough or SOB  CV: NEGATIVE for chest pain, palpitations or peripheral edema    Physical Examination:   Vital Signs: /81 (BP Location: Left arm)   Pulse 59   Temp 98.3  F (36.8  C) (Oral)   Resp 20   GENERAL: healthy, alert and no distress  NECK: no adenopathy, no  asymmetry, masses, or scars  RESP: lungs clear to auscultation - no rales, rhonchi or wheezes  CV: regular rate and rhythm, normal S1 S2, no S3 or S4, no murmur, click or rub, no peripheral edema and peripheral pulses strong  ABDOMEN: soft, nontender, no hepatosplenomegaly, no masses and bowel sounds normal  MS: no gross musculoskeletal defects noted, no edema    I examined patient s dentition and informed the patient that dental injuries are a risk of any anesthetic management. No concerns were noted with this patient's dentition. . The patient has consented to proceed with the procedure.    Plan: Appropriate to proceed as scheduled.      Cornelio Lovell DO  4/8/2025    PCP:  Theodore Ardon

## 2025-04-08 NOTE — ANESTHESIA POSTPROCEDURE EVALUATION
Patient: Lesley Napoles    Procedure: Procedure(s):  EGD, gastroesophageal reflux test with mucosal PH electrode  ESOPHAGOGASTRODUODENOSCOPY, WITH BIOPSY       Anesthesia Type:  MAC    Note:  Disposition: Outpatient   Postop Pain Control: Uneventful            Sign Out: Well controlled pain   PONV: No   Neuro/Psych: Uneventful            Sign Out: Acceptable/Baseline neuro status   Airway/Respiratory: Uneventful            Sign Out: Acceptable/Baseline resp. status   CV/Hemodynamics: Uneventful            Sign Out: Acceptable CV status; No obvious hypovolemia; No obvious fluid overload   Other NRE: NONE   DID A NON-ROUTINE EVENT OCCUR? No           Last vitals:  Vitals Value Taken Time   /81 04/08/25 1450   Temp     Pulse     Resp 16 04/08/25 1450   SpO2 96 % 04/08/25 1454   Vitals shown include unfiled device data.    Electronically Signed By: Mary Will MD  April 8, 2025  2:55 PM

## 2025-04-08 NOTE — ANESTHESIA PREPROCEDURE EVALUATION
Anesthesia Pre-Procedure Evaluation    Patient: Lesley Napoles   MRN: [id number] : 1970        Procedure : Procedure(s):  EGD, gastroesophageal reflux test with mucosal PH electrode          No past medical history on file.   Past Surgical History:   Procedure Laterality Date    COLONOSCOPY N/A 2025    Procedure: COLONOSCOPY;  Surgeon: Kike Do MD;  Location: [facility] OR    ESOPHAGOSCOPY, GASTROSCOPY, DUODENOSCOPY (EGD), COMBINED N/A 2025    Procedure: ESOPHAGOGASTRODUODENOSCOPY;  Surgeon: Kike Do MD;  Location: [facility] OR      Allergies   Allergen Reactions    Articaine Swelling    Misc. Sulfonamide Containing Compounds     Sulfamethoxazole-Trimethoprim Diarrhea      Social History     Tobacco Use    Smoking status: Never    Smokeless tobacco: Never   Substance Use Topics    Alcohol use: Not on file      Wt Readings from Last 1 Encounters:   25 76.4 kg (168 lb 6.4 oz)        Anesthesia Evaluation   Pt has had prior anesthetic.     No history of anesthetic complications       ROS/MED HX  ENT/Pulmonary: Comment: Chronic cough    (+)           allergic rhinitis,           asthma                  Neurologic:  - neg neurologic ROS     Cardiovascular:  - neg cardiovascular ROS     METS/Exercise Tolerance: >4 METS    Hematologic:     (+)      anemia,          Musculoskeletal:       GI/Hepatic:       Renal/Genitourinary:  - neg Renal ROS     Endo:  - neg endo ROS     Psychiatric/Substance Use:     (+) psychiatric history depression       Infectious Disease:  - neg infectious disease ROS     Malignancy:  - neg malignancy ROS     Other:            Physical Exam    Airway        Mallampati: II   TM distance: > 3 FB   Neck ROM: full   Mouth opening: > 3 cm    Respiratory Devices and Support         Dental       (+) Minor Abnormalities - some fillings, tiny chips      Cardiovascular   cardiovascular exam normal          Pulmonary   pulmonary exam normal                OUTSIDE  "LABS:  CBC:   Lab Results   Component Value Date    WBC 4.1 03/04/2025    WBC 6.5 01/25/2025    HGB 11.6 (L) 03/04/2025    HGB 7.7 (L) 01/25/2025    HCT 39.1 03/04/2025    HCT 24.3 (L) 01/25/2025     03/04/2025     01/25/2025     BMP:   Lab Results   Component Value Date     01/25/2025     01/24/2025    POTASSIUM 3.7 01/25/2025    POTASSIUM 3.9 01/24/2025    CHLORIDE 108 (H) 01/25/2025    CHLORIDE 109 (H) 01/24/2025    CO2 29 01/25/2025    CO2 26 01/24/2025    BUN 8.8 01/25/2025    BUN 14.4 01/24/2025    CR 0.64 01/25/2025    CR 0.75 01/24/2025    GLC 87 01/25/2025    GLC 94 01/24/2025     COAGS:   Lab Results   Component Value Date    PTT 21 (L) 01/23/2025    INR 1.02 01/23/2025     POC: No results found for: \"BGM\", \"HCG\", \"HCGS\"  HEPATIC:   Lab Results   Component Value Date    ALBUMIN 3.7 01/23/2025    PROTTOTAL 6.1 (L) 01/23/2025    ALT 12 01/23/2025    AST 10 01/23/2025    ALKPHOS 89 01/23/2025    BILITOTAL <0.2 01/23/2025     OTHER:   Lab Results   Component Value Date    AGUSTÍN 8.8 01/25/2025    PHOS 3.7 01/25/2025    MAG 2.4 (H) 01/25/2025    TSH 0.77 07/29/2019    CRP 0.5 04/25/2022    SED 10 01/23/2025       Anesthesia Plan    ASA Status:  2       Anesthesia Type: MAC.     - Reason for MAC: straight local not clinically adequate   Induction: Intravenous.   Maintenance: TIVA.        Consents    Anesthesia Plan(s) and associated risks, benefits, and realistic alternatives discussed. Questions answered and patient/representative(s) expressed understanding.     - Discussed: Risks, Benefits and Alternatives for BOTH SEDATION and the PROCEDURE were discussed     - Discussed with:  Patient      - Extended Intubation/Ventilatory Support Discussed: No.      - Patient is DNR/DNI Status: No     Use of blood products discussed: No .     Postoperative Care    Pain management: IV analgesics, Oral pain medications, Multi-modal analgesia.   PONV prophylaxis: Ondansetron (or other 5HT-3) "     Comments:               Mary Will MD    Clinically Significant Risk Factors Present on Admission                                 # Asthma: noted on problem list

## 2025-04-09 LAB
PATH REPORT.COMMENTS IMP SPEC: NORMAL
PATH REPORT.COMMENTS IMP SPEC: NORMAL
PATH REPORT.FINAL DX SPEC: NORMAL
PATH REPORT.GROSS SPEC: NORMAL
PATH REPORT.MICROSCOPIC SPEC OTHER STN: NORMAL
PATH REPORT.RELEVANT HX SPEC: NORMAL
PHOTO IMAGE: NORMAL

## 2025-04-14 ENCOUNTER — MYC MEDICAL ADVICE (OUTPATIENT)
Dept: GASTROENTEROLOGY | Facility: CLINIC | Age: 55
End: 2025-04-14
Payer: COMMERCIAL

## 2025-04-14 ENCOUNTER — DOCUMENTATION ONLY (OUTPATIENT)
Dept: GASTROENTEROLOGY | Facility: CLINIC | Age: 55
End: 2025-04-14
Payer: COMMERCIAL

## 2025-04-14 DIAGNOSIS — R05.3 CHRONIC COUGH: Primary | ICD-10-CM

## 2025-04-14 NOTE — PROGRESS NOTES
Bravo monitor and diary returned.  Data uploaded and sent to reading provider for interpretation.  Reading Provider:  Nas

## 2025-07-23 ENCOUNTER — LAB REQUISITION (OUTPATIENT)
Dept: LAB | Facility: CLINIC | Age: 55
End: 2025-07-23
Payer: COMMERCIAL

## 2025-07-23 DIAGNOSIS — J32.8 OTHER CHRONIC SINUSITIS: ICD-10-CM

## 2025-07-24 LAB — BACTERIA SPEC CULT: NORMAL

## 2025-07-31 LAB
BACTERIA SPEC CULT: ABNORMAL
BACTERIA SPEC CULT: ABNORMAL

## 2025-08-07 ENCOUNTER — OFFICE VISIT (OUTPATIENT)
Dept: PULMONOLOGY | Facility: CLINIC | Age: 55
End: 2025-08-07
Attending: NURSE PRACTITIONER
Payer: COMMERCIAL

## 2025-08-07 VITALS
DIASTOLIC BLOOD PRESSURE: 78 MMHG | SYSTOLIC BLOOD PRESSURE: 106 MMHG | HEART RATE: 63 BPM | BODY MASS INDEX: 27.35 KG/M2 | OXYGEN SATURATION: 100 % | WEIGHT: 174.6 LBS

## 2025-08-07 DIAGNOSIS — J32.9 CHRONIC RHINOSINUSITIS: ICD-10-CM

## 2025-08-07 DIAGNOSIS — R91.8 PULMONARY NODULES: ICD-10-CM

## 2025-08-07 DIAGNOSIS — J45.50 SEVERE PERSISTENT ASTHMA WITHOUT COMPLICATION (H): Primary | ICD-10-CM

## 2025-08-07 RX ORDER — DOXYCYCLINE 100 MG/1
100 CAPSULE ORAL 2 TIMES DAILY
COMMUNITY
Start: 2025-08-01

## 2025-08-07 RX ORDER — MUPIROCIN 2 %
OINTMENT (GRAM) TOPICAL DAILY
COMMUNITY
Start: 2025-08-01

## 2025-08-07 RX ORDER — MAGNESIUM HYDROXIDE 1200 MG/15ML
LIQUID ORAL DAILY
COMMUNITY
Start: 2025-08-04

## 2025-08-07 RX ORDER — AZELASTINE HYDROCHLORIDE 137 UG/1
SPRAY, METERED NASAL DAILY
COMMUNITY
Start: 2025-05-27

## 2025-08-07 ASSESSMENT — ASTHMA QUESTIONNAIRES
QUESTION_3 LAST FOUR WEEKS HOW OFTEN DID YOUR ASTHMA SYMPTOMS (WHEEZING, COUGHING, SHORTNESS OF BREATH, CHEST TIGHTNESS OR PAIN) WAKE YOU UP AT NIGHT OR EARLIER THAN USUAL IN THE MORNING: NOT AT ALL
ACT_TOTALSCORE: 25
QUESTION_2 LAST FOUR WEEKS HOW OFTEN HAVE YOU HAD SHORTNESS OF BREATH: NOT AT ALL
QUESTION_5 LAST FOUR WEEKS HOW WOULD YOU RATE YOUR ASTHMA CONTROL: COMPLETELY CONTROLLED
QUESTION_4 LAST FOUR WEEKS HOW OFTEN HAVE YOU USED YOUR RESCUE INHALER OR NEBULIZER MEDICATION (SUCH AS ALBUTEROL): NOT AT ALL
QUESTION_1 LAST FOUR WEEKS HOW MUCH OF THE TIME DID YOUR ASTHMA KEEP YOU FROM GETTING AS MUCH DONE AT WORK, SCHOOL OR AT HOME: NONE OF THE TIME

## (undated) DEVICE — SOL WATER IRRIG 1000ML BOTTLE 2F7114

## (undated) DEVICE — TUBING SUCTION MEDI-VAC 1/4"X20' N620A

## (undated) DEVICE — SUCTION MANIFOLD NEPTUNE 2 SYS 1 PORT 702-025-000

## (undated) RX ORDER — ONDANSETRON 2 MG/ML
INJECTION INTRAMUSCULAR; INTRAVENOUS
Status: DISPENSED
Start: 2025-04-08

## (undated) RX ORDER — PROPOFOL 10 MG/ML
INJECTION, EMULSION INTRAVENOUS
Status: DISPENSED
Start: 2025-01-24

## (undated) RX ORDER — LIDOCAINE HYDROCHLORIDE 10 MG/ML
INJECTION, SOLUTION EPIDURAL; INFILTRATION; INTRACAUDAL; PERINEURAL
Status: DISPENSED
Start: 2022-08-29

## (undated) RX ORDER — PROPOFOL 10 MG/ML
INJECTION, EMULSION INTRAVENOUS
Status: DISPENSED
Start: 2025-04-08

## (undated) RX ORDER — EPHEDRINE SULFATE 50 MG/ML
INJECTION, SOLUTION INTRAMUSCULAR; INTRAVENOUS; SUBCUTANEOUS
Status: DISPENSED
Start: 2025-01-24